# Patient Record
Sex: FEMALE | Race: WHITE | NOT HISPANIC OR LATINO | Employment: OTHER | URBAN - METROPOLITAN AREA
[De-identification: names, ages, dates, MRNs, and addresses within clinical notes are randomized per-mention and may not be internally consistent; named-entity substitution may affect disease eponyms.]

---

## 2017-02-21 ENCOUNTER — PATIENT MESSAGE (OUTPATIENT)
Dept: RESEARCH | Facility: HOSPITAL | Age: 77
End: 2017-02-21

## 2017-03-20 RX ORDER — FENOFIBRATE 134 MG/1
CAPSULE ORAL
Qty: 90 CAPSULE | Refills: 3 | Status: SHIPPED | OUTPATIENT
Start: 2017-03-20 | End: 2018-03-10 | Stop reason: SDUPTHER

## 2017-03-24 ENCOUNTER — TELEPHONE (OUTPATIENT)
Dept: OBSTETRICS AND GYNECOLOGY | Facility: CLINIC | Age: 77
End: 2017-03-24

## 2017-03-24 DIAGNOSIS — Z12.31 VISIT FOR SCREENING MAMMOGRAM: Primary | ICD-10-CM

## 2017-03-24 NOTE — TELEPHONE ENCOUNTER
Spoke with pt. Appt set [place] location as desired 4/6. Pt aware time/location will mail slip.     Pt had an upcoming annual appt with . Pt advised Medicare covers annual exams every 2 years. Asked if she was having any concerns. Pt states she has been having some pressure in the vaginal area and wanted to get that checked especially since she still has all of her female organs. Pt advISED TO KEEP APPT. I WILL UPDATE THE APPT AS A PROBLEM VISIT THAT WAY SHE DOES NOT RECEIVE A BILL. PT VOICED UNDERSTANDING. APPT UPDATED!

## 2017-03-24 NOTE — TELEPHONE ENCOUNTER
----- Message from Olga Parag sent at 3/24/2017 11:13 AM CDT -----  Contact: ROBERTO ASKEW [[id number]]  x_  1st Request  _  2nd Request  _  3rd Request        Who: ROBERTO ASKEW [[id number]]    Why: Pt states she would like to get her [facility] orders in    What Number to Call Back: [phone]    When to Expect a call back: (Before the end of the day)   -- if call after 3:00 call back will be tomorrow.

## 2017-04-06 ENCOUNTER — HOSPITAL ENCOUNTER (OUTPATIENT)
Dept: RADIOLOGY | Facility: HOSPITAL | Age: 77
Discharge: HOME | End: 2017-04-06
Attending: OBSTETRICS & GYNECOLOGY
Payer: MEDICARE

## 2017-04-06 DIAGNOSIS — Z12.31 VISIT FOR SCREENING MAMMOGRAM: ICD-10-CM

## 2017-04-06 PROCEDURE — 77067 SCR MAMMO BI INCL CAD: CPT | Mod: TC

## 2017-04-06 PROCEDURE — 77063 BREAST TOMOSYNTHESIS BI: CPT | Mod: 26,,, | Performed by: RADIOLOGY

## 2017-04-06 PROCEDURE — 77067 SCR MAMMO BI INCL CAD: CPT | Mod: 26,,, | Performed by: RADIOLOGY

## 2017-04-06 RX ORDER — AZITHROMYCIN 500 MG/1
TABLET, FILM COATED ORAL
Qty: 5 TABLET | Refills: 0 | Status: SHIPPED | OUTPATIENT
Start: 2017-04-06 | End: 2017-10-02 | Stop reason: ALTCHOICE

## 2017-04-06 NOTE — TELEPHONE ENCOUNTER
Spoke to pt. Pt stated that she has a dental appointment coming up. She stated that she has a replacement heart valve and was instructed to pre-treat before a dental procedure. She said that she had run out.  Please advise.

## 2017-04-13 ENCOUNTER — OFFICE VISIT (OUTPATIENT)
Dept: OBSTETRICS AND GYNECOLOGY | Facility: CLINIC | Age: 77
End: 2017-04-13
Payer: MEDICARE

## 2017-04-13 VITALS
WEIGHT: 178.81 LBS | DIASTOLIC BLOOD PRESSURE: 60 MMHG | BODY MASS INDEX: 28.74 KG/M2 | HEIGHT: 66 IN | SYSTOLIC BLOOD PRESSURE: 130 MMHG

## 2017-04-13 DIAGNOSIS — R10.2 PELVIC PAIN IN FEMALE: Primary | ICD-10-CM

## 2017-04-13 DIAGNOSIS — N81.10 VAGINAL PROLAPSE: ICD-10-CM

## 2017-04-13 PROCEDURE — 3078F DIAST BP <80 MM HG: CPT | Mod: S$GLB,,, | Performed by: OBSTETRICS & GYNECOLOGY

## 2017-04-13 PROCEDURE — 1160F RVW MEDS BY RX/DR IN RCRD: CPT | Mod: S$GLB,,, | Performed by: OBSTETRICS & GYNECOLOGY

## 2017-04-13 PROCEDURE — 1157F ADVNC CARE PLAN IN RCRD: CPT | Mod: S$GLB,,, | Performed by: OBSTETRICS & GYNECOLOGY

## 2017-04-13 PROCEDURE — 1126F AMNT PAIN NOTED NONE PRSNT: CPT | Mod: S$GLB,,, | Performed by: OBSTETRICS & GYNECOLOGY

## 2017-04-13 PROCEDURE — 99214 OFFICE O/P EST MOD 30 MIN: CPT | Mod: S$GLB,,, | Performed by: OBSTETRICS & GYNECOLOGY

## 2017-04-13 PROCEDURE — 1159F MED LIST DOCD IN RCRD: CPT | Mod: S$GLB,,, | Performed by: OBSTETRICS & GYNECOLOGY

## 2017-04-13 PROCEDURE — 3075F SYST BP GE 130 - 139MM HG: CPT | Mod: S$GLB,,, | Performed by: OBSTETRICS & GYNECOLOGY

## 2017-04-13 PROCEDURE — 99999 PR PBB SHADOW E&M-EST. PATIENT-LVL III: CPT | Mod: PBBFAC,,, | Performed by: OBSTETRICS & GYNECOLOGY

## 2017-04-13 NOTE — MR AVS SNAPSHOT
[place] [facility]   [facility]  [place] [address]  Phone: [phone]                  Zaida Liu   2017 2:00 PM   Office Visit    Description:  Female : 1940   Provider:  Jennifer Means MD   Department:  [place] [facility]           Reason for Visit     Vaginal Pressure                To Do List           Goals (5 Years of Data)     None      [facility]     [facility] Nurse Care Line -  Assistance  Unless otherwise directed by your provider, please contact [facility], our nurse care line that is available for  assistance.     Registered nurses in the [facility] provide: appointment scheduling, clinical advisement, health education, and other advisory services.  Call: [phone] (toll free)               Medications           Message regarding Medications     Verify the changes and/or additions to your medication regime listed below are the same as discussed with your clinician today.  If any of these changes or additions are incorrect, please notify your healthcare provider.             Verify that the below list of medications is an accurate representation of the medications you are currently taking.  If none reported, the list may be blank. If incorrect, please contact your healthcare provider. Carry this list with you in case of emergency.           Current Medications     amlodipine (NORVASC) 10 MG tablet Take 1 tablet (10 mg total) by mouth once daily.    aspirin (ECOTRIN) 81 MG EC tablet Take 81 mg by mouth once daily.    atorvastatin (LIPITOR) 40 MG tablet Take 1 tablet (40 mg total) by mouth nightly.    azithromycin (ZITHROMAX) 500 MG tablet TAKE ONE TABLET BY MOUTH ONCE DAILY    fenofibrate (TRICOR) 145 MG tablet Take 145 mg by mouth once daily.    hydrochlorothiazide (HYDRODIURIL) 25 MG tablet TAKE 1 TABLET EVERY DAY    metformin (GLUCOPHAGE) 500 MG tablet TAKE 1 TABLET ONE TIME DAILY    metoprolol tartrate (LOPRESSOR)  "25 MG tablet Take 1 tablet (25 mg total) by mouth 2 (two) times daily.    omeprazole (PRILOSEC) 20 MG capsule Take 20 mg by mouth every morning.     vitamin D 1000 units Tab Take 1,000 Units by mouth once daily.    fenofibrate micronized (LOFIBRA) 134 MG Cap TAKE 1 CAPSULE EVERY DAY  WITH  BREAKFAST           Clinical Reference Information           Your Vitals Were     BP Height Weight BMI       130/60 5' 6" (1.676 m) 81.1 kg (178 lb 12.7 oz) 28.86 kg/m2       Blood Pressure          Most Recent Value    BP  130/60      Allergies as of 4/13/2017     Codeine      Immunizations Administered on Date of Encounter - 4/13/2017     None      Language Assistance Services     ATTENTION: Language assistance services are available, free of charge. Please call [phone].      ATENCIÓN: Si ravin huang, tiene a gimenez disposición servicios gratuitos de asistencia lingüística. Llame al [phone].     CHÚ Ý: N?u b?n nói Ti?ng Vi?t, có các d?ch v? h? tr? ngôn ng? mi?n phí dành cho b?n. G?i s? [phone].         [facility] complies with applicable Federal civil rights laws and does not discriminate on the basis of race, color, national origin, age, disability, or sex.        "

## 2017-04-13 NOTE — PROGRESS NOTES
CC: vaginal wall bulge  Pelvic pressure    Zaida Liu is a 77 y.o. female  presents for pelvic pressure and a bulge in the vagina.  SHe felt the pain and discomfort two weeks ago but the sx resolved. Has some urinary incontinence with urge sx.         Past Medical History:   Diagnosis Date    Aortic stenosis     moderate to severe    Carotid artery occlusion     Diabetes mellitus     Diabetes mellitus with neuropathy     Diastolic dysfunction     DVT (deep venous thrombosis)     postoperatively    Dyslipidemia     Edema of both legs     Hemorrhoids     History of DVT of lower extremity     s/p surgery    History of hemorrhoids     Hyperlipidemia     Hypertension     OP (osteoporosis)     Positive PPD, treated     PPD positive, treated     hosp x 1 yr, treated x 5 yrs- child    Sciatica     Stenosis     Stenosis of aortic and mitral valves     Thyroid disease     Type 2 diabetes mellitus with circulatory disorder 10/14/2016     Past Surgical History:   Procedure Laterality Date    AORTIC VALVE REPLACEMENT  2015    Aortic valve replacement with a 21-mm [employer] pericardial valve tissue via upper dominguez-sternotomy.    BREAST SURGERY      breast reduction    CHOLECYSTECTOMY      TONSILLECTOMY      TUBAL LIGATION       Social History     Social History    Marital status:      Spouse name: N/A    Number of children: N/A    Years of education: N/A     Social History Main Topics    Smoking status: Former Smoker     Quit date: 10/14/1966    Smokeless tobacco: Never Used    Alcohol use 1.2 oz/week     2 Glasses of wine per week      Comment: 1-2 glasses on wine on weekend    Drug use: No    Sexual activity: No     Other Topics Concern    None     Social History Narrative          Spouse retired, first spouse  when he was 38.          3 children,  2 sons, and daughter,      son with hypertension.  No heart disease as of yet.      Her spouse has hypertension, AFib and  "memory      issues, but apparently since he has retired they are doing better.           FHX:    Bro 82, d. massive MI, + DM,s/p amputation LE limb    Sis , new dx cerebrovascular ds, s/p stent x2 in cerebral carotid                   Family History   Problem Relation Age of Onset    Breast cancer Cousin     Cancer Cousin      breast    Breast cancer Other     Cancer Other      breast    Colon cancer Other     Heart disease Mother      CHF    Heart failure Mother      d. 85    Hypertension Mother     Heart disease Father      d. 61    Alcohol abuse Father     Stroke Father     Hypertension Father     Lymphoma Sister      on immunosuppresant    Rheum arthritis Sister      d.80    Cancer Sister      lymphoma    Heart disease Sister     Stroke Sister      mild    Heart disease Brother      d. 82    Colon cancer Sister      d. 81    Cancer Sister      colon cancer    Heart disease Brother      d. 60    Diabetes Brother     No Known Problems Daughter     No Known Problems Son     Hypertension Son     Heart attack Neg Hx     Hyperlipidemia Neg Hx      OB History      Para Term  AB TAB SAB Ectopic Multiple Living    3 3 3                 /60  Ht 5' 6" (1.676 m)  Wt 81.1 kg (178 lb 12.7 oz)  BMI 28.86 kg/m2      ROS:    ROS:  GENERAL: Denies weight gain or weight loss. Feeling well overall.   SKIN: Denies rash or lesions.   HEAD: Denies head injury or headache.   NODES: Denies enlarged lymph nodes.   CHEST: Denies chest pain or shortness of breath.   CARDIOVASCULAR: Denies palpitations or left sided chest pain.   ABDOMEN: No abdominal pain, constipation, diarrhea, nausea, vomiting or rectal bleeding.   URINARY: No frequency, dysuria, hematuria, or burning on urination.  REPRODUCTIVE: See HPI.   BREASTS: The patient performs breast self-examination and denies pain, lumps, or nipple discharge.   HEMATOLOGIC: No easy bruisability or excessive bleeding.   MUSCULOSKELETAL: Denies " joint pain or swelling.   NEUROLOGIC: Denies syncope or weakness.   PSYCHIATRIC: Denies depression, anxiety or mood swings.    PHYSICAL EXAM:    APPEARANCE: Well nourished, well developed, in no acute distress.  AFFECT: WNL, alert and oriented x 3  SKIN: No acne or hirsutism  NECK: Neck symmetric without masses or thyromegaly  NODES: No inguinal, cervical, axillary, or femoral lymph node enlargement  CHEST: Good respiratory effect  ABDOMEN: Soft.  No tenderness or masses.  No hepatosplenomegaly.  No hernias.  PELVIC: Normal external genitalia without lesions.  Normal hair distribution.  Adequate perineal body, normal urethral meatus.  Vagina moist and well rugated without lesions or discharge.  Cervix pink, prolapses close to introitus with significant pelvic pressure,  without lesions, discharge or tenderness.  Significant cystocele and rectocele.  Bimanual exam shows uterus to be normal size, regular, mobile and nontender.  Adnexa without masses or tenderness.    RECTAL: Rectovaginal exam confirms above with poor sphincter tone, no masses.  EXTREMITIES: No edema.    DIagnosis    ICD-10-CM ICD-9-CM    1. Pelvic pain in female R10.2 625.9 POCT URINE DIPSTICK WITHOUT MICROSCOPE   2. Vaginal prolapse N81.10 618.00          Patient was counseled today on vaginal wall prolapse and with urinary incontinence.  Discussed kegel exercises, vaginal /uterine prolapse  Discussed pessary vs vaginal surgery.  Wants to monitor sx.     F/U PRN or if sx worsen

## 2017-06-14 ENCOUNTER — TELEPHONE (OUTPATIENT)
Dept: INTERNAL MEDICINE | Facility: CLINIC | Age: 77
End: 2017-06-14

## 2017-06-14 DIAGNOSIS — E78.5 HYPERLIPIDEMIA, UNSPECIFIED HYPERLIPIDEMIA TYPE: ICD-10-CM

## 2017-06-14 DIAGNOSIS — E55.9 VITAMIN D DEFICIENCY: Primary | ICD-10-CM

## 2017-06-14 DIAGNOSIS — E11.40 TYPE 2 DIABETES MELLITUS WITH DIABETIC NEUROPATHY, WITHOUT LONG-TERM CURRENT USE OF INSULIN: ICD-10-CM

## 2017-06-14 NOTE — TELEPHONE ENCOUNTER
----- Message from Kirsty Queen sent at 6/14/2017 11:13 AM CDT -----  Doctor appointment and lab have been scheduled.  Please link lab orders to the lab appointment.  Date of doctor appointment:  10-16  Physical or EP:  physical  Date of lab appointment:  10-9  Comments:

## 2017-07-05 RX ORDER — METFORMIN HYDROCHLORIDE 500 MG/1
TABLET ORAL
Qty: 90 TABLET | Refills: 2 | Status: SHIPPED | OUTPATIENT
Start: 2017-07-05 | End: 2018-03-26 | Stop reason: SDUPTHER

## 2017-07-18 ENCOUNTER — LAB VISIT (OUTPATIENT)
Dept: LAB | Facility: HOSPITAL | Age: 77
End: 2017-07-18
Attending: INTERNAL MEDICINE
Payer: MEDICARE

## 2017-07-18 DIAGNOSIS — E11.49 TYPE 2 DIABETES MELLITUS WITH NEUROLOGICAL MANIFESTATIONS, CONTROLLED: ICD-10-CM

## 2017-07-18 DIAGNOSIS — I77.9 BILATERAL CAROTID ARTERY DISEASE: ICD-10-CM

## 2017-07-18 DIAGNOSIS — I35.0 NONRHEUMATIC AORTIC VALVE STENOSIS: ICD-10-CM

## 2017-07-18 DIAGNOSIS — I10 ESSENTIAL HYPERTENSION: ICD-10-CM

## 2017-07-18 DIAGNOSIS — E78.2 MIXED HYPERLIPIDEMIA: Chronic | ICD-10-CM

## 2017-07-18 LAB
ALBUMIN SERPL BCP-MCNC: 4.1 G/DL
ALP SERPL-CCNC: 48 U/L
ALT SERPL W/O P-5'-P-CCNC: 23 U/L
ANION GAP SERPL CALC-SCNC: 13 MMOL/L
AST SERPL-CCNC: 22 U/L
BILIRUB SERPL-MCNC: 0.6 MG/DL
BUN SERPL-MCNC: 30 MG/DL
CALCIUM SERPL-MCNC: 9.8 MG/DL
CHLORIDE SERPL-SCNC: 102 MMOL/L
CHOLEST/HDLC SERPL: 4.7 {RATIO}
CO2 SERPL-SCNC: 25 MMOL/L
CREAT SERPL-MCNC: 1.1 MG/DL
EST. GFR  (AFRICAN AMERICAN): 56.4 ML/MIN/1.73 M^2
EST. GFR  (NON AFRICAN AMERICAN): 48.9 ML/MIN/1.73 M^2
GLUCOSE SERPL-MCNC: 140 MG/DL
HDL/CHOLESTEROL RATIO: 21.3 %
HDLC SERPL-MCNC: 240 MG/DL
HDLC SERPL-MCNC: 51 MG/DL
LDLC SERPL CALC-MCNC: 149.4 MG/DL
NONHDLC SERPL-MCNC: 189 MG/DL
POTASSIUM SERPL-SCNC: 4.2 MMOL/L
PROT SERPL-MCNC: 7.5 G/DL
SODIUM SERPL-SCNC: 140 MMOL/L
TRIGL SERPL-MCNC: 198 MG/DL
TSH SERPL DL<=0.005 MIU/L-ACNC: 3.14 UIU/ML

## 2017-07-18 PROCEDURE — 84443 ASSAY THYROID STIM HORMONE: CPT

## 2017-07-18 PROCEDURE — 80061 LIPID PANEL: CPT

## 2017-07-18 PROCEDURE — 83036 HEMOGLOBIN GLYCOSYLATED A1C: CPT

## 2017-07-18 PROCEDURE — 80053 COMPREHEN METABOLIC PANEL: CPT

## 2017-07-18 PROCEDURE — 36415 COLL VENOUS BLD VENIPUNCTURE: CPT | Mod: PO

## 2017-07-19 ENCOUNTER — TELEPHONE (OUTPATIENT)
Dept: CARDIOLOGY | Facility: CLINIC | Age: 77
End: 2017-07-19

## 2017-07-19 LAB
ESTIMATED AVG GLUCOSE: 146 MG/DL
HBA1C MFR BLD HPLC: 6.7 %

## 2017-07-19 NOTE — TELEPHONE ENCOUNTER
----- Message from Cristela Gray MD sent at 7/19/2017  8:18 AM CDT -----  Please review.  We will discuss the results during your upcoming visit with me. Lipids are not at goal

## 2017-07-25 ENCOUNTER — OFFICE VISIT (OUTPATIENT)
Dept: CARDIOLOGY | Facility: CLINIC | Age: 77
End: 2017-07-25
Payer: MEDICARE

## 2017-07-25 VITALS
HEART RATE: 68 BPM | HEIGHT: 66 IN | WEIGHT: 176.38 LBS | DIASTOLIC BLOOD PRESSURE: 70 MMHG | SYSTOLIC BLOOD PRESSURE: 168 MMHG | BODY MASS INDEX: 28.34 KG/M2

## 2017-07-25 DIAGNOSIS — Z79.4 CONTROLLED TYPE 2 DIABETES MELLITUS WITH OTHER CIRCULATORY COMPLICATION, WITH LONG-TERM CURRENT USE OF INSULIN: ICD-10-CM

## 2017-07-25 DIAGNOSIS — I70.0 AORTIC ATHEROSCLEROSIS: ICD-10-CM

## 2017-07-25 DIAGNOSIS — I35.0 NONRHEUMATIC AORTIC VALVE STENOSIS: ICD-10-CM

## 2017-07-25 DIAGNOSIS — E11.59 CONTROLLED TYPE 2 DIABETES MELLITUS WITH OTHER CIRCULATORY COMPLICATION, WITH LONG-TERM CURRENT USE OF INSULIN: ICD-10-CM

## 2017-07-25 DIAGNOSIS — E78.2 MIXED HYPERLIPIDEMIA: Chronic | ICD-10-CM

## 2017-07-25 DIAGNOSIS — I51.89 LEFT VENTRICULAR DIASTOLIC DYSFUNCTION WITH PRESERVED SYSTOLIC FUNCTION: ICD-10-CM

## 2017-07-25 DIAGNOSIS — I77.9 BILATERAL CAROTID ARTERY DISEASE: ICD-10-CM

## 2017-07-25 DIAGNOSIS — E04.2 NONTOXIC MULTINODULAR GOITER: ICD-10-CM

## 2017-07-25 DIAGNOSIS — I10 ESSENTIAL HYPERTENSION: Primary | ICD-10-CM

## 2017-07-25 DIAGNOSIS — N18.30 CHRONIC KIDNEY DISEASE, STAGE III (MODERATE): ICD-10-CM

## 2017-07-25 PROCEDURE — 99999 PR PBB SHADOW E&M-EST. PATIENT-LVL III: CPT | Mod: PBBFAC,,, | Performed by: INTERNAL MEDICINE

## 2017-07-25 PROCEDURE — 99214 OFFICE O/P EST MOD 30 MIN: CPT | Mod: S$GLB,,, | Performed by: INTERNAL MEDICINE

## 2017-07-25 PROCEDURE — 99499 UNLISTED E&M SERVICE: CPT | Mod: S$GLB,,, | Performed by: INTERNAL MEDICINE

## 2017-07-25 PROCEDURE — 1125F AMNT PAIN NOTED PAIN PRSNT: CPT | Mod: S$GLB,,, | Performed by: INTERNAL MEDICINE

## 2017-07-25 PROCEDURE — 1159F MED LIST DOCD IN RCRD: CPT | Mod: S$GLB,,, | Performed by: INTERNAL MEDICINE

## 2017-07-25 NOTE — PROGRESS NOTES
"Subjective:   Patient ID:  Zaida Liu is a 77 y.o. female who presents for follow-up of Hypertension      HPI: Recent history: taking medications as instructed, no medication side effects noted, no TIA's, no chest pain on exertion, no dyspnea on exertion and no swelling of ankles. Patient's symptoms have been unchanged. No medication side effects.  Symptoms are mostly due to DJD    Lab Results   Component Value Date     07/18/2017    K 4.2 07/18/2017     07/18/2017    CO2 25 07/18/2017    BUN 30 (H) 07/18/2017    CREATININE 1.1 07/18/2017     (H) 07/18/2017    HGBA1C 6.7 (H) 07/18/2017    MG 1.5 (L) 12/15/2015    AST 22 07/18/2017    ALT 23 07/18/2017    ALBUMIN 4.1 07/18/2017    PROT 7.5 07/18/2017    BILITOT 0.6 07/18/2017    WBC 7.12 09/27/2016    HGB 12.6 09/27/2016    HCT 37.3 09/27/2016    HCT 28 (L) 08/25/2015    MCV 86 09/27/2016     09/27/2016    INR 1.0 12/15/2015    TSH 3.136 07/18/2017    CHOL 240 (H) 07/18/2017    HDL 51 07/18/2017    LDLCALC 149.4 07/18/2017    TRIG 198 (H) 07/18/2017       Review of Systems   Constitution: Negative.   HENT: Negative.    Eyes: Negative.    Cardiovascular: Negative.  Negative for chest pain, claudication, dyspnea on exertion, irregular heartbeat, leg swelling, near-syncope, palpitations and syncope.   Respiratory: Negative.  Negative for cough, shortness of breath, snoring and wheezing.    Endocrine: Negative.  Negative for cold intolerance, heat intolerance, polydipsia, polyphagia and polyuria.   Skin: Negative.    Musculoskeletal: Positive for arthritis and back pain.   Gastrointestinal: Negative.    Genitourinary: Positive for bladder incontinence.   Neurological: Negative.    Psychiatric/Behavioral: Negative.        Objective:   Physical Exam   Constitutional: She is oriented to person, place, and time. She appears well-developed and well-nourished.   BP (!) 168/70   Pulse 68   Ht 5' 6" (1.676 m)   Wt 80 kg (176 lb 5.9 oz)   BMI " 28.47 kg/m²      HENT:   Head: Normocephalic.   Eyes: Pupils are equal, round, and reactive to light.   Neck: Normal range of motion. Neck supple. Carotid bruit is not present. No thyromegaly present.   Cardiovascular: Normal rate, regular rhythm and intact distal pulses.  Exam reveals no gallop and no friction rub.    Murmur heard.   Early systolic murmur is present with a grade of 1/6  at the upper right sternal border Sternotomy scar  Pulses:       Carotid pulses are 2+ on the right side, and 2+ on the left side.       Radial pulses are 2+ on the right side, and 2+ on the left side.        Femoral pulses are 2+ on the right side, and 2+ on the left side.       Popliteal pulses are 2+ on the right side, and 2+ on the left side.        Dorsalis pedis pulses are 2+ on the right side, and 2+ on the left side.        Posterior tibial pulses are 2+ on the right side, and 2+ on the left side.   Pulmonary/Chest: Effort normal and breath sounds normal. No respiratory distress. She has no wheezes. She has no rales. She exhibits no tenderness.   Abdominal: Soft. Bowel sounds are normal.   Musculoskeletal: Normal range of motion. She exhibits no edema.   Neurological: She is alert and oriented to person, place, and time.   Skin: Skin is warm and dry.   Psychiatric: She has a normal mood and affect.   Nursing note and vitals reviewed.        Assessment and Plan:     Problem List Items Addressed This Visit        Cardiology Problems    Hyperlipidemia (Chronic)    Relevant Orders    Hemoglobin A1c    TSH    Comprehensive metabolic panel    Lipid panel    Aortic stenosis    Relevant Orders    Hemoglobin A1c    TSH    Comprehensive metabolic panel    Lipid panel    Essential hypertension - Primary    Relevant Orders    Hemoglobin A1c    TSH    Comprehensive metabolic panel    Lipid panel    Left ventricular diastolic dysfunction with preserved systolic function    Relevant Orders    Hemoglobin A1c    TSH    Comprehensive  metabolic panel    Lipid panel    Aortic atherosclerosis    Relevant Orders    Hemoglobin A1c    TSH    Comprehensive metabolic panel    Lipid panel    Bilateral carotid artery disease    Relevant Orders    Hemoglobin A1c    TSH    Comprehensive metabolic panel    Lipid panel    Controlled type 2 diabetes mellitus with circulatory disorder, with long-term current use of insulin    Relevant Orders    Hemoglobin A1c    TSH    Comprehensive metabolic panel    Lipid panel       Other    Nontoxic multinodular goiter    Relevant Orders    Hemoglobin A1c    TSH    Comprehensive metabolic panel    Lipid panel    Chronic kidney disease, stage III (moderate)    Relevant Orders    Hemoglobin A1c    TSH    Comprehensive metabolic panel    Lipid panel      Other Visit Diagnoses    None.         Patient's Medications   New Prescriptions    No medications on file   Previous Medications    AMLODIPINE (NORVASC) 10 MG TABLET    Take 1 tablet (10 mg total) by mouth once daily.    ASPIRIN (ECOTRIN) 81 MG EC TABLET    Take 81 mg by mouth once daily.    ATORVASTATIN (LIPITOR) 40 MG TABLET    Take 1 tablet (40 mg total) by mouth nightly.    AZITHROMYCIN (ZITHROMAX) 500 MG TABLET    TAKE ONE TABLET BY MOUTH ONCE DAILY    FENOFIBRATE MICRONIZED (LOFIBRA) 134 MG CAP    TAKE 1 CAPSULE EVERY DAY  WITH  BREAKFAST    HYDROCHLOROTHIAZIDE (HYDRODIURIL) 25 MG TABLET    TAKE 1 TABLET EVERY DAY    METFORMIN (GLUCOPHAGE) 500 MG TABLET    TAKE 1 TABLET ONE TIME DAILY    METOPROLOL TARTRATE (LOPRESSOR) 25 MG TABLET    Take 1 tablet (25 mg total) by mouth 2 (two) times daily.    OMEPRAZOLE (PRILOSEC) 20 MG CAPSULE    Take 20 mg by mouth every morning.     VITAMIN D 1000 UNITS TAB    Take 1,000 Units by mouth once daily.   Modified Medications    No medications on file   Discontinued Medications    FENOFIBRATE (TRICOR) 145 MG TABLET    Take 145 mg by mouth once daily.   Lipids are not at goal.Marco admits to dietary indiscretions.  Life style  modifications. Low cholesterol diet, exercise and weight loss discussed.    Return in about 6 months (around 1/25/2018).

## 2017-08-08 RX ORDER — AMLODIPINE BESYLATE 10 MG/1
TABLET ORAL
Qty: 90 TABLET | Refills: 3 | Status: SHIPPED | OUTPATIENT
Start: 2017-08-08 | End: 2018-09-11 | Stop reason: SDUPTHER

## 2017-08-22 ENCOUNTER — NURSE TRIAGE (OUTPATIENT)
Dept: ADMINISTRATIVE | Facility: CLINIC | Age: 77
End: 2017-08-22

## 2017-08-22 NOTE — TELEPHONE ENCOUNTER
"    Reason for Disposition   Patient wants to be seen     Stated is not experiencing dizziness at present, but experienced yesterday around lunch time "It has never happened like this before."Unable to find available appointment today.  Advised to go to urgent care of emergency room to be evaluated due to same.  Voiced understanding.    Protocols used: ST DIZZINESS-A-OH      "

## 2017-10-02 ENCOUNTER — HOSPITAL ENCOUNTER (OUTPATIENT)
Dept: RADIOLOGY | Facility: HOSPITAL | Age: 77
Discharge: HOME | End: 2017-10-02
Attending: INTERNAL MEDICINE
Payer: MEDICARE

## 2017-10-02 ENCOUNTER — OFFICE VISIT (OUTPATIENT)
Dept: INTERNAL MEDICINE | Facility: CLINIC | Age: 77
End: 2017-10-02
Payer: MEDICARE

## 2017-10-02 VITALS
HEIGHT: 66 IN | OXYGEN SATURATION: 100 % | SYSTOLIC BLOOD PRESSURE: 144 MMHG | HEART RATE: 65 BPM | DIASTOLIC BLOOD PRESSURE: 74 MMHG | WEIGHT: 176.56 LBS | BODY MASS INDEX: 28.38 KG/M2 | TEMPERATURE: 98 F | RESPIRATION RATE: 16 BRPM

## 2017-10-02 DIAGNOSIS — R42 VERTIGO: ICD-10-CM

## 2017-10-02 DIAGNOSIS — N18.30 CHRONIC KIDNEY DISEASE, STAGE III (MODERATE): ICD-10-CM

## 2017-10-02 DIAGNOSIS — I10 ESSENTIAL HYPERTENSION: ICD-10-CM

## 2017-10-02 DIAGNOSIS — S83.209D: Primary | ICD-10-CM

## 2017-10-02 DIAGNOSIS — E11.59 TYPE 2 DIABETES MELLITUS WITH OTHER CIRCULATORY COMPLICATION, WITHOUT LONG-TERM CURRENT USE OF INSULIN: ICD-10-CM

## 2017-10-02 DIAGNOSIS — R26.9 GAIT DISTURBANCE: ICD-10-CM

## 2017-10-02 DIAGNOSIS — E04.2 NONTOXIC MULTINODULAR GOITER: ICD-10-CM

## 2017-10-02 PROCEDURE — 99214 OFFICE O/P EST MOD 30 MIN: CPT | Mod: S$GLB,,, | Performed by: INTERNAL MEDICINE

## 2017-10-02 PROCEDURE — G0008 ADMIN INFLUENZA VIRUS VAC: HCPCS | Mod: S$GLB,,, | Performed by: INTERNAL MEDICINE

## 2017-10-02 PROCEDURE — 99999 PR PBB SHADOW E&M-EST. PATIENT-LVL IV: CPT | Mod: PBBFAC,,, | Performed by: INTERNAL MEDICINE

## 2017-10-02 PROCEDURE — 99499 UNLISTED E&M SERVICE: CPT | Mod: S$GLB,,, | Performed by: INTERNAL MEDICINE

## 2017-10-02 PROCEDURE — 93010 ELECTROCARDIOGRAM REPORT: CPT | Mod: S$GLB,,, | Performed by: INTERNAL MEDICINE

## 2017-10-02 PROCEDURE — 90662 IIV NO PRSV INCREASED AG IM: CPT | Mod: S$GLB,,, | Performed by: INTERNAL MEDICINE

## 2017-10-02 PROCEDURE — 71020 XR CHEST PA AND LATERAL: CPT | Mod: TC,PO

## 2017-10-02 PROCEDURE — 71020 XR CHEST PA AND LATERAL: CPT | Mod: 26,,, | Performed by: RADIOLOGY

## 2017-10-02 PROCEDURE — 93005 ELECTROCARDIOGRAM TRACING: CPT | Mod: S$GLB,,, | Performed by: INTERNAL MEDICINE

## 2017-10-02 RX ORDER — MELOXICAM 15 MG/1
TABLET ORAL
COMMUNITY
Start: 2017-08-31 | End: 2017-12-11

## 2017-10-02 RX ORDER — TRAMADOL HYDROCHLORIDE 50 MG/1
TABLET ORAL
COMMUNITY
Start: 2017-08-31 | End: 2017-12-11

## 2017-10-02 NOTE — PROGRESS NOTES
77 y.o. female w/ HTN, HLD, CKD III, DMII, and LVDD  presents for preop  Surgery: left knee arthroscopy for torn meniscus  Surgeon: Dr. Carrizales  Date:  10/24/2017    HTN,tx amlodpine 10mg, metoprolol 25mg, and HCTZ    Denied HA or stroke like sx  + dizziness/vertigo, had remotely, then recent exacerbation  BP today==> 144/74    LV DD, denied PND or orthopnea  Echo:2016  CONCLUSIONS     1 - Concentric remodeling.     2 - Normal left ventricular systolic function (EF 60-65%).     3 - Moderate left atrial enlargement.     4 - Left ventricular diastolic dysfunction.     5 - Normal right ventricular systolic function .     6 - The estimated PA systolic pressure is greater than 22 mmHg.     7 - Aortic valve prosthesis, effective prosthetic valve area corrected for BSA is 1.24 cm2.     8 - Trivial tricuspid regurgitation.     CKD III  Denied dysuria or hematuria  EGFR==>48.9    HLD, tx fenofibrate and atorvastatin   carotid AA ds,   aortic atherosclerosis,  Denied chest pain or MARIN,     climbs up/down 2 flights of steps @ home w/o difficulty except for the knee    DMw/ circ manifestations, tx metformin  Denied increased thirst or urination  A1C==>6.7 ( 7/2017)    Multinodular thyroid goiter  Denied cold or heat intolerance  TSH==>3.13 ( 7/2017)    ROS:  No fever, chills , or night sweats  No visual disturbance or d/c  No ear or sinus pressure or pain  No sore throat or dysphagia  No cough or wheezing  No chest pain or palpitations  No GERD or abdominal pain  No diarrhea or blood in stool  No dysuria or hematuria  No cold or heat intolerance  No increased thirst or urination  No HA or focal deficits  No skin rashes or lesions  No unusual bruising or bleeding or clotting; one episode of DVT after surgery many yrs ago  + vertigo w/ quick head movements, occ balance off; + tinnitus since age 41  No problem w/ anesthesia in the past  Remainder of review negative except as previously noted    PMHX:  Reviewed  PSHX:Reviewed  SHX:Reviewed  FHX: Reviewed    PE:  VSS:  GEN:WDWN, A&O, NAD, conversant and co-operative  EYES: Conj/lids unremarkable, sclera anicteric, pupils reactive  ENT: Canals free of cerumen, TM's unremarkable; nasal mucosa/turbinates w/o exudate or edema; o/p w/o exudate or erythema; sinus nontender  NECK: Supple w/o lymphadenopathy or thyromegaly  RESPIRATORY: Efforts unlabored; lungs CTA  CARDIOVASCULAR: Heart RRR, no carotid bruits noted, 1+ pedal pulses, no edema  GASTROINTESTINAL: BS+, soft, NT/ND, - HSM noted  MUSCULOSKELETAL: Gait normal, no CCE  NEUROLOGIC:VIEIRA. No tremor noted  SKIN: Warm and dry    IMPRESSION:  Knee meniscal tear-   DM w/ circ, stable  HTN, slight elevation  CKD, III, asx  LV DD, asx  Vertigo/tinnitus/dizziness, exacerbated  Gait disturbance  Hx DVT s/p surgery      PLAN:  EKG  CXR  Lab  Consult Neurology- pt to call Dr. Nixon- her spouse's [facility]    Pt optimization completed ,    No contraindication to anesthesia and surgery noted @ this time    Review of lab, CXR ( need f/up CT), and EKG

## 2017-10-03 ENCOUNTER — TELEPHONE (OUTPATIENT)
Dept: INTERNAL MEDICINE | Facility: CLINIC | Age: 77
End: 2017-10-03

## 2017-10-03 NOTE — TELEPHONE ENCOUNTER
"Lois Mays - AUTHORIZATION << Less Detail',event)" href="javascript:;">More Detail >>      AUTHORIZATION   Lois Mays   Sent: Tue October 03, 2017 12:23 PM   To: NANCY Larios Staff                  Message     Patient's referral to Dr Bill Nixon has been authorized by [facility]. Referral #[id number]; 6 visits; visit date range 10/2/2017 to 3/31/2018        "

## 2017-10-06 ENCOUNTER — TELEPHONE (OUTPATIENT)
Dept: INTERNAL MEDICINE | Facility: CLINIC | Age: 77
End: 2017-10-06

## 2017-10-06 DIAGNOSIS — R91.8 MULTIPLE LUNG NODULES ON CT: Primary | ICD-10-CM

## 2017-10-06 NOTE — TELEPHONE ENCOUNTER
----- Message from Karina Wilson MD sent at 10/6/2017  1:16 PM CDT -----  Please note that your lab was stable  Your chest xray did not reveal any infection , there is an ill-defined opacity at the left apex  Note it is stable when compared to prior studies but recommended to follow with a CT   ( it can be done either before of after your surgery, please advise of your preference)  Your EKG did not reveal any sign of ischemia.  Karina Cabrales

## 2017-10-06 NOTE — TELEPHONE ENCOUNTER
Do you have Dr. Carrizales's contact information to fax reports? Should have been with pt's red folder.

## 2017-10-06 NOTE — TELEPHONE ENCOUNTER
[facility]    Please note that your lab was stable  Your chest xray did not reveal any infection , there is an ill-defined opacity at the left apex  Note it is stable when compared to prior studies but recommended to follow with a CT   ( it can be done either before of after your surgery, please advise of your preference)  Your EKG did not reveal any sign of ischemia.      Will place an order for chest CT  And await pt decision as to when she wants to set up  SHe was advised by Dr. Quiñones 1/2016 to have f/up Chest CT 6 mos    Note will reflect pt optimization completed   Please send copies of EKG, CXR and lab and note to her surgeon

## 2017-10-11 RX ORDER — ATORVASTATIN CALCIUM 40 MG/1
TABLET, FILM COATED ORAL
Qty: 90 TABLET | Refills: 3 | Status: SHIPPED | OUTPATIENT
Start: 2017-10-11 | End: 2018-10-16 | Stop reason: SDUPTHER

## 2017-10-12 DIAGNOSIS — R42 DIZZINESS AND GIDDINESS: Primary | ICD-10-CM

## 2017-10-16 ENCOUNTER — HOSPITAL ENCOUNTER (OUTPATIENT)
Dept: RADIOLOGY | Facility: HOSPITAL | Age: 77
Discharge: HOME | End: 2017-10-16
Attending: PSYCHIATRY & NEUROLOGY
Payer: MEDICARE

## 2017-10-16 DIAGNOSIS — R42 DIZZINESS AND GIDDINESS: ICD-10-CM

## 2017-10-16 PROCEDURE — 70551 MRI BRAIN STEM W/O DYE: CPT | Mod: 26,,, | Performed by: RADIOLOGY

## 2017-10-16 PROCEDURE — 70551 MRI BRAIN STEM W/O DYE: CPT | Mod: TC

## 2017-11-01 ENCOUNTER — OFFICE VISIT (OUTPATIENT)
Dept: ENDOCRINOLOGY | Facility: CLINIC | Age: 77
End: 2017-11-01
Payer: MEDICARE

## 2017-11-01 VITALS
HEART RATE: 70 BPM | SYSTOLIC BLOOD PRESSURE: 142 MMHG | BODY MASS INDEX: 29.09 KG/M2 | HEIGHT: 66 IN | DIASTOLIC BLOOD PRESSURE: 72 MMHG | WEIGHT: 181 LBS

## 2017-11-01 DIAGNOSIS — E04.2 MULTIPLE THYROID NODULES: Primary | ICD-10-CM

## 2017-11-01 PROCEDURE — 99499 UNLISTED E&M SERVICE: CPT | Mod: S$GLB,,, | Performed by: INTERNAL MEDICINE

## 2017-11-01 PROCEDURE — 99999 PR PBB SHADOW E&M-EST. PATIENT-LVL III: CPT | Mod: PBBFAC,,, | Performed by: INTERNAL MEDICINE

## 2017-11-01 PROCEDURE — 99213 OFFICE O/P EST LOW 20 MIN: CPT | Mod: S$GLB,,, | Performed by: INTERNAL MEDICINE

## 2017-11-01 NOTE — PROGRESS NOTES
"Ms. Liu is a 77 y.o. F with history of thyroid nodules, Dm2, DVT, Hl, AS s/p surgery,  here for followup.    Pt is new to me.  Last visit w Dr. Padgett in late 2016.    MNG found on palpation in 2011. At that time US showed an isthmic nodule 3.5cm which was biopsied in 2012 and showed "atypical cellular lesion."  This isthmic nodule has been subsequently biopsied as normal twice, last in Oct 2016. She also had a R nodule that was biopsied as benign in June 2015.     Her maternal grandmother had a goiter and the thyroid was surgically removed.  No hx of radiation to neck or family hx of thyroid cancer.     Pt feels dominant thyroid nodule not causing swallowing, breathing, hoarseness issues. Does not feel nodule changed in size. TFTs wnl in Oct 2017.    A1c 6.7, on metformin 500mg daily    Component      Latest Ref Rng & Units 10/16/2017   T4 Total      4.5 - 11.5 ug/dL 7.5   TSH      0.400 - 4.000 uIU/mL 1.479     Component      Latest Ref Rng & Units 10/2/2017 7/18/2017   eGFR if African American      >60 mL/min/1.73 m:2  56.4 (A)   eGFR if non African American      >60 mL/min/1.73 m:2  48.9 (A)   Cholesterol      120 - 199 mg/dL  240 (H)   Triglycerides      30 - 150 mg/dL  198 (H)   HDL      40 - 75 mg/dL  51   LDL Cholesterol      63.0 - 159.0 mg/dL  149.4   HDL/Chol Ratio      20.0 - 50.0 %  21.3   Total Cholesterol/HDL Ratio      2.0 - 5.0  4.7   Non-HDL Cholesterol      mg/dL  189   Microalbum.,U,Random      ug/mL 38.0    Creatinine, Random Ur      15.0 - 325.0 mg/dL 152.0    Microalb Creat Ratio      0.0 - 30.0 ug/mg 25.0    Hemoglobin A1C      4.0 - 5.6 %  6.7 (H)   Estimated Avg Glucose      68 - 131 mg/dL  146 (H)     Past Medical History:   Diagnosis Date    Aortic stenosis     moderate to severe    Carotid artery occlusion     Diabetes mellitus     Diabetes mellitus with neuropathy     Diastolic dysfunction     DVT (deep venous thrombosis)     postoperatively    Dyslipidemia     Edema of both " legs     Hemorrhoids     History of DVT of lower extremity     s/p surgery    History of hemorrhoids     Hyperlipidemia     Hypertension     OP (osteoporosis)     Positive PPD, treated     PPD positive, treated     hosp x 1 yr, treated x 5 yrs- child    Sciatica     Stenosis     Stenosis of aortic and mitral valves     Thyroid disease     Type 2 diabetes mellitus with circulatory disorder 10/14/2016        reports that she quit smoking about 51 years ago. She has never used smokeless tobacco. She reports that she drinks about 1.2 oz of alcohol per week . She reports that she does not use drugs.    Family History   Problem Relation Age of Onset    Breast cancer Cousin     Cancer Cousin      breast    Breast cancer Other     Cancer Other      breast    Colon cancer Other     Heart disease Mother      CHF    Heart failure Mother      d. 85    Hypertension Mother     Heart disease Father      d. 61    Alcohol abuse Father     Stroke Father     Hypertension Father     Lymphoma Sister      on immunosuppresant    Rheum arthritis Sister      d.80    Cancer Sister      lymphoma    Heart disease Sister     Stroke Sister      mild    Heart disease Brother      d. 82    Colon cancer Sister      d. 81    Cancer Sister      colon cancer    Heart disease Brother      d. 60    Diabetes Brother     No Known Problems Daughter     No Known Problems Son     Hypertension Son     Heart attack Neg Hx     Hyperlipidemia Neg Hx        Past Surgical History:   Procedure Laterality Date    AORTIC VALVE REPLACEMENT  8/25/2015    Aortic valve replacement with a 21-mm [employer] pericardial valve tissue via upper dominguez-sternotomy.    BREAST SURGERY      breast reduction    CHOLECYSTECTOMY      TONSILLECTOMY      TUBAL LIGATION         Patient's Medications   New Prescriptions    No medications on file   Previous Medications    AMLODIPINE (NORVASC) 10 MG TABLET    TAKE 1 TABLET ONE TIME DAILY    ASPIRIN  "(ECOTRIN) 81 MG EC TABLET    Take 81 mg by mouth once daily.    ATORVASTATIN (LIPITOR) 40 MG TABLET    TAKE 1 TABLET  NIGHTLY    FENOFIBRATE MICRONIZED (LOFIBRA) 134 MG CAP    TAKE 1 CAPSULE EVERY DAY  WITH  BREAKFAST    HYDROCHLOROTHIAZIDE (HYDRODIURIL) 25 MG TABLET    TAKE 1 TABLET EVERY DAY    MELOXICAM (MOBIC) 15 MG TABLET        METFORMIN (GLUCOPHAGE) 500 MG TABLET    TAKE 1 TABLET ONE TIME DAILY    METOPROLOL TARTRATE (LOPRESSOR) 25 MG TABLET    Take 1 tablet (25 mg total) by mouth 2 (two) times daily.    OMEPRAZOLE (PRILOSEC) 20 MG CAPSULE    Take 20 mg by mouth every morning.     TRAMADOL (ULTRAM) 50 MG TABLET        VITAMIN D 1000 UNITS TAB    Take 1,000 Units by mouth once daily.   Modified Medications    No medications on file   Discontinued Medications    No medications on file         Review of Systems:  General: no fevers  Eyes: no vision changes  ENT: no sore throat  Lung: no sob  CV: no palpitations  GI: no nausea   : no dysuria   Endo: no heat interolance  Heme: no easy bruising   MSK: no joint swelling        BP (!) 142/72   Pulse 70   Ht 5' 6" (1.676 m)   Wt 82.1 kg (181 lb)   BMI 29.21 kg/m²   Physical Exam:  General: obese body habitus, not in acute distress   Eyes: anicteric, no proptosis   ENT: no facial lesions, no oral lesions   Neck: thyroid isthmus 4cm nodule palpated, no cervical LAD  Lung; ctabl, no wheezing or stridor   GI: normal bowel sounds, nondistended   CV: RRR, no rubs or murmurs   Skin: exposed skin without bruising or bleeding   Ext: no peripheral edema or erythema  Neuro: AOx3, moving all extremities, normal gait     Labs:    Chemistry        Component Value Date/Time     10/02/2017 1240    K 3.8 10/02/2017 1240     10/02/2017 1240    CO2 28 10/02/2017 1240    BUN 20 10/02/2017 1240    CREATININE 1.1 10/02/2017 1240     (H) 10/02/2017 1240        Component Value Date/Time    CALCIUM 9.9 10/02/2017 1240    ALKPHOS 55 10/02/2017 1240    AST 28 10/02/2017 " 1240    ALT 40 10/02/2017 1240    BILITOT 0.8 10/02/2017 1240    ESTGFRAFRICA 56.4 (A) 10/02/2017 1240    EGFRNONAA 48.9 (A) 10/02/2017 1240          Lab Results   Component Value Date    WBC 7.64 10/02/2017    HGB 13.8 10/02/2017    HCT 39.5 10/02/2017    MCV 84 10/02/2017     10/02/2017        Lab Results   Component Value Date    HDL 51 07/18/2017    HDL 43 11/21/2016    HDL 50 09/27/2016     Lab Results   Component Value Date    LDLCALC 149.4 07/18/2017    LDLCALC 139.0 11/21/2016    LDLCALC 138.0 09/27/2016     Lab Results   Component Value Date    TRIG 198 (H) 07/18/2017    TRIG 200 (H) 11/21/2016    TRIG 160 (H) 09/27/2016     Lab Results   Component Value Date    CHOLHDL 21.3 07/18/2017    CHOLHDL 19.4 (L) 11/21/2016    CHOLHDL 22.7 09/27/2016       Hemoglobin A1C   Date Value Ref Range Status   07/18/2017 6.7 (H) 4.0 - 5.6 % Final     Comment:     According to ADA guidelines, hemoglobin A1c <7.0% represents  optimal control in non-pregnant diabetic patients. Different  metrics may apply to specific patient populations.   Standards of Medical Care in Diabetes-2016.  For the purpose of screening for the presence of diabetes:  <5.7%     Consistent with the absence of diabetes  5.7-6.4%  Consistent with increasing risk for diabetes   (prediabetes)  >or=6.5%  Consistent with diabetes  Currently, no consensus exists for use of hemoglobin A1c  for diagnosis of diabetes for children.  This Hemoglobin A1c assay has significant interference with fetal   hemoglobin   (HbF). The results are invalid for patients with abnormal amounts of   HbF,   including those with known Hereditary Persistence   of Fetal Hemoglobin. Heterozygous hemoglobin variants (HbAS, HbAC,   HbAD, HbAE, HbA2) do not significantly interfere with this assay;   however, presence of multiple variants in a sample may impact the %   interference.     11/21/2016 6.8 (H) 4.5 - 6.2 % Final     Comment:     According to ADA guidelines, hemoglobin A1C  <7.0% represents  optimal control in non-pregnant diabetic patients.  Different  metrics may apply to specific populations.   Standards of Medical Care in Diabetes - 2016.  For the purpose of screening for the presence of diabetes:  <5.7%     Consistent with the absence of diabetes  5.7-6.4%  Consistent with increasing risk for diabetes   (prediabetes)  >or=6.5%  Consistent with diabetes  Currently no consensus exists for use of hemoglobin A1C  for diagnosis of diabetes for children.     09/27/2016 6.7 (H) 4.5 - 6.2 % Final     Comment:     According to ADA guidelines, hemoglobin A1C <7.0% represents  optimal control in non-pregnant diabetic patients.  Different  metrics may apply to specific populations.   Standards of Medical Care in Diabetes - 2016.  For the purpose of screening for the presence of diabetes:  <5.7%     Consistent with the absence of diabetes  5.7-6.4%  Consistent with increasing risk for diabetes   (prediabetes)  >or=6.5%  Consistent with diabetes  Currently no consensus exists for use of hemoglobin A1C  for diagnosis of diabetes for children.         Lab Results   Component Value Date    TSH 1.479 10/16/2017    I5PPWTM 7.5 10/16/2017       Assessment and Plan:  Ms. Liu with history of thyroid nodules, Dm2, DVT, Hl, AS s/p surgery, here for followup.    Thryoid US repeat   Discussed with patient the patholophysiology, implications of thryoid nodules  Pt prefers conservative treatment and does not want surgery    RTC 12 months    Doc Cruz M.D.  Endocrinology

## 2017-11-08 DIAGNOSIS — I51.89 LEFT VENTRICULAR DIASTOLIC DYSFUNCTION WITH PRESERVED SYSTOLIC FUNCTION: ICD-10-CM

## 2017-11-08 DIAGNOSIS — I10 ESSENTIAL HYPERTENSION: ICD-10-CM

## 2017-11-08 DIAGNOSIS — Z95.2 S/P AVR (AORTIC VALVE REPLACEMENT): ICD-10-CM

## 2017-11-08 DIAGNOSIS — I35.0 NONRHEUMATIC AORTIC VALVE STENOSIS: ICD-10-CM

## 2017-11-08 RX ORDER — METOPROLOL TARTRATE 25 MG/1
TABLET, FILM COATED ORAL
Qty: 180 TABLET | Refills: 3 | Status: SHIPPED | OUTPATIENT
Start: 2017-11-08 | End: 2018-12-31 | Stop reason: SDUPTHER

## 2017-11-09 RX ORDER — HYDROCHLOROTHIAZIDE 25 MG/1
TABLET ORAL
Qty: 90 TABLET | Refills: 3 | Status: SHIPPED | OUTPATIENT
Start: 2017-11-09 | End: 2018-06-14 | Stop reason: DRUGHIGH

## 2017-11-28 ENCOUNTER — HOSPITAL ENCOUNTER (OUTPATIENT)
Dept: ENDOCRINOLOGY | Facility: CLINIC | Age: 77
Discharge: HOME | End: 2017-11-28
Attending: INTERNAL MEDICINE
Payer: MEDICARE

## 2017-11-28 ENCOUNTER — TELEPHONE (OUTPATIENT)
Dept: ENDOCRINOLOGY | Facility: CLINIC | Age: 77
End: 2017-11-28

## 2017-11-28 DIAGNOSIS — E04.2 MULTIPLE THYROID NODULES: ICD-10-CM

## 2017-11-28 PROCEDURE — 76536 US EXAM OF HEAD AND NECK: CPT | Mod: S$GLB,,, | Performed by: INTERNAL MEDICINE

## 2017-11-28 NOTE — TELEPHONE ENCOUNTER
Reviewed with patient - Nov 2017 US isthmus nodule stable with previous (about 5cm in largest dimension) - given previous 2 normal biopsies can monitor for now - difficult to rule out malignancy entirely but risk is less given stable size and previous normal biopsy.  Pt is agreeable.  Recommend repeat US in 1 year, sooner if pt has symptoms.     Doc Cruz MD

## 2017-12-09 ENCOUNTER — LAB VISIT (OUTPATIENT)
Dept: LAB | Facility: HOSPITAL | Age: 77
End: 2017-12-09
Attending: INTERNAL MEDICINE
Payer: MEDICARE

## 2017-12-09 DIAGNOSIS — E04.2 NONTOXIC MULTINODULAR GOITER: ICD-10-CM

## 2017-12-09 DIAGNOSIS — N18.30 CHRONIC KIDNEY DISEASE, STAGE III (MODERATE): ICD-10-CM

## 2017-12-09 DIAGNOSIS — E78.2 MIXED HYPERLIPIDEMIA: Chronic | ICD-10-CM

## 2017-12-09 DIAGNOSIS — I77.9 BILATERAL CAROTID ARTERY DISEASE: ICD-10-CM

## 2017-12-09 DIAGNOSIS — I10 ESSENTIAL HYPERTENSION: ICD-10-CM

## 2017-12-09 DIAGNOSIS — I35.0 NONRHEUMATIC AORTIC VALVE STENOSIS: ICD-10-CM

## 2017-12-09 DIAGNOSIS — I51.89 LEFT VENTRICULAR DIASTOLIC DYSFUNCTION WITH PRESERVED SYSTOLIC FUNCTION: ICD-10-CM

## 2017-12-09 DIAGNOSIS — Z79.4 CONTROLLED TYPE 2 DIABETES MELLITUS WITH OTHER CIRCULATORY COMPLICATION, WITH LONG-TERM CURRENT USE OF INSULIN: ICD-10-CM

## 2017-12-09 DIAGNOSIS — I70.0 AORTIC ATHEROSCLEROSIS: ICD-10-CM

## 2017-12-09 DIAGNOSIS — E11.59 CONTROLLED TYPE 2 DIABETES MELLITUS WITH OTHER CIRCULATORY COMPLICATION, WITH LONG-TERM CURRENT USE OF INSULIN: ICD-10-CM

## 2017-12-09 LAB
ALBUMIN SERPL BCP-MCNC: 4.2 G/DL
ALP SERPL-CCNC: 71 U/L
ALT SERPL W/O P-5'-P-CCNC: 43 U/L
ANION GAP SERPL CALC-SCNC: 11 MMOL/L
AST SERPL-CCNC: 28 U/L
BILIRUB SERPL-MCNC: 0.8 MG/DL
BUN SERPL-MCNC: 19 MG/DL
CALCIUM SERPL-MCNC: 9.8 MG/DL
CHLORIDE SERPL-SCNC: 100 MMOL/L
CHOLEST SERPL-MCNC: 235 MG/DL
CHOLEST/HDLC SERPL: 4.5 {RATIO}
CO2 SERPL-SCNC: 28 MMOL/L
CREAT SERPL-MCNC: 1.2 MG/DL
EST. GFR  (AFRICAN AMERICAN): 50.7 ML/MIN/1.73 M^2
EST. GFR  (NON AFRICAN AMERICAN): 44 ML/MIN/1.73 M^2
ESTIMATED AVG GLUCOSE: 151 MG/DL
GLUCOSE SERPL-MCNC: 163 MG/DL
HBA1C MFR BLD HPLC: 6.9 %
HDLC SERPL-MCNC: 52 MG/DL
HDLC SERPL: 22.1 %
LDLC SERPL CALC-MCNC: 147.4 MG/DL
NONHDLC SERPL-MCNC: 183 MG/DL
POTASSIUM SERPL-SCNC: 3.9 MMOL/L
PROT SERPL-MCNC: 8 G/DL
SODIUM SERPL-SCNC: 139 MMOL/L
TRIGL SERPL-MCNC: 178 MG/DL
TSH SERPL DL<=0.005 MIU/L-ACNC: 3.79 UIU/ML

## 2017-12-09 PROCEDURE — 83036 HEMOGLOBIN GLYCOSYLATED A1C: CPT

## 2017-12-09 PROCEDURE — 84443 ASSAY THYROID STIM HORMONE: CPT

## 2017-12-09 PROCEDURE — 36415 COLL VENOUS BLD VENIPUNCTURE: CPT | Mod: PO

## 2017-12-09 PROCEDURE — 80053 COMPREHEN METABOLIC PANEL: CPT

## 2017-12-09 PROCEDURE — 80061 LIPID PANEL: CPT

## 2017-12-11 ENCOUNTER — OFFICE VISIT (OUTPATIENT)
Dept: CARDIOLOGY | Facility: CLINIC | Age: 77
End: 2017-12-11
Payer: MEDICARE

## 2017-12-11 ENCOUNTER — TELEPHONE (OUTPATIENT)
Dept: CARDIOLOGY | Facility: CLINIC | Age: 77
End: 2017-12-11

## 2017-12-11 VITALS
BODY MASS INDEX: 29.12 KG/M2 | WEIGHT: 181.19 LBS | HEART RATE: 72 BPM | SYSTOLIC BLOOD PRESSURE: 146 MMHG | DIASTOLIC BLOOD PRESSURE: 68 MMHG | HEIGHT: 66 IN

## 2017-12-11 DIAGNOSIS — I77.9 BILATERAL CAROTID ARTERY DISEASE: ICD-10-CM

## 2017-12-11 DIAGNOSIS — E04.2 NONTOXIC MULTINODULAR GOITER: ICD-10-CM

## 2017-12-11 DIAGNOSIS — E11.59 CONTROLLED TYPE 2 DIABETES MELLITUS WITH OTHER CIRCULATORY COMPLICATION, WITH LONG-TERM CURRENT USE OF INSULIN: ICD-10-CM

## 2017-12-11 DIAGNOSIS — I70.0 AORTIC ATHEROSCLEROSIS: ICD-10-CM

## 2017-12-11 DIAGNOSIS — Z95.2 S/P AVR (AORTIC VALVE REPLACEMENT): ICD-10-CM

## 2017-12-11 DIAGNOSIS — E78.2 MIXED HYPERLIPIDEMIA: Chronic | ICD-10-CM

## 2017-12-11 DIAGNOSIS — Z79.4 CONTROLLED TYPE 2 DIABETES MELLITUS WITH OTHER CIRCULATORY COMPLICATION, WITH LONG-TERM CURRENT USE OF INSULIN: ICD-10-CM

## 2017-12-11 DIAGNOSIS — N18.30 CHRONIC KIDNEY DISEASE, STAGE III (MODERATE): ICD-10-CM

## 2017-12-11 DIAGNOSIS — I35.0 NONRHEUMATIC AORTIC VALVE STENOSIS: ICD-10-CM

## 2017-12-11 DIAGNOSIS — I10 ESSENTIAL HYPERTENSION: Primary | ICD-10-CM

## 2017-12-11 DIAGNOSIS — I51.89 LEFT VENTRICULAR DIASTOLIC DYSFUNCTION WITH PRESERVED SYSTOLIC FUNCTION: ICD-10-CM

## 2017-12-11 PROCEDURE — 99214 OFFICE O/P EST MOD 30 MIN: CPT | Mod: S$GLB,,, | Performed by: INTERNAL MEDICINE

## 2017-12-11 PROCEDURE — 99999 PR PBB SHADOW E&M-EST. PATIENT-LVL III: CPT | Mod: PBBFAC,,, | Performed by: INTERNAL MEDICINE

## 2017-12-11 PROCEDURE — 99499 UNLISTED E&M SERVICE: CPT | Mod: S$GLB,,, | Performed by: INTERNAL MEDICINE

## 2017-12-11 NOTE — PROGRESS NOTES
"Subjective:   Patient ID:  Zaida Liu is a 77 y.o. female who presents for follow-up of Hypertension      HPI: Doing well. No CV symptoms. Lipids slightly abnormal. Patient adits to dietary indiscretions.    Lab Results   Component Value Date     12/09/2017    K 3.9 12/09/2017     12/09/2017    CO2 28 12/09/2017    BUN 19 12/09/2017    CREATININE 1.2 12/09/2017     (H) 12/09/2017    HGBA1C 6.9 (H) 12/09/2017    MG 1.5 (L) 12/15/2015    AST 28 12/09/2017    ALT 43 12/09/2017    ALBUMIN 4.2 12/09/2017    PROT 8.0 12/09/2017    BILITOT 0.8 12/09/2017    WBC 7.64 10/02/2017    HGB 13.8 10/02/2017    HCT 39.5 10/02/2017    HCT 28 (L) 08/25/2015    MCV 84 10/02/2017     10/02/2017    INR 1.0 12/15/2015    TSH 3.794 12/09/2017    CHOL 235 (H) 12/09/2017    HDL 52 12/09/2017    LDLCALC 147.4 12/09/2017    TRIG 178 (H) 12/09/2017       Review of Systems   Constitution: Negative.   HENT: Negative.    Eyes: Negative.    Cardiovascular: Negative.  Negative for chest pain, claudication, dyspnea on exertion, irregular heartbeat, leg swelling, near-syncope, palpitations and syncope.   Respiratory: Negative.  Negative for cough, shortness of breath, snoring and wheezing.    Endocrine: Negative.  Negative for cold intolerance, heat intolerance, polydipsia, polyphagia and polyuria.   Skin: Negative.    Musculoskeletal: Positive for arthritis and muscle cramps.   Gastrointestinal: Negative.    Genitourinary: Positive for bladder incontinence.   Neurological: Negative.    Psychiatric/Behavioral: Negative.        Objective:   Physical Exam   Constitutional: She is oriented to person, place, and time. She appears well-developed and well-nourished.   BP (!) 146/68   Pulse 72   Ht 5' 6" (1.676 m)   Wt 82.2 kg (181 lb 3.5 oz)   BMI 29.25 kg/m²      HENT:   Head: Normocephalic.   Eyes: Pupils are equal, round, and reactive to light.   Neck: Normal range of motion. Neck supple. No thyromegaly present. "   Cardiovascular: Normal rate, regular rhythm and intact distal pulses.  Exam reveals no gallop and no friction rub.    Murmur heard.   Early systolic murmur is present with a grade of 1/6  at the upper right sternal border  Pulses:       Carotid pulses are 2+ on the right side with bruit, and 2+ on the left side with bruit.       Radial pulses are 2+ on the right side, and 2+ on the left side.        Femoral pulses are 2+ on the right side, and 2+ on the left side.       Popliteal pulses are 2+ on the right side, and 2+ on the left side.        Dorsalis pedis pulses are 2+ on the right side, and 2+ on the left side.        Posterior tibial pulses are 2+ on the right side, and 2+ on the left side.   Pulmonary/Chest: Effort normal and breath sounds normal. No respiratory distress. She has no wheezes. She has no rales. She exhibits no tenderness.   Abdominal: Soft. Bowel sounds are normal.   Musculoskeletal: Normal range of motion. She exhibits no edema.   Neurological: She is alert and oriented to person, place, and time.   Skin: Skin is warm and dry.   Psychiatric: She has a normal mood and affect.   Nursing note and vitals reviewed.        Assessment and Plan:     Problem List Items Addressed This Visit        Cardiology Problems    Hyperlipidemia (Chronic)    Relevant Orders    CAR Ultrasound doppler carotid bliateral    Comprehensive metabolic panel    Lipid panel    TSH    Hemoglobin A1c    Aortic stenosis    Relevant Orders    CAR Ultrasound doppler carotid bliateral    Comprehensive metabolic panel    Lipid panel    TSH    Hemoglobin A1c    Essential hypertension - Primary    Relevant Orders    CAR Ultrasound doppler carotid bliateral    Comprehensive metabolic panel    Lipid panel    TSH    Hemoglobin A1c    Left ventricular diastolic dysfunction with preserved systolic function    Relevant Orders    CAR Ultrasound doppler carotid bliateral    Comprehensive metabolic panel    Lipid panel    TSH    Hemoglobin  A1c    Aortic atherosclerosis    Relevant Orders    CAR Ultrasound doppler carotid bliateral    Comprehensive metabolic panel    Lipid panel    TSH    Hemoglobin A1c    Bilateral carotid artery disease    Relevant Orders    CAR Ultrasound doppler carotid bliateral    Comprehensive metabolic panel    Lipid panel    TSH    Hemoglobin A1c    Controlled type 2 diabetes mellitus with circulatory disorder, with long-term current use of insulin    Relevant Orders    CAR Ultrasound doppler carotid bliateral    Comprehensive metabolic panel    Lipid panel    TSH    Hemoglobin A1c       Other    Nontoxic multinodular goiter    Relevant Orders    CAR Ultrasound doppler carotid bliateral    Comprehensive metabolic panel    Lipid panel    TSH    Hemoglobin A1c    S/P AVR (aortic valve replacement)    Relevant Orders    CAR Ultrasound doppler carotid bliateral    Comprehensive metabolic panel    Lipid panel    TSH    Hemoglobin A1c    Chronic kidney disease, stage III (moderate)    Relevant Orders    CAR Ultrasound doppler carotid bliateral    Comprehensive metabolic panel    Lipid panel    TSH    Hemoglobin A1c          Patient's Medications   New Prescriptions    No medications on file   Previous Medications    AMLODIPINE (NORVASC) 10 MG TABLET    TAKE 1 TABLET ONE TIME DAILY    ASPIRIN (ECOTRIN) 81 MG EC TABLET    Take 81 mg by mouth once daily.    ATORVASTATIN (LIPITOR) 40 MG TABLET    TAKE 1 TABLET  NIGHTLY    FENOFIBRATE MICRONIZED (LOFIBRA) 134 MG CAP    TAKE 1 CAPSULE EVERY DAY  WITH  BREAKFAST    HYDROCHLOROTHIAZIDE (HYDRODIURIL) 25 MG TABLET    TAKE 1 TABLET EVERY DAY    METFORMIN (GLUCOPHAGE) 500 MG TABLET    TAKE 1 TABLET ONE TIME DAILY    METOPROLOL TARTRATE (LOPRESSOR) 25 MG TABLET    TAKE 1 TABLET TWICE DAILY    OMEPRAZOLE (PRILOSEC) 20 MG CAPSULE    Take 20 mg by mouth every morning.     VITAMIN D 1000 UNITS TAB    Take 1,000 Units by mouth once daily.   Modified Medications    No medications on file    Discontinued Medications    MELOXICAM (MOBIC) 15 MG TABLET        TRAMADOL (ULTRAM) 50 MG TABLET         Continue on the present regimen.  Compliance reinforced  Return in about 6 months (around 6/11/2018).

## 2017-12-11 NOTE — TELEPHONE ENCOUNTER
----- Message from Cristela Gray MD sent at 12/11/2017  8:22 AM CST -----  Please review.  We will discuss the results during your today visit with me. It looks good.

## 2018-03-13 RX ORDER — FENOFIBRATE 134 MG/1
CAPSULE ORAL
Qty: 90 CAPSULE | Refills: 3 | Status: SHIPPED | OUTPATIENT
Start: 2018-03-13 | End: 2019-03-01 | Stop reason: SDUPTHER

## 2018-03-26 RX ORDER — METFORMIN HYDROCHLORIDE 500 MG/1
TABLET ORAL
Qty: 90 TABLET | Refills: 2 | Status: SHIPPED | OUTPATIENT
Start: 2018-03-26 | End: 2019-01-30 | Stop reason: SDUPTHER

## 2018-03-29 ENCOUNTER — TELEPHONE (OUTPATIENT)
Dept: INTERNAL MEDICINE | Facility: CLINIC | Age: 78
End: 2018-03-29

## 2018-03-29 DIAGNOSIS — Z12.31 VISIT FOR SCREENING MAMMOGRAM: Primary | ICD-10-CM

## 2018-03-29 NOTE — TELEPHONE ENCOUNTER
----- Message from Aura Kolb sent at 3/29/2018 12:04 PM CDT -----  Contact: pt [phone]  Patient would like to schedule their annual mammogram appointment, please enter the order and contact the patient to schedule.

## 2018-04-09 ENCOUNTER — HOSPITAL ENCOUNTER (OUTPATIENT)
Dept: RADIOLOGY | Facility: HOSPITAL | Age: 78
Discharge: HOME | End: 2018-04-09
Attending: INTERNAL MEDICINE
Payer: MEDICARE

## 2018-04-09 DIAGNOSIS — Z12.31 VISIT FOR SCREENING MAMMOGRAM: ICD-10-CM

## 2018-04-09 PROCEDURE — 77067 SCR MAMMO BI INCL CAD: CPT | Mod: 26,,, | Performed by: RADIOLOGY

## 2018-04-09 PROCEDURE — 77063 BREAST TOMOSYNTHESIS BI: CPT | Mod: 26,,, | Performed by: RADIOLOGY

## 2018-04-09 PROCEDURE — 77067 SCR MAMMO BI INCL CAD: CPT | Mod: TC,PO

## 2018-05-04 ENCOUNTER — TELEPHONE (OUTPATIENT)
Dept: INTERNAL MEDICINE | Facility: CLINIC | Age: 78
End: 2018-05-04

## 2018-05-04 NOTE — TELEPHONE ENCOUNTER
----- Message from Bobbi Mejía sent at 5/4/2018 10:40 AM CDT -----  Contact: fyi  Doctor appointment and lab have been scheduled.  Please link lab orders to the lab appointment.  Date of doctor appointment:  05/15/18  Physical or EP:  physical  Date of lab appointment:  05/08/18  Comments:

## 2018-05-08 ENCOUNTER — LAB VISIT (OUTPATIENT)
Dept: LAB | Facility: HOSPITAL | Age: 78
End: 2018-05-08
Attending: INTERNAL MEDICINE
Payer: MEDICARE

## 2018-05-08 DIAGNOSIS — E55.9 VITAMIN D DEFICIENCY: ICD-10-CM

## 2018-05-08 DIAGNOSIS — E11.40 TYPE 2 DIABETES MELLITUS WITH DIABETIC NEUROPATHY, WITHOUT LONG-TERM CURRENT USE OF INSULIN: ICD-10-CM

## 2018-05-08 DIAGNOSIS — E78.5 HYPERLIPIDEMIA, UNSPECIFIED HYPERLIPIDEMIA TYPE: ICD-10-CM

## 2018-05-08 LAB
25(OH)D3+25(OH)D2 SERPL-MCNC: 38 NG/ML
ALBUMIN SERPL BCP-MCNC: 4.1 G/DL
ALP SERPL-CCNC: 73 U/L
ALT SERPL W/O P-5'-P-CCNC: 36 U/L
ANION GAP SERPL CALC-SCNC: 14 MMOL/L
AST SERPL-CCNC: 24 U/L
BASOPHILS # BLD AUTO: 0.06 K/UL
BASOPHILS NFR BLD: 0.7 %
BILIRUB SERPL-MCNC: 0.6 MG/DL
BUN SERPL-MCNC: 24 MG/DL
CALCIUM SERPL-MCNC: 9.7 MG/DL
CHLORIDE SERPL-SCNC: 103 MMOL/L
CHOLEST SERPL-MCNC: 234 MG/DL
CHOLEST/HDLC SERPL: 4.7 {RATIO}
CO2 SERPL-SCNC: 24 MMOL/L
CREAT SERPL-MCNC: 1.1 MG/DL
DIFFERENTIAL METHOD: NORMAL
EOSINOPHIL # BLD AUTO: 0.2 K/UL
EOSINOPHIL NFR BLD: 2.6 %
ERYTHROCYTE [DISTWIDTH] IN BLOOD BY AUTOMATED COUNT: 12.9 %
EST. GFR  (AFRICAN AMERICAN): 56 ML/MIN/1.73 M^2
EST. GFR  (NON AFRICAN AMERICAN): 48.5 ML/MIN/1.73 M^2
ESTIMATED AVG GLUCOSE: 160 MG/DL
GLUCOSE SERPL-MCNC: 153 MG/DL
HBA1C MFR BLD HPLC: 7.2 %
HCT VFR BLD AUTO: 39.7 %
HDLC SERPL-MCNC: 50 MG/DL
HDLC SERPL: 21.4 %
HGB BLD-MCNC: 12.9 G/DL
IMM GRANULOCYTES # BLD AUTO: 0.02 K/UL
IMM GRANULOCYTES NFR BLD AUTO: 0.2 %
LDLC SERPL CALC-MCNC: 146 MG/DL
LYMPHOCYTES # BLD AUTO: 2.7 K/UL
LYMPHOCYTES NFR BLD: 33.1 %
MCH RBC QN AUTO: 28.6 PG
MCHC RBC AUTO-ENTMCNC: 32.5 G/DL
MCV RBC AUTO: 88 FL
MONOCYTES # BLD AUTO: 0.6 K/UL
MONOCYTES NFR BLD: 8 %
NEUTROPHILS # BLD AUTO: 4.4 K/UL
NEUTROPHILS NFR BLD: 55.4 %
NONHDLC SERPL-MCNC: 184 MG/DL
NRBC BLD-RTO: 0 /100 WBC
PLATELET # BLD AUTO: 344 K/UL
PMV BLD AUTO: 10.8 FL
POTASSIUM SERPL-SCNC: 3.9 MMOL/L
PROT SERPL-MCNC: 7.3 G/DL
RBC # BLD AUTO: 4.51 M/UL
SODIUM SERPL-SCNC: 141 MMOL/L
T4 FREE SERPL-MCNC: 1.11 NG/DL
TRIGL SERPL-MCNC: 190 MG/DL
TSH SERPL DL<=0.005 MIU/L-ACNC: 4.62 UIU/ML
WBC # BLD AUTO: 8.03 K/UL

## 2018-05-08 PROCEDURE — 83036 HEMOGLOBIN GLYCOSYLATED A1C: CPT

## 2018-05-08 PROCEDURE — 84443 ASSAY THYROID STIM HORMONE: CPT

## 2018-05-08 PROCEDURE — 84439 ASSAY OF FREE THYROXINE: CPT

## 2018-05-08 PROCEDURE — 82306 VITAMIN D 25 HYDROXY: CPT

## 2018-05-08 PROCEDURE — 36415 COLL VENOUS BLD VENIPUNCTURE: CPT | Mod: PO

## 2018-05-08 PROCEDURE — 80053 COMPREHEN METABOLIC PANEL: CPT

## 2018-05-08 PROCEDURE — 85025 COMPLETE CBC W/AUTO DIFF WBC: CPT

## 2018-05-08 PROCEDURE — 80061 LIPID PANEL: CPT

## 2018-05-09 ENCOUNTER — TELEPHONE (OUTPATIENT)
Dept: INTERNAL MEDICINE | Facility: CLINIC | Age: 78
End: 2018-05-09

## 2018-05-09 NOTE — TELEPHONE ENCOUNTER
----- Message from Karina Wilson MD sent at 5/8/2018  5:46 PM CDT -----  Please review your lab work and we will discuss at your pending office visit.  Karina Cabrales

## 2018-05-15 ENCOUNTER — OFFICE VISIT (OUTPATIENT)
Dept: INTERNAL MEDICINE | Facility: CLINIC | Age: 78
End: 2018-05-15
Payer: MEDICARE

## 2018-05-15 VITALS
SYSTOLIC BLOOD PRESSURE: 142 MMHG | OXYGEN SATURATION: 97 % | BODY MASS INDEX: 29.23 KG/M2 | RESPIRATION RATE: 16 BRPM | HEART RATE: 89 BPM | TEMPERATURE: 99 F | WEIGHT: 181.88 LBS | DIASTOLIC BLOOD PRESSURE: 62 MMHG | HEIGHT: 66 IN

## 2018-05-15 DIAGNOSIS — E04.2 NONTOXIC MULTINODULAR GOITER: ICD-10-CM

## 2018-05-15 DIAGNOSIS — E78.2 MIXED HYPERLIPIDEMIA: Chronic | ICD-10-CM

## 2018-05-15 DIAGNOSIS — E11.59 TYPE 2 DIABETES MELLITUS WITH OTHER CIRCULATORY COMPLICATION, WITHOUT LONG-TERM CURRENT USE OF INSULIN: ICD-10-CM

## 2018-05-15 DIAGNOSIS — R80.9 TYPE 2 DIABETES MELLITUS WITH MICROALBUMINURIA, WITHOUT LONG-TERM CURRENT USE OF INSULIN: ICD-10-CM

## 2018-05-15 DIAGNOSIS — N18.30 CHRONIC KIDNEY DISEASE, STAGE III (MODERATE): ICD-10-CM

## 2018-05-15 DIAGNOSIS — I10 ESSENTIAL HYPERTENSION: ICD-10-CM

## 2018-05-15 DIAGNOSIS — Z78.0 POSTMENOPAUSAL ESTROGEN DEFICIENCY: ICD-10-CM

## 2018-05-15 DIAGNOSIS — E11.29 TYPE 2 DIABETES MELLITUS WITH MICROALBUMINURIA, WITHOUT LONG-TERM CURRENT USE OF INSULIN: ICD-10-CM

## 2018-05-15 DIAGNOSIS — Z00.00 ENCOUNTER FOR ANNUAL PHYSICAL EXAM: Primary | ICD-10-CM

## 2018-05-15 DIAGNOSIS — I70.0 AORTIC ATHEROSCLEROSIS: ICD-10-CM

## 2018-05-15 PROCEDURE — 99397 PER PM REEVAL EST PAT 65+ YR: CPT | Mod: S$GLB,,, | Performed by: INTERNAL MEDICINE

## 2018-05-15 PROCEDURE — 3078F DIAST BP <80 MM HG: CPT | Mod: CPTII,S$GLB,, | Performed by: INTERNAL MEDICINE

## 2018-05-15 PROCEDURE — 3077F SYST BP >= 140 MM HG: CPT | Mod: CPTII,S$GLB,, | Performed by: INTERNAL MEDICINE

## 2018-05-15 PROCEDURE — 99999 PR PBB SHADOW E&M-EST. PATIENT-LVL III: CPT | Mod: PBBFAC,,,

## 2018-05-15 NOTE — PROGRESS NOTES
Subjective:       Patient ID: Zaida Liu is a 78 y.o. female.    Chief Complaint: Annual Exam (physical )    CC: Annual PE  79 yo female presents for PE  Ex-smoker having quit a number of years ago, social alcohol consumer.                                                                                        FAMILY HISTORY:    Mom d. 85, CHF.    Dad d. 60, MI, he was alcoholic.    Older sister d. Colon cancer.  She had a blood clot.    Sister, + RA, in poor health, lung cancer s/p chemo.    Sister, s/p bypass      Brother d. 83 MI, s/p stent and a bypass.  + DM    6th /6 children                         SCREENING TEST:    Cholesterol/lab, reviewed  Colonoscopy 10/2011, Dr. Caldwell.  He recommended 7 years.   Dr. Clary JAMES            Mammogram, gyn  DEXA, 2014  IMPRESSION:  Normal BMD at the total hip and lumbar spine. DJD at the lumbar spine can falsely elevate BMD.   There is a 15% loss of BMD at the total hip when compared to previous study done in 2002.     RECOMMENDATIONS:  1) Adequate calcium and Vitamin D therapy  2) Appropriate exercise  3) Consider repeat BMD in 2- 4 years.     Thyroid US/ FNA, 6/2015 benign. Repeat US 11/2017:Large isthmus thyroid nodule.  RECOMMENDATIONS: Consider repeating isthmus nodule FNA because of size.    Eye exam, UTD  DDS exam, UTD  VACCINATIONS:   Zostavax, 4/2011   Tdap, 2/2014.     Pneumovax, 2006      Flu, yearly                                                                                                  MEDS:  In the MedCard.      Review of Systems   Constitutional: Negative for chills and fever.   HENT: Negative for congestion and sinus pressure.    Eyes: Negative for visual disturbance.   Respiratory: Negative for chest tightness and shortness of breath.    Cardiovascular: Negative for chest pain and palpitations.   Gastrointestinal: Negative for abdominal pain, anal bleeding, constipation and diarrhea.   Genitourinary: Negative for difficulty urinating and  dysuria.   Musculoskeletal: Positive for arthralgias and back pain. Negative for gait problem.   Skin: Negative for rash.   Neurological: Positive for dizziness. Negative for weakness, light-headedness and headaches.       Objective:      Physical Exam   Constitutional: She is oriented to person, place, and time. She appears well-developed and well-nourished. No distress.   HENT:   Head: Normocephalic and atraumatic.   Mouth/Throat: No oropharyngeal exudate.   Eyes: Conjunctivae are normal. No scleral icterus.   Neck: Normal range of motion. Neck supple. Thyromegaly present.   Cardiovascular: Normal rate and regular rhythm.    Pulmonary/Chest: Effort normal and breath sounds normal. No respiratory distress. She has no wheezes. She has no rales.   Abdominal: Soft. Bowel sounds are normal. She exhibits no distension. There is no tenderness.   Musculoskeletal: She exhibits edema. She exhibits no tenderness or deformity.   Lymphadenopathy:     She has no cervical adenopathy.   Neurological: She is alert and oriented to person, place, and time. No cranial nerve deficit.   Skin: Skin is warm and dry. No rash noted. She is not diaphoretic. No erythema.   Vitals reviewed.      Assessment:       1. Encounter for annual physical exam    2. Essential hypertension    3. Type 2 diabetes mellitus with other circulatory complication, without long-term current use of insulin    4. Chronic kidney disease, stage III (moderate)    5. Mixed hyperlipidemia    6. Aortic atherosclerosis    7. Type 2 diabetes mellitus with microalbuminuria, without long-term current use of insulin    8. Nontoxic multinodular goiter    9. Postmenopausal estrogen deficiency        Plan:       Continue present meds  DEXA  Continue f/u w/ cards and endo  Diabetic diet  Exercise  Low salt diet  Call prn  RTC 6 mos      Dr. Gregg, PGY-3    I have interviewed and examined the patient w/ the resident,   I agree w/ the impression and plan as outlined  above.  Karina Cabrales MD - Staff

## 2018-05-21 ENCOUNTER — HOSPITAL ENCOUNTER (OUTPATIENT)
Dept: RADIOLOGY | Facility: CLINIC | Age: 78
Discharge: HOME | End: 2018-05-21
Attending: INTERNAL MEDICINE
Payer: MEDICARE

## 2018-05-21 DIAGNOSIS — Z78.0 POSTMENOPAUSAL ESTROGEN DEFICIENCY: ICD-10-CM

## 2018-05-21 PROCEDURE — 77080 DXA BONE DENSITY AXIAL: CPT | Mod: 26,,, | Performed by: INTERNAL MEDICINE

## 2018-05-21 PROCEDURE — 77080 DXA BONE DENSITY AXIAL: CPT | Mod: TC,PO

## 2018-05-29 ENCOUNTER — PES CALL (OUTPATIENT)
Dept: ADMINISTRATIVE | Facility: CLINIC | Age: 78
End: 2018-05-29

## 2018-05-29 ENCOUNTER — TELEPHONE (OUTPATIENT)
Dept: INTERNAL MEDICINE | Facility: CLINIC | Age: 78
End: 2018-05-29

## 2018-05-29 NOTE — TELEPHONE ENCOUNTER
----- Message from Karina Wilson MD sent at 5/28/2018  8:39 PM CDT -----  Please note that your bone density revealed-------Normal BMD.    RECOMMENDATIONS of [facility]:  1.  Calcium 0498-0153 mg daily and vitamin D 800-1000 units daily, adequate exercise.  2.  Recommended therapy No additional pharmacologic therapy recommended at this time.  3.  No need to repeat BMD in near future unless clinical change    Please do not hesitate to call/email if you have any questions or concerns  Karina Cabrales

## 2018-06-07 ENCOUNTER — CLINICAL SUPPORT (OUTPATIENT)
Dept: CARDIOLOGY | Facility: CLINIC | Age: 78
End: 2018-06-07
Attending: INTERNAL MEDICINE
Payer: MEDICARE

## 2018-06-07 ENCOUNTER — LAB VISIT (OUTPATIENT)
Dept: LAB | Facility: HOSPITAL | Age: 78
End: 2018-06-07
Attending: INTERNAL MEDICINE
Payer: MEDICARE

## 2018-06-07 ENCOUNTER — TELEPHONE (OUTPATIENT)
Dept: CARDIOLOGY | Facility: CLINIC | Age: 78
End: 2018-06-07

## 2018-06-07 DIAGNOSIS — E04.2 NONTOXIC MULTINODULAR GOITER: ICD-10-CM

## 2018-06-07 DIAGNOSIS — I35.0 NONRHEUMATIC AORTIC VALVE STENOSIS: ICD-10-CM

## 2018-06-07 DIAGNOSIS — E78.2 MIXED HYPERLIPIDEMIA: Chronic | ICD-10-CM

## 2018-06-07 DIAGNOSIS — Z79.4 CONTROLLED TYPE 2 DIABETES MELLITUS WITH OTHER CIRCULATORY COMPLICATION, WITH LONG-TERM CURRENT USE OF INSULIN: ICD-10-CM

## 2018-06-07 DIAGNOSIS — I70.0 AORTIC ATHEROSCLEROSIS: ICD-10-CM

## 2018-06-07 DIAGNOSIS — I77.9 BILATERAL CAROTID ARTERY DISEASE: ICD-10-CM

## 2018-06-07 DIAGNOSIS — I10 ESSENTIAL HYPERTENSION: ICD-10-CM

## 2018-06-07 DIAGNOSIS — N18.30 CHRONIC KIDNEY DISEASE, STAGE III (MODERATE): ICD-10-CM

## 2018-06-07 DIAGNOSIS — E11.59 CONTROLLED TYPE 2 DIABETES MELLITUS WITH OTHER CIRCULATORY COMPLICATION, WITH LONG-TERM CURRENT USE OF INSULIN: ICD-10-CM

## 2018-06-07 DIAGNOSIS — Z95.2 S/P AVR (AORTIC VALVE REPLACEMENT): ICD-10-CM

## 2018-06-07 DIAGNOSIS — I51.89 LEFT VENTRICULAR DIASTOLIC DYSFUNCTION WITH PRESERVED SYSTOLIC FUNCTION: ICD-10-CM

## 2018-06-07 LAB
ALBUMIN SERPL BCP-MCNC: 4.3 G/DL
ALP SERPL-CCNC: 58 U/L
ALT SERPL W/O P-5'-P-CCNC: 35 U/L
ANION GAP SERPL CALC-SCNC: 11 MMOL/L
AST SERPL-CCNC: 26 U/L
BILIRUB SERPL-MCNC: 0.7 MG/DL
BUN SERPL-MCNC: 29 MG/DL
CALCIUM SERPL-MCNC: 10 MG/DL
CHLORIDE SERPL-SCNC: 100 MMOL/L
CHOLEST SERPL-MCNC: 226 MG/DL
CHOLEST/HDLC SERPL: 4.8 {RATIO}
CO2 SERPL-SCNC: 28 MMOL/L
CREAT SERPL-MCNC: 1.2 MG/DL
EST. GFR  (AFRICAN AMERICAN): 50.4 ML/MIN/1.73 M^2
EST. GFR  (NON AFRICAN AMERICAN): 43.7 ML/MIN/1.73 M^2
ESTIMATED AVG GLUCOSE: 166 MG/DL
GLUCOSE SERPL-MCNC: 152 MG/DL
HBA1C MFR BLD HPLC: 7.4 %
HDLC SERPL-MCNC: 47 MG/DL
HDLC SERPL: 20.8 %
INTERNAL CAROTID STENOSIS: NORMAL
LDLC SERPL CALC-MCNC: 142.6 MG/DL
NONHDLC SERPL-MCNC: 179 MG/DL
POTASSIUM SERPL-SCNC: 4 MMOL/L
PROT SERPL-MCNC: 7.4 G/DL
SODIUM SERPL-SCNC: 139 MMOL/L
TRIGL SERPL-MCNC: 182 MG/DL
TSH SERPL DL<=0.005 MIU/L-ACNC: 3.09 UIU/ML

## 2018-06-07 PROCEDURE — 84443 ASSAY THYROID STIM HORMONE: CPT

## 2018-06-07 PROCEDURE — 80053 COMPREHEN METABOLIC PANEL: CPT

## 2018-06-07 PROCEDURE — 80061 LIPID PANEL: CPT

## 2018-06-07 PROCEDURE — 83036 HEMOGLOBIN GLYCOSYLATED A1C: CPT

## 2018-06-07 PROCEDURE — 36415 COLL VENOUS BLD VENIPUNCTURE: CPT | Mod: PO

## 2018-06-07 PROCEDURE — 93880 EXTRACRANIAL BILAT STUDY: CPT | Mod: S$GLB,,, | Performed by: INTERNAL MEDICINE

## 2018-06-07 NOTE — TELEPHONE ENCOUNTER
----- Message from Cristela Gray MD sent at 6/7/2018  2:18 PM CDT -----  Please review.  We will discuss the results during your upcoming visit with me. It is essentially unchanged from the last one

## 2018-06-07 NOTE — TELEPHONE ENCOUNTER
----- Message from Cristela Gray MD sent at 6/7/2018 12:59 PM CDT -----  Please review.  We will discuss the results during your upcoming visit with me. It shows mild bilateral blockages in the carotids

## 2018-06-07 NOTE — TELEPHONE ENCOUNTER
----- Message from Cristela Gray MD sent at 6/7/2018  3:36 PM CDT -----  Diabetes is not well controlled

## 2018-06-14 ENCOUNTER — OFFICE VISIT (OUTPATIENT)
Dept: CARDIOLOGY | Facility: CLINIC | Age: 78
End: 2018-06-14
Payer: MEDICARE

## 2018-06-14 VITALS
HEART RATE: 72 BPM | WEIGHT: 180 LBS | BODY MASS INDEX: 28.93 KG/M2 | SYSTOLIC BLOOD PRESSURE: 172 MMHG | DIASTOLIC BLOOD PRESSURE: 72 MMHG | HEIGHT: 66 IN

## 2018-06-14 DIAGNOSIS — I77.9 BILATERAL CAROTID ARTERY DISEASE: ICD-10-CM

## 2018-06-14 DIAGNOSIS — Z79.4 CONTROLLED TYPE 2 DIABETES MELLITUS WITH OTHER CIRCULATORY COMPLICATION, WITH LONG-TERM CURRENT USE OF INSULIN: ICD-10-CM

## 2018-06-14 DIAGNOSIS — I70.0 AORTIC ATHEROSCLEROSIS: ICD-10-CM

## 2018-06-14 DIAGNOSIS — I10 ESSENTIAL HYPERTENSION: Primary | ICD-10-CM

## 2018-06-14 DIAGNOSIS — N18.30 CHRONIC KIDNEY DISEASE, STAGE III (MODERATE): ICD-10-CM

## 2018-06-14 DIAGNOSIS — E11.29 TYPE 2 DIABETES MELLITUS WITH MICROALBUMINURIA, WITHOUT LONG-TERM CURRENT USE OF INSULIN: ICD-10-CM

## 2018-06-14 DIAGNOSIS — E11.59 CONTROLLED TYPE 2 DIABETES MELLITUS WITH OTHER CIRCULATORY COMPLICATION, WITH LONG-TERM CURRENT USE OF INSULIN: ICD-10-CM

## 2018-06-14 DIAGNOSIS — Z95.2 S/P AVR (AORTIC VALVE REPLACEMENT): ICD-10-CM

## 2018-06-14 DIAGNOSIS — I35.0 NONRHEUMATIC AORTIC VALVE STENOSIS: ICD-10-CM

## 2018-06-14 DIAGNOSIS — E04.2 NONTOXIC MULTINODULAR GOITER: ICD-10-CM

## 2018-06-14 DIAGNOSIS — E78.2 MIXED HYPERLIPIDEMIA: Chronic | ICD-10-CM

## 2018-06-14 DIAGNOSIS — R80.9 TYPE 2 DIABETES MELLITUS WITH MICROALBUMINURIA, WITHOUT LONG-TERM CURRENT USE OF INSULIN: ICD-10-CM

## 2018-06-14 DIAGNOSIS — I51.89 LEFT VENTRICULAR DIASTOLIC DYSFUNCTION WITH PRESERVED SYSTOLIC FUNCTION: ICD-10-CM

## 2018-06-14 PROCEDURE — 3078F DIAST BP <80 MM HG: CPT | Mod: CPTII,S$GLB,, | Performed by: INTERNAL MEDICINE

## 2018-06-14 PROCEDURE — 99999 PR PBB SHADOW E&M-EST. PATIENT-LVL III: CPT | Mod: PBBFAC,,, | Performed by: INTERNAL MEDICINE

## 2018-06-14 PROCEDURE — 99499 UNLISTED E&M SERVICE: CPT | Mod: S$GLB,,, | Performed by: INTERNAL MEDICINE

## 2018-06-14 PROCEDURE — 3077F SYST BP >= 140 MM HG: CPT | Mod: CPTII,S$GLB,, | Performed by: INTERNAL MEDICINE

## 2018-06-14 PROCEDURE — 99214 OFFICE O/P EST MOD 30 MIN: CPT | Mod: S$GLB,,, | Performed by: INTERNAL MEDICINE

## 2018-06-14 RX ORDER — LISINOPRIL 10 MG/1
10 TABLET ORAL DAILY
COMMUNITY
End: 2018-06-14 | Stop reason: SDUPTHER

## 2018-06-14 RX ORDER — HYDROCHLOROTHIAZIDE 50 MG/1
50 TABLET ORAL DAILY
COMMUNITY
End: 2018-06-22 | Stop reason: SDUPTHER

## 2018-06-14 RX ORDER — LISINOPRIL 10 MG/1
10 TABLET ORAL DAILY
Qty: 30 TABLET | Refills: 6 | Status: SHIPPED | OUTPATIENT
Start: 2018-06-14 | End: 2018-06-22 | Stop reason: SDUPTHER

## 2018-06-14 NOTE — PROGRESS NOTES
"Subjective:   Patient ID:  Zaida Liu is a 78 y.o. female who presents for follow-up of Hypertension and Dizziness      HPI: Patient is here for follow-up of elevated blood pressure. She is not exercising and is not adherent to a low-salt diet. Blood pressure is not well controlled at home. Patient denies chest pain, dyspnea, palpitations, lower extremity edema.  She has chronic dizziness that improves with Meclizine and chronic tinnitus, that is "always there"    Today BP is elevated.  Patient admits to dietary indiscretions.      Lab Results   Component Value Date     06/07/2018    K 4.0 06/07/2018     06/07/2018    CO2 28 06/07/2018    BUN 29 (H) 06/07/2018    CREATININE 1.2 06/07/2018     (H) 06/07/2018    HGBA1C 7.4 (H) 06/07/2018    MG 1.5 (L) 12/15/2015    AST 26 06/07/2018    ALT 35 06/07/2018    ALBUMIN 4.3 06/07/2018    PROT 7.4 06/07/2018    BILITOT 0.7 06/07/2018    WBC 8.03 05/08/2018    HGB 12.9 05/08/2018    HCT 39.7 05/08/2018    HCT 28 (L) 08/25/2015    MCV 88 05/08/2018     05/08/2018    INR 1.0 12/15/2015    TSH 3.094 06/07/2018    CHOL 226 (H) 06/07/2018    HDL 47 06/07/2018    LDLCALC 142.6 06/07/2018    TRIG 182 (H) 06/07/2018       Review of Systems   Constitution: Positive for weight gain.   HENT: Negative.    Eyes: Negative.    Cardiovascular: Positive for leg swelling and palpitations. Negative for chest pain, claudication, dyspnea on exertion, irregular heartbeat, near-syncope and syncope.   Respiratory: Negative.  Negative for cough, shortness of breath, snoring and wheezing.    Endocrine: Negative.  Negative for cold intolerance, heat intolerance, polydipsia, polyphagia and polyuria.   Skin: Negative.    Musculoskeletal: Positive for arthritis and falls.   Gastrointestinal: Negative.    Genitourinary: Positive for bladder incontinence.   Neurological: Positive for dizziness, light-headedness and loss of balance.   Psychiatric/Behavioral: Negative.  "       Objective:   Physical Exam   Neck: Carotid bruit is present.   Cardiovascular:   Murmur heard.   Harsh midsystolic murmur is present with a grade of 2/6  at the upper right sternal border radiating to the neck  Pulses:       Carotid pulses are on the right side with bruit, and on the left side with bruit.        Assessment and Plan:     Problem List Items Addressed This Visit        Cardiology Problems    Hyperlipidemia (Chronic)    Aortic stenosis    Essential hypertension - Primary    Left ventricular diastolic dysfunction with preserved systolic function    Aortic atherosclerosis    Bilateral carotid artery disease       Other    Nontoxic multinodular goiter    S/P AVR (aortic valve replacement)    Chronic kidney disease, stage III (moderate)    Type 2 diabetes mellitus with microalbuminuria, without long-term current use of insulin      Other Visit Diagnoses     Controlled type 2 diabetes mellitus with other circulatory complication, with long-term current use of insulin              Patient's Medications   New Prescriptions    No medications on file   Previous Medications    AMLODIPINE (NORVASC) 10 MG TABLET    TAKE 1 TABLET ONE TIME DAILY    ASPIRIN (ECOTRIN) 81 MG EC TABLET    Take 81 mg by mouth once daily.    ATORVASTATIN (LIPITOR) 40 MG TABLET    TAKE 1 TABLET  NIGHTLY    FENOFIBRATE MICRONIZED (LOFIBRA) 134 MG CAP    TAKE 1 CAPSULE EVERY DAY  WITH  BREAKFAST    HYDROCHLOROTHIAZIDE (HYDRODIURIL) 50 MG TABLET    Take 50 mg by mouth once daily.    LISINOPRIL 10 MG TABLET    Take 10 mg by mouth once daily.    MECLIZINE HCL (MECLIZINE ORAL)    Take by mouth once daily. Pt unsure of dosage.    METFORMIN (GLUCOPHAGE) 500 MG TABLET    TAKE 1 TABLET EVERY DAY    METOPROLOL TARTRATE (LOPRESSOR) 25 MG TABLET    TAKE 1 TABLET TWICE DAILY    OMEPRAZOLE (PRILOSEC) 20 MG CAPSULE    Take 20 mg by mouth every morning.     VITAMIN D 1000 UNITS TAB    Take 1,000 Units by mouth once daily.   Modified Medications    No  medications on file   Discontinued Medications    HYDROCHLOROTHIAZIDE (HYDRODIURIL) 25 MG TABLET    TAKE 1 TABLET EVERY DAY   Dietary compliance and avoid salt encouraged  Add low dose ACEi and check BMP in 1 week  Patient is to keep BP log and call if out of range  Follow-up in about 1 year (around 6/14/2019).

## 2018-06-20 ENCOUNTER — OFFICE VISIT (OUTPATIENT)
Dept: FAMILY MEDICINE | Facility: CLINIC | Age: 78
End: 2018-06-20
Payer: MEDICARE

## 2018-06-20 VITALS
DIASTOLIC BLOOD PRESSURE: 50 MMHG | TEMPERATURE: 98 F | WEIGHT: 181 LBS | OXYGEN SATURATION: 95 % | SYSTOLIC BLOOD PRESSURE: 140 MMHG | BODY MASS INDEX: 29.09 KG/M2 | HEIGHT: 66 IN | HEART RATE: 69 BPM

## 2018-06-20 DIAGNOSIS — R80.9 TYPE 2 DIABETES MELLITUS WITH MICROALBUMINURIA, WITHOUT LONG-TERM CURRENT USE OF INSULIN: ICD-10-CM

## 2018-06-20 DIAGNOSIS — I70.0 AORTIC ATHEROSCLEROSIS: Primary | ICD-10-CM

## 2018-06-20 DIAGNOSIS — Z00.00 ENCOUNTER FOR PREVENTIVE HEALTH EXAMINATION: ICD-10-CM

## 2018-06-20 DIAGNOSIS — I77.9 BILATERAL CAROTID ARTERY DISEASE: ICD-10-CM

## 2018-06-20 DIAGNOSIS — E11.29 TYPE 2 DIABETES MELLITUS WITH MICROALBUMINURIA, WITHOUT LONG-TERM CURRENT USE OF INSULIN: ICD-10-CM

## 2018-06-20 PROCEDURE — 3078F DIAST BP <80 MM HG: CPT | Mod: CPTII,S$GLB,, | Performed by: NURSE PRACTITIONER

## 2018-06-20 PROCEDURE — 99999 PR PBB SHADOW E&M-EST. PATIENT-LVL IV: CPT | Mod: PBBFAC,,, | Performed by: NURSE PRACTITIONER

## 2018-06-20 PROCEDURE — 3077F SYST BP >= 140 MM HG: CPT | Mod: CPTII,S$GLB,, | Performed by: NURSE PRACTITIONER

## 2018-06-20 PROCEDURE — 99499 UNLISTED E&M SERVICE: CPT | Mod: S$PBB,,, | Performed by: NURSE PRACTITIONER

## 2018-06-20 PROCEDURE — G0439 PPPS, SUBSEQ VISIT: HCPCS | Mod: S$GLB,,, | Performed by: NURSE PRACTITIONER

## 2018-06-20 NOTE — PATIENT INSTRUCTIONS
Counseling and Referral of Other Preventative  (Italic type indicates deductible and co-insurance are waived)    Patient Name: Zaida Liu  Today's Date: 6/20/2018    Health Maintenance       Date Due Completion Date    Influenza Vaccine 08/01/2018 10/2/2017    Override on 10/26/2015: Done    Foot Exam 10/02/2018 10/2/2017 (Done)    Override on 10/2/2017: Done    Override on 10/4/2016: Done    Override on 10/26/2015: Done    Eye Exam 12/01/2018 12/1/2017    Override on 10/26/2015: Done (12/2014 Dr. Lydia Ragland, 12/2015 o/v pending)    Hemoglobin A1c 12/07/2018 6/7/2018    Lipid Panel 06/07/2019 6/7/2018    DEXA SCAN 05/21/2021 5/21/2018    TETANUS VACCINE 02/18/2024 2/18/2014        No orders of the defined types were placed in this encounter.    The following information is provided to all patients.  This information is to help you find resources for any of the problems found today that may be affecting your health:                Living healthy guide: www.[facility].[place].gov      Understanding Diabetes: www.diabetes.org      Eating healthy: www.cdc.gov/healthyweight      CDC home safety checklist: www.cdc.gov/steadi/patient.html      Agency on Aging: www.goea.[place].[facility]      Alcoholics anonymous (AA): www.aa.org      Physical Activity: www.juan.nih.gov/hd8qsxc      Tobacco use: www.quitwithusla.org

## 2018-06-20 NOTE — PROGRESS NOTES
"Zaida Liu presented for a  Medicare AWV and comprehensive Health Risk Assessment today. The following components were reviewed and updated:    · Medical history  · Family History  · Social history  · Allergies and Current Medications  · Health Risk Assessment  · Health Maintenance  · Care Team     ** See Completed Assessments for Annual Wellness Visit within the encounter summary.**       The following assessments were completed:  · Living Situation  · CAGE  · Depression Screening  · Timed Get Up and Go  · Whisper Test  · Cognitive Function Screening  · Nutrition Screening  · ADL Screening  · PAQ Screening    Vitals:    06/20/18 0915   BP: (!) 140/50   Pulse: 69   Temp: 98.4 °F (36.9 °C)   TempSrc: Oral   SpO2: 95%   Weight: 82.1 kg (181 lb)   Height: 5' 6" (1.676 m)     Body mass index is 29.21 kg/m².  Physical Exam   Constitutional: She is oriented to person, place, and time. She appears well-developed.   HENT:   Head: Normocephalic.   Eyes: Pupils are equal, round, and reactive to light.   Neck: Normal range of motion.   Cardiovascular: Normal rate, regular rhythm and normal heart sounds.    Pulmonary/Chest: Effort normal and breath sounds normal.   Abdominal: Soft. Bowel sounds are normal.   Musculoskeletal: Normal range of motion.   Neurological: She is alert and oriented to person, place, and time.   Skin: Skin is warm and dry. Capillary refill takes less than 2 seconds.   Psychiatric: She has a normal mood and affect. Her behavior is normal. Judgment and thought content normal.   Vitals reviewed.            Diagnoses and health risks identified today and associated recommendations/orders:    1. Aortic atherosclerosis  Stable and controlled. Continue current treatment plan as previously prescribed with your PCP.        2. Bilateral carotid artery disease  Stable and controlled. Continue current treatment plan as previously prescribed with your PCP.    3. Type 2 diabetes mellitus with microalbuminuria, " without long-term current use of insulin  Stable and controlled. Continue current treatment plan as previously prescribed with your PCP.    4. Encounter for preventive health examination  Provided Anise with a 5-10 year written screening schedule and personal prevention plan. Recommendations were developed using the USPSTF age appropriate recommendations. Education, counseling, and referrals were provided as needed. After Visit Summary printed and given to patient which includes a list of additional screenings\tests needed.    Follow-up if symptoms worsen or fail to improve.    Kam Malhotra NP

## 2018-06-21 ENCOUNTER — LAB VISIT (OUTPATIENT)
Dept: LAB | Facility: HOSPITAL | Age: 78
End: 2018-06-21
Attending: INTERNAL MEDICINE
Payer: MEDICARE

## 2018-06-21 DIAGNOSIS — I10 ESSENTIAL HYPERTENSION: ICD-10-CM

## 2018-06-21 LAB
ANION GAP SERPL CALC-SCNC: 9 MMOL/L
BUN SERPL-MCNC: 30 MG/DL
CALCIUM SERPL-MCNC: 9.5 MG/DL
CHLORIDE SERPL-SCNC: 103 MMOL/L
CO2 SERPL-SCNC: 28 MMOL/L
CREAT SERPL-MCNC: 1.2 MG/DL
EST. GFR  (AFRICAN AMERICAN): 50.4 ML/MIN/1.73 M^2
EST. GFR  (NON AFRICAN AMERICAN): 43.7 ML/MIN/1.73 M^2
GLUCOSE SERPL-MCNC: 151 MG/DL
POTASSIUM SERPL-SCNC: 4.3 MMOL/L
SODIUM SERPL-SCNC: 140 MMOL/L

## 2018-06-21 PROCEDURE — 80048 BASIC METABOLIC PNL TOTAL CA: CPT

## 2018-06-21 PROCEDURE — 36415 COLL VENOUS BLD VENIPUNCTURE: CPT | Mod: PO

## 2018-06-22 ENCOUNTER — TELEPHONE (OUTPATIENT)
Dept: CARDIOLOGY | Facility: CLINIC | Age: 78
End: 2018-06-22

## 2018-06-22 DIAGNOSIS — I10 ESSENTIAL HYPERTENSION: ICD-10-CM

## 2018-06-22 RX ORDER — HYDROCHLOROTHIAZIDE 50 MG/1
50 TABLET ORAL DAILY
Qty: 90 TABLET | Refills: 3 | Status: SHIPPED | OUTPATIENT
Start: 2018-06-22 | End: 2019-05-30 | Stop reason: SDUPTHER

## 2018-06-22 RX ORDER — LISINOPRIL 10 MG/1
10 TABLET ORAL DAILY
Qty: 90 TABLET | Refills: 3 | Status: SHIPPED | OUTPATIENT
Start: 2018-06-22 | End: 2019-07-23 | Stop reason: SDUPTHER

## 2018-07-10 ENCOUNTER — TELEPHONE (OUTPATIENT)
Dept: CARDIOLOGY | Facility: CLINIC | Age: 78
End: 2018-07-10

## 2018-07-10 NOTE — TELEPHONE ENCOUNTER
I called pt to get home b/p readings. Pt reports all readings are in the morning about 1 -1 1/2 hour after medications. Please advise.   6/22/18 132/66 P66  6/23/18 122/70 P69  6/24/18 134/63 P66  6/25/18 124/64 P66  6/26/18 129/73 P60  6/27/18 123/65 P69  6/28/18 125/69 P61  6/29/18 122/64 P60  6/30/18 130/74 P66  7/1/18 130/71 P64  7/2/18 133/73 P66  7/3/18 130/67 P60

## 2018-07-17 ENCOUNTER — TELEPHONE (OUTPATIENT)
Dept: ENDOCRINOLOGY | Facility: CLINIC | Age: 78
End: 2018-07-17

## 2018-07-17 NOTE — TELEPHONE ENCOUNTER
Left a message to tell pt that dr liriano will not be at [facility] started in October and our November booked are not open yet

## 2018-07-17 NOTE — TELEPHONE ENCOUNTER
----- Message from Carlotta Partida sent at 7/17/2018 12:05 PM CDT -----  Contact: self  Pt is calling in regards to scheduling an appt. Books aren't currently open to schedule this appt. Pt would like a call back to schedule this appt for November.     Pt can be reached at [phone].    Thank you

## 2018-09-06 ENCOUNTER — TELEPHONE (OUTPATIENT)
Dept: ENDOCRINOLOGY | Facility: CLINIC | Age: 78
End: 2018-09-06

## 2018-09-06 NOTE — TELEPHONE ENCOUNTER
----- Message from Machelle An sent at 9/6/2018  2:16 PM CDT -----  Contact: Self  Pt is calling to be scheduled for an appt from her Recall in Epic; however,  was unable to do so due to an exhausted template.    She can be reached at [phone].    Thank you.

## 2018-09-11 RX ORDER — AMLODIPINE BESYLATE 10 MG/1
TABLET ORAL
Qty: 90 TABLET | Refills: 3 | Status: SHIPPED | OUTPATIENT
Start: 2018-09-11 | End: 2019-09-05 | Stop reason: SDUPTHER

## 2018-10-16 RX ORDER — ATORVASTATIN CALCIUM 40 MG/1
TABLET, FILM COATED ORAL
Qty: 90 TABLET | Refills: 3 | Status: SHIPPED | OUTPATIENT
Start: 2018-10-16 | End: 2019-10-23 | Stop reason: SDUPTHER

## 2018-11-13 ENCOUNTER — OFFICE VISIT (OUTPATIENT)
Dept: ENDOCRINOLOGY | Facility: CLINIC | Age: 78
End: 2018-11-13
Payer: MEDICARE

## 2018-11-13 VITALS
DIASTOLIC BLOOD PRESSURE: 62 MMHG | BODY MASS INDEX: 29.15 KG/M2 | SYSTOLIC BLOOD PRESSURE: 132 MMHG | HEART RATE: 72 BPM | HEIGHT: 66 IN | WEIGHT: 181.38 LBS

## 2018-11-13 DIAGNOSIS — E04.2 NONTOXIC MULTINODULAR GOITER: Primary | ICD-10-CM

## 2018-11-13 DIAGNOSIS — R80.9 TYPE 2 DIABETES MELLITUS WITH MICROALBUMINURIA, WITHOUT LONG-TERM CURRENT USE OF INSULIN: ICD-10-CM

## 2018-11-13 DIAGNOSIS — E11.29 TYPE 2 DIABETES MELLITUS WITH MICROALBUMINURIA, WITHOUT LONG-TERM CURRENT USE OF INSULIN: ICD-10-CM

## 2018-11-13 PROCEDURE — 3078F DIAST BP <80 MM HG: CPT | Mod: CPTII,HCNC,S$GLB, | Performed by: INTERNAL MEDICINE

## 2018-11-13 PROCEDURE — 99214 OFFICE O/P EST MOD 30 MIN: CPT | Mod: HCNC,S$GLB,, | Performed by: INTERNAL MEDICINE

## 2018-11-13 PROCEDURE — 99999 PR PBB SHADOW E&M-EST. PATIENT-LVL III: CPT | Mod: PBBFAC,HCNC,, | Performed by: INTERNAL MEDICINE

## 2018-11-13 PROCEDURE — 1101F PT FALLS ASSESS-DOCD LE1/YR: CPT | Mod: CPTII,HCNC,S$GLB, | Performed by: INTERNAL MEDICINE

## 2018-11-13 PROCEDURE — 3075F SYST BP GE 130 - 139MM HG: CPT | Mod: CPTII,HCNC,S$GLB, | Performed by: INTERNAL MEDICINE

## 2018-11-13 NOTE — PROGRESS NOTES
Subjective:     Patient ID: Zaida Liu is a 78 y.o. female.    Chief Complaint: No chief complaint on file.    HPI:   Ms. Liu is a 78 y.o. female who is here for a follow-up visit for evaluation a nontoxic multinodular goiter.   Taking care of her  who has parkinson's disease.     In 2011 during a routine exam PCP noted thyromegaly and confirmed with an Ultrasound. Ultrasound of the thyroid indicated an isthmic nodule, measuring 3.5X 2.7X2CM nodule. The nodule was aspirated showing moderate number of innocuous appearing follicular clusters, some lymphocytes and little colloid. The aspiration was discussed with Dr. Rodas, plan was to repeat FNA and ultrasound.      Today, she denies difficulty swallowing, pain or pressure at the anterior neck. Denies hypo or hyperthyroidism or radiation exposure to the head or neck. She denies family history of thyroid cancer. Her maternal grandmother had a goiter and the thyroid was surgically removed. She denies personal history of breast or colon cancer.       8/2015: aortic valve replacement     Diagnosed with type 2 DM nine years ago, with A1C of 6.5%, had prediabetes for several years prior (6.3%). Current A1C is 7.4% on metformin 500 mg one tablet daily.       Review of Systems     No recent illness.   Denies tremulousness, palpitations or changes to weight.   Normal appetite, normal bowel movements.   Lower extremity swelling, intermittently, usually with prolonged standing.   Denies chest pain, pressure or shortness of breath  Denies tingling, occasional burning of her feet, associated with prolonged standing.     Objective:     Physical Exam   Constitutional: She is oriented to person, place, and time. She appears well-developed and well-nourished. No distress.   Eyes: Conjunctivae and EOM are normal. Pupils are equal, round, and reactive to light. No scleral icterus.   No proptosis.    Neck: Normal range of motion. Neck supple.   Midline nodule 3 cm in size,  "moves with deglutition  Negative marie's sign  Not tender to palpation.    Cardiovascular: Normal rate and intact distal pulses.   Pulmonary/Chest: Effort normal and breath sounds normal.   Neurological: She is alert and oriented to person, place, and time. She has normal reflexes.   No tremor.   Skin: Skin is warm and dry.   Psychiatric: She has a normal mood and affect.       Vitals:    11/13/18 1324   BP: 132/62   Pulse: 72   Weight: 82.3 kg (181 lb 6.4 oz)   Height: 5' 6" (1.676 m)     Results for ROBERTO ASKEW (MRN [id number]) as of 11/13/2018 13:52   Ref. Range 5/8/2018 08:59   Microalbum.,U,Random Latest Units: ug/mL 68.0   Creatinine, Random Ur Latest Ref Range: 15.0 - 325.0 mg/dL 127.0   Microalb Creat Ratio Latest Ref Range: 0.0 - 30.0 ug/mg 53.5 (H)     FNA 2012 SPECIMEN  1) FNA Thyroid (Clinician)  FINAL PATHOLOGIC DIAGNOSIS  [facility] CYTOPATHOLOGY CATEGORY:  ATYPICAL CELLULAR LESION.  COMMENT:  A few follicular epithelial cells show nuclear elongation and grooves. Close follow up is suggested.    FNA 2014 SPECIMEN  1) FNA Thyroid (Clinician)  FINAL PATHOLOGIC DIAGNOSIS  [facility] Thyroid Cytology Category: Benign  Benign follicular epithelial cells.    FNA 2015 SPECIMEN  1) FNA ISTHMUS Thyroid (Clinician)  2) FNA RIGHT Thyroid (Clinician)  FINAL PATHOLOGIC DIAGNOSIS  1. THYROID, ISTHMUS (ASPIRATION):  [facility] Thyroid Cytology Category: Benign  2. THYROID, RIGHT (ASPIRATION):  [facility] Thyroid Cytology Category: Benign    FNA 2016 SPECIMEN  1) FNA ISTHMUS (Clinician)  FINAL PATHOLOGIC DIAGNOSIS  THYROID, ISTHMUS (ASPIRATION):  [facility] Thyroid Cytology Category: Benign  [place] follicular cells and background colloid      Results for ROBERTO SAKEW (MRN [id number]) as of 11/13/2018 13:52   Ref. Range 6/7/2018 08:13   Hemoglobin A1C Latest Ref Range: 4.0 - 5.6 % 7.4 (H)   Estimated Avg Glucose Latest Ref Range: 68 - 131 mg/dL 166 (H)   TSH Latest Ref Range: 0.400 - " 4.000 uIU/mL 3.094       Assessment/Plan:       1. Nontoxic multinodular goiter    - US Soft Tissue Head Neck Thyroid; Future    2. Type 2 diabetes mellitus with microalbuminuria, without long-term current use of insulin    - The patient is asked to make an attempt to improve diet and exercise patterns to aid in medical management of this problem.  - looking for recumbent bike  - currently on metformin, reviewed eGFR could go to twice daily or add tradjenta

## 2018-12-04 ENCOUNTER — HOSPITAL ENCOUNTER (OUTPATIENT)
Dept: RADIOLOGY | Facility: HOSPITAL | Age: 78
Discharge: HOME | End: 2018-12-04
Attending: INTERNAL MEDICINE
Payer: MEDICARE

## 2018-12-04 DIAGNOSIS — E04.2 NONTOXIC MULTINODULAR GOITER: ICD-10-CM

## 2018-12-04 PROCEDURE — 76536 US EXAM OF HEAD AND NECK: CPT | Mod: 26,HCNC,, | Performed by: RADIOLOGY

## 2018-12-04 PROCEDURE — 76536 US EXAM OF HEAD AND NECK: CPT | Mod: TC,HCNC

## 2018-12-16 NOTE — PROGRESS NOTES
CC: 79 yo female presents in f/u to chronic DM w/ neuro/circ/neuro, HTN, CKD 3, HLD, and anemia    DMw/ neuro/renal, tx w/ metformin 500 mg.  A1c ==>pending ( was 7.4 , 6/2018)  Denied increased thirst or urination.  Denied diarrhea with the metformin  CKD 3, denied change fx    HLD, tx atorvastatin 40mg ( taking ???) and fenofibrate  Carotid AA ds, B/L  aortic atherosclerosis  Denied angina or mm pain ( sore from rehab today)  LDL==>142.6 ( 6/2018)      Hypertension, tx w/----- lisinopril 10 and HCTZ 50mg, and metoprolol 25mg  Dr. Gray added amlodipine 10mg  Denied headache or dizziness.  BP==>118/62      MedCard reviewed.  REVIEW OF SYSTEMS:  CONSTITUTIONAL: No fever, chill or night sweats.  EYES: Left blurry vision occ, lasts a few seconds  And right eye watery- to see her Ophth in January  CARDIOVASCULAR: No chest pain or palpitations.  RESPIRATORY: Not coughing or wheezing.  No PND or orthopnea  MUSCULOSKELETAL: Back, knees, intermittent   GASTROINTESTINAL: No change in bowel or bladder function.  Remainder of review negative except as previously noted.    PAST MEDICAL HISTORY:  1. 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  2. Aortic stenosis, severe. S/p AVR  3. Diastolic dysfunction.  4. Mild MR, mild TR.  5. Sciatica with abnormal MRI.  6. Hypertension, on amlodipine 10mg and metoprolol 25mg  And HCTZ 25mg  7. Dyslipidemia on atorvastatin 40mg, omega 3 and fenofibrate.  8. Osteoporosis, last DEXA- Normal.  9. 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 DVT postoperatively.  11. Lower extremity edema.  12. Hemorrhoids.      PHYSICAL EXAMINATION:  VS: stable  GENERAL: Alert and oriented, in no acute distress. Well developed, well nourished, conversant and cooperative.  Pleasant as always  EYES: Conjunctivae and lids unremarkable. Sclerae anicteric. Pupils reactive.  ENT: nasal mucosa /turbinates unremarkable  O/p injected w/o exudate  NECK: Supple. + thyromegaly No  lymphadenopathy noted.  RESPIRATORY: Efforts unlabored. Lungs are clear to auscultation.  HEART: Regular rate and rhythm.   1+ pedal pulse, no edema.  MUSCULOSKELETAL: Gait normal.   No clubbing, cyanosis or edema.  NEURO: VIEIRA. No tremor noted  SKIN: Feet warm, and dry, w/o lesions  Monofilament intact, decreased vibratory  Thickened toenails,left great toenail thickened w/ nail gel polish covering heel callous    IMPRESSION:  DM w/ neuopathy, stable  HLD,not @ goal  Carotid ds  Aortic atherosclerosis  HTN, stable  GERD, asx  Insomnia,     PLAN:  Fasting lab;  Continue present meds  Diabetic diet  Low fat diet  Continue present meds  Rx update  Call prn  RTC 6mos

## 2018-12-18 ENCOUNTER — OFFICE VISIT (OUTPATIENT)
Dept: INTERNAL MEDICINE | Facility: CLINIC | Age: 78
End: 2018-12-18
Payer: MEDICARE

## 2018-12-18 VITALS
RESPIRATION RATE: 18 BRPM | DIASTOLIC BLOOD PRESSURE: 62 MMHG | OXYGEN SATURATION: 98 % | WEIGHT: 179.88 LBS | HEIGHT: 66 IN | BODY MASS INDEX: 28.91 KG/M2 | SYSTOLIC BLOOD PRESSURE: 118 MMHG | TEMPERATURE: 99 F | HEART RATE: 58 BPM

## 2018-12-18 DIAGNOSIS — R80.9 TYPE 2 DIABETES MELLITUS WITH MICROALBUMINURIA, WITHOUT LONG-TERM CURRENT USE OF INSULIN: Primary | ICD-10-CM

## 2018-12-18 DIAGNOSIS — E11.29 TYPE 2 DIABETES MELLITUS WITH MICROALBUMINURIA, WITHOUT LONG-TERM CURRENT USE OF INSULIN: Primary | ICD-10-CM

## 2018-12-18 DIAGNOSIS — I77.9 BILATERAL CAROTID ARTERY DISEASE, UNSPECIFIED TYPE: ICD-10-CM

## 2018-12-18 DIAGNOSIS — N18.30 CHRONIC KIDNEY DISEASE, STAGE III (MODERATE): ICD-10-CM

## 2018-12-18 DIAGNOSIS — I10 ESSENTIAL HYPERTENSION: ICD-10-CM

## 2018-12-18 DIAGNOSIS — E78.2 MIXED HYPERLIPIDEMIA: Chronic | ICD-10-CM

## 2018-12-18 DIAGNOSIS — I70.0 AORTIC ATHEROSCLEROSIS: ICD-10-CM

## 2018-12-18 PROCEDURE — 99214 OFFICE O/P EST MOD 30 MIN: CPT | Mod: HCNC,S$GLB,, | Performed by: INTERNAL MEDICINE

## 2018-12-18 PROCEDURE — 99999 PR PBB SHADOW E&M-EST. PATIENT-LVL III: CPT | Mod: PBBFAC,HCNC,, | Performed by: INTERNAL MEDICINE

## 2018-12-18 PROCEDURE — 1101F PT FALLS ASSESS-DOCD LE1/YR: CPT | Mod: CPTII,HCNC,S$GLB, | Performed by: INTERNAL MEDICINE

## 2018-12-18 PROCEDURE — 3074F SYST BP LT 130 MM HG: CPT | Mod: CPTII,HCNC,S$GLB, | Performed by: INTERNAL MEDICINE

## 2018-12-18 PROCEDURE — 3078F DIAST BP <80 MM HG: CPT | Mod: CPTII,HCNC,S$GLB, | Performed by: INTERNAL MEDICINE

## 2018-12-19 ENCOUNTER — LAB VISIT (OUTPATIENT)
Dept: LAB | Facility: HOSPITAL | Age: 78
End: 2018-12-19
Attending: INTERNAL MEDICINE
Payer: MEDICARE

## 2018-12-19 DIAGNOSIS — R80.9 TYPE 2 DIABETES MELLITUS WITH MICROALBUMINURIA, WITHOUT LONG-TERM CURRENT USE OF INSULIN: ICD-10-CM

## 2018-12-19 DIAGNOSIS — E78.2 MIXED HYPERLIPIDEMIA: Chronic | ICD-10-CM

## 2018-12-19 DIAGNOSIS — E11.29 TYPE 2 DIABETES MELLITUS WITH MICROALBUMINURIA, WITHOUT LONG-TERM CURRENT USE OF INSULIN: ICD-10-CM

## 2018-12-19 LAB
ALBUMIN SERPL BCP-MCNC: 4 G/DL
ALP SERPL-CCNC: 43 U/L
ALT SERPL W/O P-5'-P-CCNC: 27 U/L
ANION GAP SERPL CALC-SCNC: 9 MMOL/L
AST SERPL-CCNC: 23 U/L
BILIRUB SERPL-MCNC: 0.4 MG/DL
BUN SERPL-MCNC: 21 MG/DL
CALCIUM SERPL-MCNC: 9.8 MG/DL
CHLORIDE SERPL-SCNC: 106 MMOL/L
CHOLEST SERPL-MCNC: 220 MG/DL
CHOLEST/HDLC SERPL: 4.6 {RATIO}
CO2 SERPL-SCNC: 26 MMOL/L
CREAT SERPL-MCNC: 1.2 MG/DL
EST. GFR  (AFRICAN AMERICAN): 50.4 ML/MIN/1.73 M^2
EST. GFR  (NON AFRICAN AMERICAN): 43.7 ML/MIN/1.73 M^2
ESTIMATED AVG GLUCOSE: 148 MG/DL
GLUCOSE SERPL-MCNC: 147 MG/DL
HBA1C MFR BLD HPLC: 6.8 %
HDLC SERPL-MCNC: 48 MG/DL
HDLC SERPL: 21.8 %
LDLC SERPL CALC-MCNC: 129.6 MG/DL
NONHDLC SERPL-MCNC: 172 MG/DL
POTASSIUM SERPL-SCNC: 4.4 MMOL/L
PROT SERPL-MCNC: 7 G/DL
SODIUM SERPL-SCNC: 141 MMOL/L
TRIGL SERPL-MCNC: 212 MG/DL

## 2018-12-19 PROCEDURE — 80061 LIPID PANEL: CPT | Mod: HCNC

## 2018-12-19 PROCEDURE — 36415 COLL VENOUS BLD VENIPUNCTURE: CPT | Mod: HCNC,PO

## 2018-12-19 PROCEDURE — 80053 COMPREHEN METABOLIC PANEL: CPT | Mod: HCNC

## 2018-12-19 PROCEDURE — 83036 HEMOGLOBIN GLYCOSYLATED A1C: CPT | Mod: HCNC

## 2018-12-21 ENCOUNTER — TELEPHONE (OUTPATIENT)
Dept: INTERNAL MEDICINE | Facility: CLINIC | Age: 78
End: 2018-12-21

## 2018-12-21 NOTE — TELEPHONE ENCOUNTER
----- Message from Karina Wilson MD sent at 12/20/2018  7:29 PM CST -----  Please note that your A1C has improved- with your average blood sugar down from 166 to 148  Your chemistries are stable, your kidney function is decreased but stable,   and avoidance of NSAIDS like Motrin, Aleve, Advil and ibuprofen and vigorous hydration with water is appropriate   it is okay to take Tylenol for pain or fever  Cholesterol is 220, with improvement of your bad ( LDL) level from 142.6 to 129.6  Please do not hesitate to call/email if you have any questions or concerns  Karina Cabrales

## 2018-12-31 RX ORDER — METOPROLOL TARTRATE 25 MG/1
TABLET, FILM COATED ORAL
Qty: 180 TABLET | Refills: 3 | Status: SHIPPED | OUTPATIENT
Start: 2018-12-31 | End: 2019-12-07 | Stop reason: SDUPTHER

## 2019-01-07 ENCOUNTER — TELEPHONE (OUTPATIENT)
Dept: ADMINISTRATIVE | Facility: HOSPITAL | Age: 79
End: 2019-01-07

## 2019-01-30 RX ORDER — METFORMIN HYDROCHLORIDE 500 MG/1
TABLET ORAL
Qty: 90 TABLET | Refills: 2 | Status: SHIPPED | OUTPATIENT
Start: 2019-01-30 | End: 2019-10-23 | Stop reason: SDUPTHER

## 2019-02-01 DIAGNOSIS — N18.9 CHRONIC KIDNEY DISEASE, UNSPECIFIED CKD STAGE: ICD-10-CM

## 2019-02-19 ENCOUNTER — TELEPHONE (OUTPATIENT)
Dept: INTERNAL MEDICINE | Facility: CLINIC | Age: 79
End: 2019-02-19

## 2019-02-19 DIAGNOSIS — Z00.00 ANNUAL PHYSICAL EXAM: Primary | ICD-10-CM

## 2019-02-19 DIAGNOSIS — I10 ESSENTIAL HYPERTENSION: ICD-10-CM

## 2019-02-19 DIAGNOSIS — E11.29 TYPE 2 DIABETES MELLITUS WITH MICROALBUMINURIA, WITHOUT LONG-TERM CURRENT USE OF INSULIN: ICD-10-CM

## 2019-02-19 DIAGNOSIS — E78.2 MIXED HYPERLIPIDEMIA: Chronic | ICD-10-CM

## 2019-02-19 DIAGNOSIS — R80.9 TYPE 2 DIABETES MELLITUS WITH MICROALBUMINURIA, WITHOUT LONG-TERM CURRENT USE OF INSULIN: ICD-10-CM

## 2019-02-19 NOTE — TELEPHONE ENCOUNTER
----- Message from Bobbi Mejía sent at 2/19/2019  3:31 PM CST -----  Contact: fyi  Doctor appointment and lab have been scheduled.  Please link lab orders to the lab appointment.  Date of doctor appointment:  06/25/19  Physical or EP:  epp  Date of lab appointment:  06/18/19  Comments:

## 2019-03-01 RX ORDER — FENOFIBRATE 134 MG/1
CAPSULE ORAL
Qty: 90 CAPSULE | Refills: 3 | Status: SHIPPED | OUTPATIENT
Start: 2019-03-01 | End: 2020-03-03

## 2019-04-16 ENCOUNTER — TELEPHONE (OUTPATIENT)
Dept: INTERNAL MEDICINE | Facility: CLINIC | Age: 79
End: 2019-04-16

## 2019-04-16 DIAGNOSIS — Z12.31 VISIT FOR SCREENING MAMMOGRAM: Primary | ICD-10-CM

## 2019-04-16 NOTE — TELEPHONE ENCOUNTER
----- Message from Monserrat Dye sent at 4/16/2019  8:57 AM CDT -----  Contact: self/[phone]  Type: Orders Request    What orders/ testing are being requested? Mammogram Orders     Is there a future appointment scheduled for the patient with PCP? Yes     When? 6/25/19    Would you prefer a response via REES46? No     Comments: Please call and advise.          Thank You

## 2019-04-17 ENCOUNTER — OFFICE VISIT (OUTPATIENT)
Dept: INTERNAL MEDICINE | Facility: CLINIC | Age: 79
End: 2019-04-17
Payer: MEDICARE

## 2019-04-17 ENCOUNTER — OFFICE VISIT (OUTPATIENT)
Dept: FAMILY MEDICINE | Facility: CLINIC | Age: 79
End: 2019-04-17
Payer: MEDICARE

## 2019-04-17 VITALS
SYSTOLIC BLOOD PRESSURE: 142 MMHG | OXYGEN SATURATION: 98 % | BODY MASS INDEX: 29.09 KG/M2 | WEIGHT: 181 LBS | DIASTOLIC BLOOD PRESSURE: 60 MMHG | TEMPERATURE: 98 F | HEART RATE: 67 BPM | HEIGHT: 66 IN

## 2019-04-17 VITALS
HEART RATE: 67 BPM | DIASTOLIC BLOOD PRESSURE: 62 MMHG | HEIGHT: 66 IN | BODY MASS INDEX: 28.12 KG/M2 | RESPIRATION RATE: 15 BRPM | WEIGHT: 175 LBS | SYSTOLIC BLOOD PRESSURE: 102 MMHG

## 2019-04-17 DIAGNOSIS — E11.59 HYPERTENSION ASSOCIATED WITH DIABETES: ICD-10-CM

## 2019-04-17 DIAGNOSIS — I77.9 BILATERAL CAROTID ARTERY DISEASE, UNSPECIFIED TYPE: ICD-10-CM

## 2019-04-17 DIAGNOSIS — I35.0 AORTIC VALVE STENOSIS, ETIOLOGY OF CARDIAC VALVE DISEASE UNSPECIFIED: ICD-10-CM

## 2019-04-17 DIAGNOSIS — E78.5 HYPERLIPIDEMIA ASSOCIATED WITH TYPE 2 DIABETES MELLITUS: ICD-10-CM

## 2019-04-17 DIAGNOSIS — I70.0 AORTIC ATHEROSCLEROSIS: ICD-10-CM

## 2019-04-17 DIAGNOSIS — M85.80 OSTEOPENIA, UNSPECIFIED LOCATION: ICD-10-CM

## 2019-04-17 DIAGNOSIS — E11.69 HYPERLIPIDEMIA ASSOCIATED WITH TYPE 2 DIABETES MELLITUS: ICD-10-CM

## 2019-04-17 DIAGNOSIS — E11.69 OBESITY, DIABETES, AND HYPERTENSION SYNDROME: ICD-10-CM

## 2019-04-17 DIAGNOSIS — E66.3 OVERWEIGHT (BMI 25.0-29.9): ICD-10-CM

## 2019-04-17 DIAGNOSIS — E11.59 OBESITY, DIABETES, AND HYPERTENSION SYNDROME: ICD-10-CM

## 2019-04-17 DIAGNOSIS — I15.2 HYPERTENSION ASSOCIATED WITH DIABETES: ICD-10-CM

## 2019-04-17 DIAGNOSIS — E04.2 NONTOXIC MULTINODULAR GOITER: ICD-10-CM

## 2019-04-17 DIAGNOSIS — N18.30 CKD STAGE 3 SECONDARY TO DIABETES: ICD-10-CM

## 2019-04-17 DIAGNOSIS — H93.19 TINNITUS, UNSPECIFIED LATERALITY: ICD-10-CM

## 2019-04-17 DIAGNOSIS — I51.89 LEFT VENTRICULAR DIASTOLIC DYSFUNCTION WITH PRESERVED SYSTOLIC FUNCTION: ICD-10-CM

## 2019-04-17 DIAGNOSIS — E66.9 OBESITY, DIABETES, AND HYPERTENSION SYNDROME: ICD-10-CM

## 2019-04-17 DIAGNOSIS — E11.22 CKD STAGE 3 SECONDARY TO DIABETES: ICD-10-CM

## 2019-04-17 DIAGNOSIS — R80.9 TYPE 2 DIABETES MELLITUS WITH MICROALBUMINURIA, WITHOUT LONG-TERM CURRENT USE OF INSULIN: ICD-10-CM

## 2019-04-17 DIAGNOSIS — S46.912A MUSCLE STRAIN OF LEFT SHOULDER, INITIAL ENCOUNTER: Primary | ICD-10-CM

## 2019-04-17 DIAGNOSIS — E11.29 TYPE 2 DIABETES MELLITUS WITH MICROALBUMINURIA, WITHOUT LONG-TERM CURRENT USE OF INSULIN: ICD-10-CM

## 2019-04-17 DIAGNOSIS — I15.2 OBESITY, DIABETES, AND HYPERTENSION SYNDROME: ICD-10-CM

## 2019-04-17 DIAGNOSIS — Z00.00 ENCOUNTER FOR PREVENTIVE HEALTH EXAMINATION: Primary | ICD-10-CM

## 2019-04-17 DIAGNOSIS — H91.93 DECREASED HEARING OF BOTH EARS: ICD-10-CM

## 2019-04-17 DIAGNOSIS — E11.9 DM TYPE 2 WITHOUT RETINOPATHY: ICD-10-CM

## 2019-04-17 DIAGNOSIS — K21.9 GASTROESOPHAGEAL REFLUX DISEASE WITHOUT ESOPHAGITIS: ICD-10-CM

## 2019-04-17 DIAGNOSIS — Z95.2 S/P AVR (AORTIC VALVE REPLACEMENT): ICD-10-CM

## 2019-04-17 PROCEDURE — 99214 PR OFFICE/OUTPT VISIT, EST, LEVL IV, 30-39 MIN: ICD-10-PCS | Mod: HCNC,S$GLB,, | Performed by: NURSE PRACTITIONER

## 2019-04-17 PROCEDURE — 3078F DIAST BP <80 MM HG: CPT | Mod: HCNC,CPTII,S$GLB, | Performed by: NURSE PRACTITIONER

## 2019-04-17 PROCEDURE — 3074F PR MOST RECENT SYSTOLIC BLOOD PRESSURE < 130 MM HG: ICD-10-PCS | Mod: HCNC,CPTII,S$GLB, | Performed by: NURSE PRACTITIONER

## 2019-04-17 PROCEDURE — G0439 PR MEDICARE ANNUAL WELLNESS SUBSEQUENT VISIT: ICD-10-PCS | Mod: HCNC,S$GLB,, | Performed by: NURSE PRACTITIONER

## 2019-04-17 PROCEDURE — 99999 PR PBB SHADOW E&M-EST. PATIENT-LVL V: CPT | Mod: PBBFAC,HCNC,, | Performed by: NURSE PRACTITIONER

## 2019-04-17 PROCEDURE — 3074F SYST BP LT 130 MM HG: CPT | Mod: HCNC,CPTII,S$GLB, | Performed by: NURSE PRACTITIONER

## 2019-04-17 PROCEDURE — 99214 OFFICE O/P EST MOD 30 MIN: CPT | Mod: HCNC,S$GLB,, | Performed by: NURSE PRACTITIONER

## 2019-04-17 PROCEDURE — 99499 UNLISTED E&M SERVICE: CPT | Mod: HCNC,S$GLB,, | Performed by: NURSE PRACTITIONER

## 2019-04-17 PROCEDURE — G0439 PPPS, SUBSEQ VISIT: HCPCS | Mod: HCNC,S$GLB,, | Performed by: NURSE PRACTITIONER

## 2019-04-17 PROCEDURE — 99999 PR PBB SHADOW E&M-EST. PATIENT-LVL IV: ICD-10-PCS | Mod: PBBFAC,HCNC,, | Performed by: NURSE PRACTITIONER

## 2019-04-17 PROCEDURE — 3078F PR MOST RECENT DIASTOLIC BLOOD PRESSURE < 80 MM HG: ICD-10-PCS | Mod: HCNC,CPTII,S$GLB, | Performed by: NURSE PRACTITIONER

## 2019-04-17 PROCEDURE — 99999 PR PBB SHADOW E&M-EST. PATIENT-LVL IV: CPT | Mod: PBBFAC,HCNC,, | Performed by: NURSE PRACTITIONER

## 2019-04-17 PROCEDURE — 99499 RISK ADDL DX/OHS AUDIT: ICD-10-PCS | Mod: HCNC,S$GLB,, | Performed by: NURSE PRACTITIONER

## 2019-04-17 PROCEDURE — 99999 PR PBB SHADOW E&M-EST. PATIENT-LVL V: ICD-10-PCS | Mod: PBBFAC,HCNC,, | Performed by: NURSE PRACTITIONER

## 2019-04-17 PROCEDURE — 1101F PT FALLS ASSESS-DOCD LE1/YR: CPT | Mod: HCNC,CPTII,S$GLB, | Performed by: NURSE PRACTITIONER

## 2019-04-17 PROCEDURE — 1101F PR PT FALLS ASSESS DOC 0-1 FALLS W/OUT INJ PAST YR: ICD-10-PCS | Mod: HCNC,CPTII,S$GLB, | Performed by: NURSE PRACTITIONER

## 2019-04-17 NOTE — PROGRESS NOTES
"Zaida Liu presented for a  Medicare AWV and comprehensive Health Risk Assessment today. The following components were reviewed and updated:    · Medical history  · Family History  · Social history  · Allergies and Current Medications  · Health Risk Assessment  · Health Maintenance  Care Team external [facility] [place]  ** See Completed Assessments for Annual Wellness Visit within the encounter summary.**       The following assessments were completed:  · Living Situation  · CAGE  · Depression Screening  · Timed Get Up and Go  · Whisper Test  · Cognitive Function Screening  · Nutrition Screening  · ADL Screening  · PAQ Screening    Vitals:    04/17/19 1036   BP: 102/62   Pulse: 67   Resp: 15   Weight: 79.4 kg (175 lb)   Height: 5' 6" (1.676 m)     Body mass index is 28.25 kg/m².  Physical Exam   Constitutional: She is oriented to person, place, and time. She appears well-developed and well-nourished.   HENT:   Head: Normocephalic and atraumatic.   Right Ear: External ear normal.   Cardiovascular: Normal rate, regular rhythm and normal heart sounds.   No murmur heard.  Pulmonary/Chest: Effort normal and breath sounds normal. No respiratory distress. She has no decreased breath sounds. She has no wheezes. She has no rhonchi. She has no rales.   Abdominal: Soft. Bowel sounds are normal.   obese   Musculoskeletal: Normal range of motion. She exhibits no edema.   Neurological: She is alert and oriented to person, place, and time. She exhibits normal muscle tone.   Skin: Skin is warm and dry. She is not diaphoretic. No pallor.   Psychiatric: She has a normal mood and affect. Her speech is normal and behavior is normal. Judgment and thought content normal.   Nursing note and vitals reviewed.        Diagnoses and health risks identified today and associated recommendations/orders:    1. Nontoxic multinodular goiter  Stable- followed by endocrinology    2. Aortic valve stenosis, etiology of cardiac valve disease " unspecified  Stable- followed by cardiology    3. S/P AVR (aortic valve replacement)  Stable- followed by cardiology    4. Hypertension associated with diabetes  Stable- followed by cardiology    5. Left ventricular diastolic dysfunction with preserved systolic function  Stable- followed by cardiology    6. Aortic atherosclerosis  Stable- followed by cardiology    7. Osteopenia, unspecified location  Stable- followed by PCP    8. Bilateral carotid artery disease, unspecified type  Stable- followed by cardiology    9. Gastroesophageal reflux disease without esophagitis  Stable- followed by PCP    10. Type 2 diabetes mellitus with microalbuminuria, without long-term current use of insulin  Stable- followed by PCP    11. Hyperlipidemia associated with type 2 diabetes mellitus  Stable- followed by cardiology    12. CKD stage 3 secondary to diabetes  Stable- followed by PCP    13. Obesity, diabetes, and hypertension syndrome  Stable- followed by PCP    14. Overweight (BMI 25.0-29.9)  Chronic  no recent weight loss. Followed by PCP.  Reviewed with patient the Centers for Disease Control and Prevention (CDC)  weight recommendations for current BMI & ideal BMI range.   Diabetic, Low fat diet and regular exercise regimen encouraged.  CDC handout with recommended goal weight range given to patient.     15. DM type 2 without retinopathy  Stable- followed by external opth clinic    16. Decreased hearing of both ears  Stable- followed by PCP  - Ambulatory Referral to Audiology    17. Tinnitus, unspecified laterality  Stable- followed by PCP  - Ambulatory Referral to Audiology    18. Encounter for preventive health examination  Assessments completed. Preventative health recommendations reviewed.         Provided Zaida with a 5-10 year written screening schedule and personal prevention plan. Recommendations were developed using the USPSTF age appropriate recommendations. Education, counseling, and referrals were provided as  needed. After Visit Summary printed and given to patient which includes a list of additional screenings\tests needed. Colonoscopy- plans to speak w PCP    Follow up in about 1 year (around 4/17/2020) for HRA.    Vane Rogers NP I offered to discuss end of life issues, including information on how to make advance directives that the patient could use to name someone who would make medical decisions on their behalf if they became too ill to make themselves.    ___Patient declined  _X_Patient is interested, I provided paper work and offered to discuss.

## 2019-04-17 NOTE — Clinical Note
Primary Care Providers:Karina Wilson MD, MD (General)Your patient was seen today for a HRA visit. Gap(s) in care (HEDIS gaps) have been identified during this visit that require additional testing and possible follow up.Orders Placed This Encounter    Ambulatory Referral to Audiology        Referral Priority:Routine        Referral Type:Audiology Exam        Referral Reason:Specialty Services Required        Requested Specialty:Audiology        Number of Visits Requested:1Colonoscopy- she wants you to decide if she needs- can u order?thanksThese orders were placed using [facility] approved protocol and any results will be forwarded to your office for appropriate follow up. I have included a copy of my visit note; please review the note and feel free to contact me with any questions. Thank you for allowing me to participate in the care of your patients.Vane Rogers NP

## 2019-04-17 NOTE — PROGRESS NOTES
History of Present Illness   Zaida Liu is a 79 y.o. woman with medical history as listed below who presents today for evaluation of left shoulder/arm pain. She reports that her symptoms began after doing some heavy lifting working in the backyard. The pain is located along the lateral aspect and radiates down to mid arm. The pain is not present at worse, but the area is tender to touch. Pain is present with elevation of arm >90 degrees. She denies numbness, tingling,or focal weakness of extremity. Pain does not radiates to neck, chest, or back. She was seen today for annual wellness visit and was instructed to follow-up to rule out cardiac etiology. She denies palpitations, cardiac chest pain, shortness of breath, dizziness, or LOC. She has not tried OTC medications. She has no additional complaints and is otherwise healthy on today's visit.    Past Medical History:   Diagnosis Date    Aortic stenosis     moderate to severe    Arthritis     Back pain     Diabetes mellitus with neuropathy     Disorder of kidney and ureter     DVT (deep venous thrombosis)     postoperatively    Edema of both legs     Hemorrhoids     History of DVT of lower extremity     s/p surgery    History of hemorrhoids     Hyperlipidemia     Hypertension     OP (osteoporosis)     Positive PPD, treated     PPD positive, treated     hosp x 1 yr, treated x 5 yrs- child    Sciatica     Stenosis     Stenosis of aortic and mitral valves     Thyroid disease     Type 2 diabetes mellitus with circulatory disorder 10/14/2016    Urinary incontinence        Past Surgical History:   Procedure Laterality Date    AORTIC VALVE REPLACEMENT  8/25/2015    Aortic valve replacement with a 21-mm [employer] pericardial valve tissue via upper dominguez-sternotomy.    BREAST SURGERY      breast reduction    CHOLECYSTECTOMY      HEART CATH-LEFT N/A 7/20/2015    Performed by Cortes Simmons MD at [facility] CATH LAB    REPLACEMENT-VALVE-AORTIC/minimally  invasive N/A 2015    Performed by Ben Winn MD at [facility] OR [facility] F[facility]    TONSILLECTOMY      TOTAL REDUCTION MAMMOPLASTY      TUBAL LIGATION         Social History     Socioeconomic History    Marital status:      Spouse name: Not on file    Number of children: Not on file    Years of education: Not on file    Highest education level: Not on file   Occupational History    Not on file   Social Needs    Financial resource strain: Not on file    Food insecurity:     Worry: Not on file     Inability: Not on file    Transportation needs:     Medical: Not on file     Non-medical: Not on file   Tobacco Use    Smoking status: Former Smoker     Last attempt to quit: 10/14/1966     Years since quittin.5    Smokeless tobacco: Never Used   Substance and Sexual Activity    Alcohol use: Yes     Alcohol/week: 1.2 oz     Types: 2 Glasses of wine per week     Comment: 1-2 glasses on wine on weekend    Drug use: No    Sexual activity: Never     Partners: Male   Lifestyle    Physical activity:     Days per week: Not on file     Minutes per session: Not on file    Stress: Not on file   Relationships    Social connections:     Talks on phone: Not on file     Gets together: Not on file     Attends [employer] service: Not on file     Active member of club or organization: Not on file     Attends meetings of clubs or organizations: Not on file     Relationship status: Not on file   Other Topics Concern    Not on file   Social History Narrative          Spouse retired, first spouse  when he was 38.          3 children,  2 sons, and daughter,      son with hypertension.  No heart disease as of yet.      Her spouse has hypertension, AFib and memory      issues, but apparently since he has retired they are doing better.           FHX:    Bro 82, d. massive MI, + DM,s/p amputation LE limb    Sis , new dx cerebrovascular ds, s/p stent x2 in cerebral carotid                 Family History   Problem  "Relation Age of Onset    Breast cancer Cousin     Cancer Cousin         breast    Breast cancer Other     Cancer Other         breast    Colon cancer Other     Heart disease Mother         CHF    Heart failure Mother         d. 85    Hypertension Mother     Heart disease Father         d. 61    Alcohol abuse Father     Stroke Father     Hypertension Father     Lymphoma Sister         on immunosuppresant    Rheum arthritis Sister         d.80    Cancer Sister         lymphoma    Heart disease Sister     Stroke Sister         mild    Heart disease Brother         d. 82    Colon cancer Sister         d. 81    Cancer Sister         colon cancer    Heart disease Brother         d. 60    Diabetes Brother     No Known Problems Daughter     No Known Problems Son     Hypertension Son     Heart attack Neg Hx     Hyperlipidemia Neg Hx        Review of Systems  Review of Systems   Respiratory: Negative for shortness of breath.    Cardiovascular: Negative for chest pain and palpitations.   Musculoskeletal: Positive for joint pain and myalgias. Negative for back pain, falls and neck pain.   Skin: Negative for rash.   Neurological: Negative for tingling, sensory change and focal weakness.     A complete review of systems was otherwise negative.    Physical Exam  BP (!) 142/60   Pulse 67   Temp 98.2 °F (36.8 °C) (Oral)   Ht 5' 6" (1.676 m)   Wt 82.1 kg (181 lb)   SpO2 98%   BMI 29.21 kg/m²   General appearance: alert, appears stated age, cooperative and no distress  Neck: no carotid bruit, no JVD, supple, symmetrical, trachea midline and FROM with no bony TTP  Back: symmetric, no curvature. ROM normal. No CVA tenderness.  Lungs: clear to auscultation bilaterally  Heart: regular rate and rhythm, S1, S2 normal, no murmur, click, rub or gallop  Extremities: extremities normal, atraumatic, no cyanosis or edema  Pulses: 2+ and symmetric  Skin: Skin color, texture, turgor normal. No rashes or " lesions  Neurologic: Grossly normal, no focal neurological deficits  Musculoskeletal: left shoulder exhibits TTP over bicep tendon with pain with elevation >90 degrees, distal sensation/ROM/pulses intact    Assessment/Plan  Muscle strain of left shoulder, initial encounter  Likely muscle strain of left shoulder that occurred with strenuous activity and heavy lifting. Recommend ice and heat application. Handout provided on stretches. She can take Tylenol as needed. RTC PRN.    CKD stage 3 secondary to diabetes  The current medical regimen is effective;  continue present plan and medications.  Avoid NSAID for pain.    Type 2 diabetes mellitus with microalbuminuria, without long-term current use of insulin  The current medical regimen is effective;  continue present plan and medications.    Hyperlipidemia associated with type 2 diabetes mellitus  The current medical regimen is effective;  continue present plan and medications.    Aortic valve stenosis, etiology of cardiac valve disease unspecified  The current medical regimen is effective;  continue present plan and medications.    Hypertension associated with diabetes  The current medical regimen is effective;  continue present plan and medications.    Left ventricular diastolic dysfunction with preserved systolic function  The current medical regimen is effective;  continue present plan and medications.    Aortic atherosclerosis  The current medical regimen is effective;  continue present plan and medications.    Bilateral carotid artery disease, unspecified type  The current medical regimen is effective;  continue present plan and medications.    Patient has verbalized understanding and is in agreement with plan of care.    Follow up if symptoms worsen or fail to improve.

## 2019-04-17 NOTE — PATIENT INSTRUCTIONS
Counseling and Referral of Other Preventative  (Italic type indicates deductible and co-insurance are waived)    Patient Name: Zaida Liu  Today's Date: 4/17/2019    Health Maintenance       Date Due Completion Date    Hemoglobin A1c 06/19/2019 12/19/2018    Override on 12/18/2018: Done    Foot Exam 12/18/2019 12/18/2018 (Done)    Override on 12/18/2018: Done    Lipid Panel 12/19/2019 12/19/2018    Eye Exam 01/04/2020 1/4/2019  External eye MD        DEXA SCAN 05/21/2021 5/21/2018    TETANUS VACCINE    Mammogram    Colonoscopy    Consider monitoring blood sugar at home  And maintaining diabetic diet   02/18/2024    Scheduled    Due  2/18/2014\        Last done 10/13/2011        Orders Placed This Encounter   Procedures    Ambulatory Referral to Audiology     The following information is provided to all patients.  This information is to help you find resources for any of the problems found today that may be affecting your health:                Living healthy guide: www.[facility].[place].[facility]      Understanding Diabetes: www.diabetes.org      Eating healthy: www.cdc.gov/healthyweight      [facility] home safety checklist: www.cdc.gov/steadi/patient.html      Agency on Aging: www.goea.[place].[facility]      Alcoholics anonymous (AA): www.aa.org      Physical Activity: www.juan.nih.gov/ek3vmtz      Tobacco use: www.quitwithusla.org     Exercises to Prevent Falls  Certain types of exercises may help make you less likely to fall. Try the ones below. Or do other exercises that your health care provider suggests. Depending on your health, you may need to start slowly. Don't let that stop you. Even small amounts of exercise can help you. Be sure to talk to your health care provider before starting any exercise program.       Improve balance  Many types of exercise can help improve balance. Nagi chi and yoga are good examples. Here's another one to try. You can do it anytime and almost anywhere.  · Stand next to a counter or  "solid support.  · Push yourself up onto your tiptoes.  · Hold for 5 seconds. If you start to lose your balance, hold on to the counter.  · Rest and repeat 5 times. Work up to holding for 20 to 30 seconds, if you can. Increase flexibility  Being more flexible makes it easier for you to move around safely. Try exercises like the seated hamstring stretch.  · Sit in a chair and put one foot on a stool.  · Straighten your leg and reach with both hands down either side of your leg. Reach as far down your leg as you can.  · Hold for about 20 seconds.  · Go back to the starting position. Then repeat 5 times. Switch legs. Build strength  "Resistance" exercises help build strength. You can do them without equipment. Or you can use weights, elastic bands, or special machines. One such exercise is called the biceps curl. You can hold a 1-pound weight or even a can of soup. Do this exercise at least 3 times a week. Strive for every day.  · Sit up straight in a chair.  · Keep your elbow close to your body and your wrist straight.  · Bend your arm, moving your hand up to your shoulder. Then slowly lower your arm.  · Repeat 5 times. Switch to the other arm.   Build your staying power  Aerobic exercises make your heart and lungs stronger so you can keep moving longer. Walking and swimming are two of the best types of exercises you can do. Using a stationary bike is great, too. Find an aerobic exercise that you enjoy. Start slowly and build up. Even 5 minutes is helpful. Aim for a goal of 30 minutes, at least 3 times a week. You don't have to do 30 minutes in 1 session. Break it up and walk a little throughout the day.  More helpful tips  · Start easy. Slowly work up to doing more.  · Talk with your health care provider about the best exercises for you.  · Call senior centers or health clubs about exercise programs.  · If needed, have a family member watch you walk every so often to check your stability.  · Exercise with a friend. " Choose an activity you both enjoy.  · Consider carlos chi or yoga to strengthen your balance.  · Try exercises that you can do anytime, anywhere. Here are 2 examples. Have someone with you when you first try these:  ¨ Practice walking by placing 1 foot right in front of the other.  ¨ Stand up and sit down 10 times. Repeat this throughout the day.   Date Last Reviewed: 6/13/2015  © 3535-7628 [employer]. [address] [place] [address], [place] [address]. All rights reserved. This information is not intended as a substitute for professional medical care. Always follow your healthcare professional's instructions.        Understanding Carbohydrates, Fats, and Protein  Food is a source of fuel and nourishment for your body. Its also a source of pleasure. Having diabetes doesnt mean you have to eat special foods or give up desserts. Instead, your dietitian can show you how to plan meals to suit your body. To start, learn how different foods affect blood sugar.  Carbohydrates  Carbohydrates are the main source of fuel for the body. Carbohydrates raise blood sugar. Many people think carbohydrates are only found in pasta or bread. But carbohydrates are actually in many kinds of foods:  · Sugars occur naturally in foods such as fruit, milk, honey, and molasses. Sugars can also be added to many foods, from cereals and yogurt to candy and desserts. Sugars raise blood sugar.  · Starches are found in bread, cereals, pasta, and dried beans. Theyre also found in corn, peas, potatoes, yam, acorn squash, and butternut squash. Starches also raise blood sugar.   · Fiber is found in foods such as vegetables, fruits, beans, and whole grains. Unlike other carbs, fiber isnt digested or absorbed. So it doesnt raise blood sugar. In fact, fiber can help keep blood sugar from rising too fast. It also helps keep blood cholesterol at a healthy level.  Did you know?  Even though carbohydrates raise blood sugar, its best to have some in  every meal. They are an important part of a healthy diet.   Fat  Fat is an energy source that can be stored until needed. Fat does not raise blood sugar. However, it can raise blood cholesterol, increasing the risk of heart disease. Fat is also high in calories, which can cause weight gain. Not all types of fat are the same.  More Healthy:  · Monounsaturated fats are mostly found in vegetable oils, such as olive, canola, and peanut oils. They are also found in avocados and some nuts. Monounsaturated fats are healthy for your heart. Thats because they lower LDL (unhealthy) cholesterol.  · Polyunsaturated fats are mostly found in vegetable oils, such as corn, safflower, and soybean oils. They are also found in some seeds, nuts, and fish. Polyunsaturated fats lower LDL (unhealthy) cholesterol. So, choosing them instead of saturated fats is healthy for your heart. Certain unsaturated fats can help lower triglycerides.   Less Healthy:  · Saturated fats are found in animal products, such as meat, poultry, whole milk, lard, and butter. Saturated fats raise LDL cholesterol and are not healthy for your heart.  · Hydrogenated oils and trans fats are formed when vegetable oils are processed into solid fats. They are found in many processed foods. Hydrogenated oils and trans fats raise LDL cholesterol and lower HDL (healthy) cholesterol. They are not healthy for your heart.  Protein  Protein helps the body build and repair muscle and other tissue. Protein has little or no effect on blood sugar. However, many foods that contain protein also contain saturated fat. By choosing low-fat protein sources, you can get the benefits of protein without the extra fat:  · Plant protein is found in dry beans and peas, nuts, and soy products, such as tofu and soymilk. These sources tend to be cholesterol-free and low in saturated fat.  · Animal protein is found in fish, poultry, meat, cheese, milk, and eggs. These contain cholesterol and can  be high in saturated fat. Aim for lean, lower-fat choices.  Date Last Reviewed: 3/1/2016  © 8511-0888 [employer]. [address] [place] [address], [place] [address]. All rights reserved. This information is not intended as a substitute for professional medical care. Always follow your healthcare professional's instructions.        Understanding Carbohydrates, Fats, and Protein  Food is a source of fuel and nourishment for your body. Its also a source of pleasure. Having diabetes doesnt mean you have to eat special foods or give up desserts. Instead, your dietitian can show you how to plan meals to suit your body. To start, learn how different foods affect blood sugar.  Carbohydrates  Carbohydrates are the main source of fuel for the body. Carbohydrates raise blood sugar. Many people think carbohydrates are only found in pasta or bread. But carbohydrates are actually in many kinds of foods:  · Sugars occur naturally in foods such as fruit, milk, honey, and molasses. Sugars can also be added to many foods, from cereals and yogurt to candy and desserts. Sugars raise blood sugar.  · Starches are found in bread, cereals, pasta, and dried beans. Theyre also found in corn, peas, potatoes, yam, acorn squash, and butternut squash. Starches also raise blood sugar.   · Fiber is found in foods such as vegetables, fruits, beans, and whole grains. Unlike other carbs, fiber isnt digested or absorbed. So it doesnt raise blood sugar. In fact, fiber can help keep blood sugar from rising too fast. It also helps keep blood cholesterol at a healthy level.  Did you know?  Even though carbohydrates raise blood sugar, its best to have some in every meal. They are an important part of a healthy diet.   Fat  Fat is an energy source that can be stored until needed. Fat does not raise blood sugar. However, it can raise blood cholesterol, increasing the risk of heart disease. Fat is also high in calories, which can cause weight  gain. Not all types of fat are the same.  More Healthy:  · Monounsaturated fats are mostly found in vegetable oils, such as olive, canola, and peanut oils. They are also found in avocados and some nuts. Monounsaturated fats are healthy for your heart. Thats because they lower LDL (unhealthy) cholesterol.  · Polyunsaturated fats are mostly found in vegetable oils, such as corn, safflower, and soybean oils. They are also found in some seeds, nuts, and fish. Polyunsaturated fats lower LDL (unhealthy) cholesterol. So, choosing them instead of saturated fats is healthy for your heart. Certain unsaturated fats can help lower triglycerides.   Less Healthy:  · Saturated fats are found in animal products, such as meat, poultry, whole milk, lard, and butter. Saturated fats raise LDL cholesterol and are not healthy for your heart.  · Hydrogenated oils and trans fats are formed when vegetable oils are processed into solid fats. They are found in many processed foods. Hydrogenated oils and trans fats raise LDL cholesterol and lower HDL (healthy) cholesterol. They are not healthy for your heart.  Protein  Protein helps the body build and repair muscle and other tissue. Protein has little or no effect on blood sugar. However, many foods that contain protein also contain saturated fat. By choosing low-fat protein sources, you can get the benefits of protein without the extra fat:  · Plant protein is found in dry beans and peas, nuts, and soy products, such as tofu and soymilk. These sources tend to be cholesterol-free and low in saturated fat.  · Animal protein is found in fish, poultry, meat, cheese, milk, and eggs. These contain cholesterol and can be high in saturated fat. Aim for lean, lower-fat choices.  Date Last Reviewed: 3/1/2016  © 8573-2369 [employer]. [address] [place] [address], [place] [address]. All rights reserved. This information is not intended as a substitute for professional medical care. Always  follow your healthcare professional's instructions.        Diabetes: Learning About Serving and Portion Sizes     A good rule of thumb: Devote half your plate to vegetables and green salad. Split the other half between protein and starchy carbohydrates. Fruit makes a good dessert.     Servings and portions. Whats the difference? These terms can be very confusing. But learning to measure serving sizes can help you figure out how many carbohydrates (carbs) and other foods you eat each day. They are also powerful tools for managing your weight.  Servings and portions  Many different words are used to describe amounts of food. If your health care provider uses a term youre not sure of, dont be afraid to ask. It helps to know the difference between servings and portions:  · A serving size is a fixed size. Food producers use this term to describe their products. For example, the label on a cereal box could say that 1 cup of dry cereal = 1 serving.  · A portion (also called a helping) is how much you eat or how much you put on your plate at a meal. For example, you might eat 2 cups of cereal at breakfast.  Using serving information  The portion you choose to eat (such as 2 cups of cereal) may be more than one serving as listed on the food label (such as 1 cup of cereal). Thats why it helps to measure or weigh the food you eat. Because the food label values are based on servings, youll need to know how many servings you eat at one sitting.     Ounces: 2 to 3 ounces is about the size of your palm.       1 Cup: 1 cup (or a medium-sized piece) is about the size of your fist.       1/2 Cup: 1/2 cup is about the size of your cupped hand.      One tablespoon is about the size of your thumb.  One teaspoon is about the size of the tip of your thumb.  Keeping track of serving sizes  When youre planning for a snack or a meal, keep servings in mind. If you dont have measuring cups or a scale handy, there are ways to  eyeball serving sizes, such as comparing your food to the size of your hand (see pictures above).  Managing portion sizes  If your weight is a concern, reducing your portions can help. You can eat more than one serving of a food at once. But to keep from eating too much at one meal, learn how to manage your portions. A portion is the amount of each type of food on your plate. See the plate diagram for an example of balanced portions.  Date Last Reviewed: 3/1/2016  © 6528-7719 [employer]. [address] [place] [address], [place] [address]. All rights reserved. This information is not intended as a substitute for professional medical care. Always follow your healthcare professional's instructions.        Understanding Carbohydrates    A car needs the right type of fuel to run. And you need the right kind of food to function. To keep your energy level up, your body needs food that has carbohydrates. But carbohydrates raise blood sugar levels higher and faster than other kinds of food. Your dietitian will work with you to figure out the amount of carbohydrates you need.  Starches  Starches are found in grains, some vegetables, and beans. Grain products include bread, pasta, cereal, and tortillas. Starchy vegetables include potatoes, peas, corn, lima beans, yams, and squash. Kidney beans, waldrop beans, black beans, garbanzo beans, and lentils also contain starches.  Sugars  Sugars are found naturally in many foods. Or sugar can be added. Foods that contain natural sugar include fruits and fruit juices, dairy products, honey, and molasses. Added sugars are found in most desserts, processed foods, candy, regular soda, and fruit drinks. These are very helpful for treating low blood sugar, or hypoglycemia. They provide sugar quickly. Try to keep at least 15 to 20 grams of these simple sugars with you at all times. Eat this if you begin to have low blood sugar symptoms.  Fiber  Fiber comes from plant foods. Most fiber  isnt digested by the body. Instead of raising blood sugar levels like other carbohydrates, it actually stops blood sugar from rising too fast. Fiber is found in fruits, vegetables, whole grains, beans, peas, and many nuts.  Carb counting  Keep track of the amount of carbohydrates you eat. This can help you keep the right balance of physical activity and medicine. The amount of carbohydrates needed will vary for each person. It depends on many things such as your health, the medicines you take, and how active you are. Your healthcare team will help you figure out the right amount of carbohydrates for you. You may start with around 45 to 60 grams of carbohydrate per meal, depending on your situation. Carb counting is a system that helps you keep track of the carbohydrates you eat at each meal.  Carbohydrates come from a variety of foods. These include grains, starchy vegetables, fruit, milk, beans, and snack foods. You can either count carbohydrate grams or carbohydrate servings. When you count carbohydrate servings, 1 carbohydrate serving = 15 grams of carbohydrates.  Here are some examples of foods containing about 15 grams of carbohydrates (1 serving of carbohydrates):  · 1/2 cup of canned or frozen fruit  · A small piece of fresh fruit (4 ounces)  · 1 slice of bread  · 1/2 cup of oatmeal  · 1/3 cup of rice  · 4 to 6 crackers  · 1/2 English muffin  · 1/2 cup of black beans  · 1/4 of a large baked potato (3 ounces)  · 2/3 cup of plain fat-free yogurt  · 1 cup of soup  · 1/2 cup of casserole  · 6 chicken nuggets  · 2-inch-square brownie or cake without frosting  · 2 small cookies  · 1/2 cup of ice cream or sherbet  Carb counting is easier when food labels are available. Look at the label to see how many grams of total carbohydrates the food contains. Then you can figure out how much you should eat.  Two very important lines to look at on the label are the serving size and the total carbohydrate amount. Here are some  tips for using food labels to count your carbohydrate intake:  · Check the serving size. The information on the label is based on that serving size. If you eat more than the listed serving size, you may have to double or triple the other information on the label.   · Check the total grams of carbohydrates. Total carbohydrate from the label includes sugar, starch, and fiber. Be sure to use the total carbohydrate number and not sugar alone.  · Know how many grams of carbohydrates you can have.  Be familiar with the matching portion sizes.  · Compare labels. Compare the labels of different products, looking at serving sizes and total carbohydrates to find the products that work best for you.   · Don't forget protein and fat. With all the focus on carb counting, it might be easy to forget protein and fat in your meals. Don't forget to include sources of protein and healthy fat to balance your meals.  Its also important to be consistent with the amount and time you eat when taking a fixed dose of diabetes medicine. Work with your healthcare provider or dietitian if you need additional help. He or she can help you keep track of your carbohydrate intake. He or she can also help you figure out how many grams of carbohydrates you should have.  Date Last Reviewed: 7/1/2016  © 4056-9186 [employer]. [address] [place] [address], [place] [address]. All rights reserved. This information is not intended as a substitute for professional medical care. Always follow your healthcare professional's instructions.

## 2019-04-17 NOTE — PATIENT INSTRUCTIONS
Muscle Strain in the Extremities  A muscle strain is a stretching and tearing of muscle fibers. This causes pain, especially when you move that muscle. There may also be some swelling and bruising.  Home care  · Keep the hurt area raised to reduce pain and swelling. This is especially important during the first 48 hours.  · Apply an ice pack over the injured area for 15 to 20 minutes every 3 to 6 hours. You should do this for the first 24 to 48 hours. You can make an ice pack by filling a plastic bag that seals at the top with ice cubes and then wrapping it with a thin towel. Be careful not to injure your skin with the ice treatments. Ice should never be applied directly to skin. Continue the use of ice packs for relief of pain and swelling as needed. After 48 hours, apply heat (warm shower or warm bath) for 15 to 20 minutes several times a day, or alternate ice and heat.  · You may use over-the-counter pain medicine to control pain, unless another medicine was prescribed. If you have chronic liver or kidney disease or ever had a stomach ulcer or GI bleeding, talk with your healthcare provider before using these medicines.  · For leg strains: If crutches have been recommended, dont put full weight on the hurt leg until you can do so without pain. You can return to sports when you are able to hop and run on the injured leg without pain.  Follow-up care  Follow up with your healthcare provider, or as advised.  When to seek medical advice  Call your healthcare provider right away if any of these occur:  · The toes of the injured leg become swollen, cold, blue, numb, or tingly  · Pain or swelling increases  Date Last Reviewed: 11/19/2015  © 0042-2880 [employer]. [address] [place] [address], [place] [address]. All rights reserved. This information is not intended as a substitute for professional medical care. Always follow your healthcare professional's instructions.

## 2019-04-23 ENCOUNTER — TELEPHONE (OUTPATIENT)
Dept: CARDIOLOGY | Facility: CLINIC | Age: 79
End: 2019-04-23

## 2019-04-23 DIAGNOSIS — I35.0 NONRHEUMATIC AORTIC VALVE STENOSIS: ICD-10-CM

## 2019-04-23 DIAGNOSIS — E78.2 MIXED HYPERLIPIDEMIA: ICD-10-CM

## 2019-04-23 DIAGNOSIS — N18.30 CHRONIC KIDNEY DISEASE, STAGE III (MODERATE): ICD-10-CM

## 2019-04-23 DIAGNOSIS — I10 ESSENTIAL HYPERTENSION: Primary | ICD-10-CM

## 2019-04-23 DIAGNOSIS — I77.9 BILATERAL CAROTID ARTERY DISEASE, UNSPECIFIED TYPE: ICD-10-CM

## 2019-04-23 DIAGNOSIS — I51.89 LEFT VENTRICULAR DIASTOLIC DYSFUNCTION WITH PRESERVED SYSTOLIC FUNCTION: ICD-10-CM

## 2019-04-23 DIAGNOSIS — Z79.4 CONTROLLED TYPE 2 DIABETES MELLITUS WITH OTHER CIRCULATORY COMPLICATION, WITH LONG-TERM CURRENT USE OF INSULIN: ICD-10-CM

## 2019-04-23 DIAGNOSIS — E11.59 CONTROLLED TYPE 2 DIABETES MELLITUS WITH OTHER CIRCULATORY COMPLICATION, WITH LONG-TERM CURRENT USE OF INSULIN: ICD-10-CM

## 2019-04-24 ENCOUNTER — HOSPITAL ENCOUNTER (OUTPATIENT)
Dept: RADIOLOGY | Facility: HOSPITAL | Age: 79
Discharge: HOME | End: 2019-04-24
Attending: INTERNAL MEDICINE
Payer: MEDICARE

## 2019-04-24 DIAGNOSIS — Z12.31 VISIT FOR SCREENING MAMMOGRAM: ICD-10-CM

## 2019-04-24 PROCEDURE — 77067 SCR MAMMO BI INCL CAD: CPT | Mod: TC,HCNC,PO

## 2019-04-24 PROCEDURE — 77063 MAMMO DIGITAL SCREENING BILAT WITH TOMOSYNTHESIS_CAD: ICD-10-PCS | Mod: 26,HCNC,, | Performed by: RADIOLOGY

## 2019-04-24 PROCEDURE — 77067 SCR MAMMO BI INCL CAD: CPT | Mod: 26,HCNC,, | Performed by: RADIOLOGY

## 2019-04-24 PROCEDURE — 77063 BREAST TOMOSYNTHESIS BI: CPT | Mod: 26,HCNC,, | Performed by: RADIOLOGY

## 2019-04-24 PROCEDURE — 77067 MAMMO DIGITAL SCREENING BILAT WITH TOMOSYNTHESIS_CAD: ICD-10-PCS | Mod: 26,HCNC,, | Performed by: RADIOLOGY

## 2019-04-26 ENCOUNTER — TELEPHONE (OUTPATIENT)
Dept: INTERNAL MEDICINE | Facility: CLINIC | Age: 79
End: 2019-04-26

## 2019-04-26 NOTE — TELEPHONE ENCOUNTER
----- Message from Karina Wilson MD sent at 4/25/2019  2:18 PM CDT -----  Please note that your mammogram was read as follows  Impression:  There is no mammographic evidence of malignancy.  Karina Cabrales

## 2019-05-06 ENCOUNTER — CLINICAL SUPPORT (OUTPATIENT)
Dept: AUDIOLOGY | Facility: CLINIC | Age: 79
End: 2019-05-06
Payer: MEDICARE

## 2019-05-06 DIAGNOSIS — H90.3 SENSORINEURAL HEARING LOSS, BILATERAL: Primary | ICD-10-CM

## 2019-05-06 PROCEDURE — 92550 TYMPANOMETRY & REFLEX THRESH: CPT | Mod: S$GLB,,, | Performed by: AUDIOLOGIST

## 2019-05-06 PROCEDURE — 92550 PR TYMPANOMETRY AND REFLEX THRESHOLD MEASUREMENTS: ICD-10-PCS | Mod: S$GLB,,, | Performed by: AUDIOLOGIST

## 2019-05-06 PROCEDURE — 92557 COMPREHENSIVE HEARING TEST: CPT | Mod: S$GLB,,, | Performed by: AUDIOLOGIST

## 2019-05-06 PROCEDURE — 92557 PR COMPREHENSIVE HEARING TEST: ICD-10-PCS | Mod: S$GLB,,, | Performed by: AUDIOLOGIST

## 2019-05-06 NOTE — PROGRESS NOTES
Click the link below to view the audiogram in full:  Document on 5/6/2019 10:22 AM by BEBO GarberD: Audiogram    Findings:     Pt was seen today for hearing testing.  She reported that she has noticed a decrease in her hearing overall.  Pt has a history of constant bilateral tinnitus that has been present for 40 years.  She also reported a history of dizziness that began 15 years ago and is managed by her PCP.  Dizziness flares up intermittently and she can go years between episodes.  Pt takes meclizine to manage dizziness.  Pt denied any pain or pressure in her ears.  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  Results were discussed with patient and hearing aids were recommended.  It was also recommended that the patient follow up with ENT to manage dizziness and monitor slight difference in hearing.  Pt will consider hearing aids and follow up when ready.

## 2019-05-07 LAB
LEFT EYE DM RETINOPATHY: NEGATIVE
RIGHT EYE DM RETINOPATHY: NEGATIVE

## 2019-05-15 ENCOUNTER — TELEPHONE (OUTPATIENT)
Dept: ADMINISTRATIVE | Facility: HOSPITAL | Age: 79
End: 2019-05-15

## 2019-05-30 DIAGNOSIS — I10 ESSENTIAL HYPERTENSION: ICD-10-CM

## 2019-05-30 RX ORDER — HYDROCHLOROTHIAZIDE 50 MG/1
50 TABLET ORAL DAILY
Qty: 90 TABLET | Refills: 3 | Status: SHIPPED | OUTPATIENT
Start: 2019-05-30 | End: 2020-02-24 | Stop reason: SDUPTHER

## 2019-06-18 ENCOUNTER — LAB VISIT (OUTPATIENT)
Dept: LAB | Facility: HOSPITAL | Age: 79
End: 2019-06-18
Attending: INTERNAL MEDICINE
Payer: MEDICARE

## 2019-06-18 DIAGNOSIS — R80.9 TYPE 2 DIABETES MELLITUS WITH MICROALBUMINURIA, WITHOUT LONG-TERM CURRENT USE OF INSULIN: ICD-10-CM

## 2019-06-18 DIAGNOSIS — E11.29 TYPE 2 DIABETES MELLITUS WITH MICROALBUMINURIA, WITHOUT LONG-TERM CURRENT USE OF INSULIN: ICD-10-CM

## 2019-06-18 DIAGNOSIS — I10 ESSENTIAL HYPERTENSION: ICD-10-CM

## 2019-06-18 DIAGNOSIS — Z00.00 ANNUAL PHYSICAL EXAM: ICD-10-CM

## 2019-06-18 LAB
ALBUMIN SERPL BCP-MCNC: 3.8 G/DL (ref 3.5–5.2)
ALP SERPL-CCNC: 43 U/L (ref 55–135)
ALT SERPL W/O P-5'-P-CCNC: 29 U/L (ref 10–44)
ANION GAP SERPL CALC-SCNC: 11 MMOL/L (ref 8–16)
AST SERPL-CCNC: 24 U/L (ref 10–40)
BASOPHILS # BLD AUTO: 0.04 K/UL (ref 0–0.2)
BASOPHILS NFR BLD: 0.6 % (ref 0–1.9)
BILIRUB SERPL-MCNC: 0.4 MG/DL (ref 0.1–1)
BUN SERPL-MCNC: 21 MG/DL (ref 8–23)
CALCIUM SERPL-MCNC: 9.7 MG/DL (ref 8.7–10.5)
CHLORIDE SERPL-SCNC: 105 MMOL/L (ref 95–110)
CHOLEST SERPL-MCNC: 193 MG/DL (ref 120–199)
CHOLEST/HDLC SERPL: 3.8 {RATIO} (ref 2–5)
CO2 SERPL-SCNC: 26 MMOL/L (ref 23–29)
CREAT SERPL-MCNC: 1 MG/DL (ref 0.5–1.4)
DIFFERENTIAL METHOD: ABNORMAL
EOSINOPHIL # BLD AUTO: 0.2 K/UL (ref 0–0.5)
EOSINOPHIL NFR BLD: 2.8 % (ref 0–8)
ERYTHROCYTE [DISTWIDTH] IN BLOOD BY AUTOMATED COUNT: 13 % (ref 11.5–14.5)
EST. GFR  (AFRICAN AMERICAN): >60 ML/MIN/1.73 M^2
EST. GFR  (NON AFRICAN AMERICAN): 54.1 ML/MIN/1.73 M^2
ESTIMATED AVG GLUCOSE: 148 MG/DL (ref 68–131)
GLUCOSE SERPL-MCNC: 135 MG/DL (ref 70–110)
HBA1C MFR BLD HPLC: 6.8 % (ref 4–5.6)
HCT VFR BLD AUTO: 35.7 % (ref 37–48.5)
HDLC SERPL-MCNC: 51 MG/DL (ref 40–75)
HDLC SERPL: 26.4 % (ref 20–50)
HGB BLD-MCNC: 11.7 G/DL (ref 12–16)
IMM GRANULOCYTES # BLD AUTO: 0.02 K/UL (ref 0–0.04)
IMM GRANULOCYTES NFR BLD AUTO: 0.3 % (ref 0–0.5)
LDLC SERPL CALC-MCNC: 118.2 MG/DL (ref 63–159)
LYMPHOCYTES # BLD AUTO: 1.9 K/UL (ref 1–4.8)
LYMPHOCYTES NFR BLD: 28.3 % (ref 18–48)
MCH RBC QN AUTO: 29.5 PG (ref 27–31)
MCHC RBC AUTO-ENTMCNC: 32.8 G/DL (ref 32–36)
MCV RBC AUTO: 90 FL (ref 82–98)
MONOCYTES # BLD AUTO: 0.6 K/UL (ref 0.3–1)
MONOCYTES NFR BLD: 8.2 % (ref 4–15)
NEUTROPHILS # BLD AUTO: 4.1 K/UL (ref 1.8–7.7)
NEUTROPHILS NFR BLD: 59.8 % (ref 38–73)
NONHDLC SERPL-MCNC: 142 MG/DL
NRBC BLD-RTO: 0 /100 WBC
PLATELET # BLD AUTO: 290 K/UL (ref 150–350)
PMV BLD AUTO: 11.9 FL (ref 9.2–12.9)
POTASSIUM SERPL-SCNC: 4.1 MMOL/L (ref 3.5–5.1)
PROT SERPL-MCNC: 6.7 G/DL (ref 6–8.4)
RBC # BLD AUTO: 3.96 M/UL (ref 4–5.4)
SODIUM SERPL-SCNC: 142 MMOL/L (ref 136–145)
TRIGL SERPL-MCNC: 119 MG/DL (ref 30–150)
TSH SERPL DL<=0.005 MIU/L-ACNC: 2.58 UIU/ML (ref 0.4–4)
WBC # BLD AUTO: 6.82 K/UL (ref 3.9–12.7)

## 2019-06-18 PROCEDURE — 83036 HEMOGLOBIN GLYCOSYLATED A1C: CPT | Mod: HCNC

## 2019-06-18 PROCEDURE — 84443 ASSAY THYROID STIM HORMONE: CPT | Mod: HCNC

## 2019-06-18 PROCEDURE — 36415 COLL VENOUS BLD VENIPUNCTURE: CPT | Mod: HCNC,PO

## 2019-06-18 PROCEDURE — 85025 COMPLETE CBC W/AUTO DIFF WBC: CPT | Mod: HCNC

## 2019-06-18 PROCEDURE — 80061 LIPID PANEL: CPT | Mod: HCNC

## 2019-06-18 PROCEDURE — 80053 COMPREHEN METABOLIC PANEL: CPT | Mod: HCNC

## 2019-06-20 ENCOUNTER — TELEPHONE (OUTPATIENT)
Dept: INTERNAL MEDICINE | Facility: CLINIC | Age: 79
End: 2019-06-20

## 2019-06-20 NOTE — TELEPHONE ENCOUNTER
----- Message from Karina Wilson MD sent at 6/19/2019  5:08 PM CDT -----  Please review your lab work and we will discuss at your pending office visit.  Karina Cabrales

## 2019-06-23 NOTE — PROGRESS NOTES
CC: Annual PE  78 yo female presents for PE  Ex-smoker having quit a number of years ago, social alcohol consumer.                                                                                        FAMILY HISTORY:    Mom d. 85, CHF.    Dad d. 60, MI, he was alcoholic.    Older sister d. Colon cancer.  She had a blood clot.    Sister, + RA, in poor health, lung cancer s/p chemo.    Sister, s/p bypass      Brother d. 83 MI, s/p stent and a bypass.  + DM    6th /6 children                                                                                                    SCREENING TEST:    Cholesterol/lab, reviewed  Colonoscopy 10/2011, Dr. Caldwell.  He recommended 7 years. TOo busy for C-scope - will give FITKIT today  [place], Dr. Means            Mammogram, gyn  DEXA, 2018  IMPRESSION:  Normal    Thyroid US/ FNA, 6/2015   Eye exam, UTD  DDS exam, UTD    VACCINATIONS:   Zostavax, 4/2011   Tdap, 2/2014.     Pneumovax, 2006   Prevnar,  2015    Flu, yearly                                                                                                  MEDS:  In the MedCard.                                                                                                                                    REVIEW OF SYSTEMS:    No fever, chill, or night sweats  No chest pain, shortness of breath, PND,  Or orthopnea.    No cough or wheezing.    No change in bowel or bladder function.   Nocturia without dysuria, hematuria.    No unusual headaches.  No weakness    in arms or legs or slurred speech.    Left arm discomfort, after picking up heavy bucket  ADL's: 100 % independent  Memory: Good  Mental Health: Good- stress spouse fx hip, then rehab then home, then fell again in shower- ++ huge  hematoma  S/p blood transfusion x 2  16yo grandson staying x 2 mos while Dad goes to [facility] for Gov. duty  AD's: + will, living will, and POA  Nutrition: Good  Safety: intact  Gait: no recent falls  Urinary incontinence: n/a, ++  abn urine odor, ? dysuria      Remainder of review negative except as   previously noted.                                                                                                                                        PHYSICAL EXAM:   VSS                       GENERAL:  She is alert and oriented, in no acute distress.  She is well      developed, well nourished, conversant and cooperative. Pleasant as always.    EYES: Conjunctivae and     lids are unremarkable.  Sclerae are anicteric.  Pupils are reactive.         ENT; Canal and TMs are unremarkable.  Nasal mucosa, turbinates, oropharynx      unremarkable.  Sinuses nontender.                                            NECK:  Supple. + thyroid goiter, no bruit noted, no LN noted                                      RESPIRATORY:  Efforts unlabored.                                             LUNGS:  Clear to auscultation.                                               HEART:  Regular rate and rhythm. No bruits over the carotids noted ( known ds)   , 1+ pedal pulse, no edema today.                                 ABDOMEN:  Bowel sounds present, soft, nontender.  No hepatosplenomegaly.     MSK: Gait normal. No CCE  NEURO: VIEIRA. No tremor noted  SKIN: Warm and dry  FEET: Monofilament intact  Vibratory decreased  Callus and dystrophic nails Left>>>right                                                                               IMPRESSIONS:                                                                    Annual PE                                                   FHX: MI.     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                                HTN , stable                              HLD, .improved on atorvastatin and fenofibrate                                                         Aortic atherosclerosis, asx                                                                 Carotid artery disease,  stable by hx      Osteopenia, asx on supplements calcium and VIt D     Thyroid goiter, asx  , s/p bx      GERD, stable on Prilosec          Urinary odor, discomfort                                                                                                                                                                                                                             PLAN:   FitKit  Urine dup  Urine culture  Continue present meds  Continue f/u w/ cards and endo  Diabetic diet  Exercise  Call prn  RTC 6 mos

## 2019-06-25 ENCOUNTER — OFFICE VISIT (OUTPATIENT)
Dept: INTERNAL MEDICINE | Facility: CLINIC | Age: 79
End: 2019-06-25
Payer: MEDICARE

## 2019-06-25 VITALS
DIASTOLIC BLOOD PRESSURE: 56 MMHG | BODY MASS INDEX: 28.16 KG/M2 | WEIGHT: 175.25 LBS | RESPIRATION RATE: 16 BRPM | TEMPERATURE: 98 F | SYSTOLIC BLOOD PRESSURE: 132 MMHG | HEART RATE: 68 BPM | HEIGHT: 66 IN

## 2019-06-25 DIAGNOSIS — E11.69 HYPERLIPIDEMIA ASSOCIATED WITH TYPE 2 DIABETES MELLITUS: ICD-10-CM

## 2019-06-25 DIAGNOSIS — I77.9 BILATERAL CAROTID ARTERY DISEASE, UNSPECIFIED TYPE: ICD-10-CM

## 2019-06-25 DIAGNOSIS — R30.0 DYSURIA: ICD-10-CM

## 2019-06-25 DIAGNOSIS — E11.59 TYPE 2 DIABETES MELLITUS WITH OTHER CIRCULATORY COMPLICATION, WITHOUT LONG-TERM CURRENT USE OF INSULIN: ICD-10-CM

## 2019-06-25 DIAGNOSIS — I15.2 HYPERTENSION ASSOCIATED WITH DIABETES: ICD-10-CM

## 2019-06-25 DIAGNOSIS — Z00.00 ANNUAL PHYSICAL EXAM: Primary | ICD-10-CM

## 2019-06-25 DIAGNOSIS — Z12.11 COLON CANCER SCREENING: ICD-10-CM

## 2019-06-25 DIAGNOSIS — E11.29 TYPE 2 DIABETES MELLITUS WITH MICROALBUMINURIA, WITHOUT LONG-TERM CURRENT USE OF INSULIN: ICD-10-CM

## 2019-06-25 DIAGNOSIS — E78.5 HYPERLIPIDEMIA ASSOCIATED WITH TYPE 2 DIABETES MELLITUS: ICD-10-CM

## 2019-06-25 DIAGNOSIS — R80.9 TYPE 2 DIABETES MELLITUS WITH MICROALBUMINURIA, WITHOUT LONG-TERM CURRENT USE OF INSULIN: ICD-10-CM

## 2019-06-25 DIAGNOSIS — E11.59 HYPERTENSION ASSOCIATED WITH DIABETES: ICD-10-CM

## 2019-06-25 DIAGNOSIS — I70.0 AORTIC ATHEROSCLEROSIS: ICD-10-CM

## 2019-06-25 LAB
BILIRUB SERPL-MCNC: NORMAL MG/DL
BLOOD URINE, POC: NORMAL
COLOR, POC UA: NORMAL
GLUCOSE UR QL STRIP: NORMAL
KETONES UR QL STRIP: NORMAL
LEUKOCYTE ESTERASE URINE, POC: NORMAL
NITRITE, POC UA: NORMAL
PH, POC UA: 5
PROTEIN, POC: NORMAL
SPECIFIC GRAVITY, POC UA: 1.01
UROBILINOGEN, POC UA: NORMAL

## 2019-06-25 PROCEDURE — 3078F DIAST BP <80 MM HG: CPT | Mod: HCNC,CPTII,S$GLB, | Performed by: INTERNAL MEDICINE

## 2019-06-25 PROCEDURE — 87086 URINE CULTURE/COLONY COUNT: CPT | Mod: HCNC

## 2019-06-25 PROCEDURE — 87186 SC STD MICRODIL/AGAR DIL: CPT | Mod: HCNC

## 2019-06-25 PROCEDURE — 3078F PR MOST RECENT DIASTOLIC BLOOD PRESSURE < 80 MM HG: ICD-10-PCS | Mod: HCNC,CPTII,S$GLB, | Performed by: INTERNAL MEDICINE

## 2019-06-25 PROCEDURE — 3075F PR MOST RECENT SYSTOLIC BLOOD PRESS GE 130-139MM HG: ICD-10-PCS | Mod: HCNC,CPTII,S$GLB, | Performed by: INTERNAL MEDICINE

## 2019-06-25 PROCEDURE — 81002 URINALYSIS NONAUTO W/O SCOPE: CPT | Mod: HCNC,S$GLB,, | Performed by: INTERNAL MEDICINE

## 2019-06-25 PROCEDURE — 99397 PR PREVENTIVE VISIT,EST,65 & OVER: ICD-10-PCS | Mod: HCNC,S$GLB,, | Performed by: INTERNAL MEDICINE

## 2019-06-25 PROCEDURE — 99999 PR PBB SHADOW E&M-EST. PATIENT-LVL III: ICD-10-PCS | Mod: PBBFAC,HCNC,, | Performed by: INTERNAL MEDICINE

## 2019-06-25 PROCEDURE — 99397 PER PM REEVAL EST PAT 65+ YR: CPT | Mod: HCNC,S$GLB,, | Performed by: INTERNAL MEDICINE

## 2019-06-25 PROCEDURE — 87077 CULTURE AEROBIC IDENTIFY: CPT | Mod: HCNC

## 2019-06-25 PROCEDURE — 99999 PR PBB SHADOW E&M-EST. PATIENT-LVL III: CPT | Mod: PBBFAC,HCNC,, | Performed by: INTERNAL MEDICINE

## 2019-06-25 PROCEDURE — 81002 POCT URINE DIPSTICK WITHOUT MICROSCOPE: ICD-10-PCS | Mod: HCNC,S$GLB,, | Performed by: INTERNAL MEDICINE

## 2019-06-25 PROCEDURE — 87088 URINE BACTERIA CULTURE: CPT | Mod: HCNC

## 2019-06-25 PROCEDURE — 3075F SYST BP GE 130 - 139MM HG: CPT | Mod: HCNC,CPTII,S$GLB, | Performed by: INTERNAL MEDICINE

## 2019-06-27 ENCOUNTER — TELEPHONE (OUTPATIENT)
Dept: INTERNAL MEDICINE | Facility: CLINIC | Age: 79
End: 2019-06-27

## 2019-06-27 LAB — BACTERIA UR CULT: NORMAL

## 2019-06-27 RX ORDER — AMPICILLIN 500 MG/1
500 CAPSULE ORAL 3 TIMES DAILY
Qty: 15 CAPSULE | Refills: 0 | Status: SHIPPED | OUTPATIENT
Start: 2019-06-27 | End: 2019-07-29 | Stop reason: ALTCHOICE

## 2019-06-27 NOTE — TELEPHONE ENCOUNTER
Please advise pt that she has UTI  escripted ampicillin 500mg tid x 5 days to [facility]  W/ food and 8 oz liquid and probiotics

## 2019-07-23 ENCOUNTER — TELEPHONE (OUTPATIENT)
Dept: CARDIOLOGY | Facility: CLINIC | Age: 79
End: 2019-07-23

## 2019-07-23 DIAGNOSIS — I10 ESSENTIAL HYPERTENSION: Primary | ICD-10-CM

## 2019-07-23 DIAGNOSIS — I10 ESSENTIAL HYPERTENSION: ICD-10-CM

## 2019-07-23 RX ORDER — LOSARTAN POTASSIUM 25 MG/1
25 TABLET ORAL DAILY
COMMUNITY
End: 2019-07-23 | Stop reason: SDUPTHER

## 2019-07-23 RX ORDER — LOSARTAN POTASSIUM 25 MG/1
25 TABLET ORAL DAILY
Qty: 90 TABLET | Refills: 3 | Status: SHIPPED | OUTPATIENT
Start: 2019-07-23 | End: 2019-08-15 | Stop reason: DRUGHIGH

## 2019-07-23 RX ORDER — LISINOPRIL 10 MG/1
10 TABLET ORAL DAILY
Qty: 90 TABLET | Refills: 3 | Status: SHIPPED | OUTPATIENT
Start: 2019-07-23 | End: 2019-07-23 | Stop reason: ALTCHOICE

## 2019-07-29 ENCOUNTER — OFFICE VISIT (OUTPATIENT)
Dept: CARDIOLOGY | Facility: CLINIC | Age: 79
End: 2019-07-29
Payer: MEDICARE

## 2019-07-29 VITALS
WEIGHT: 175.81 LBS | SYSTOLIC BLOOD PRESSURE: 160 MMHG | HEIGHT: 66 IN | BODY MASS INDEX: 28.26 KG/M2 | HEART RATE: 72 BPM | DIASTOLIC BLOOD PRESSURE: 70 MMHG

## 2019-07-29 DIAGNOSIS — R80.9 TYPE 2 DIABETES MELLITUS WITH MICROALBUMINURIA, WITHOUT LONG-TERM CURRENT USE OF INSULIN: ICD-10-CM

## 2019-07-29 DIAGNOSIS — I15.2 OBESITY, DIABETES, AND HYPERTENSION SYNDROME: ICD-10-CM

## 2019-07-29 DIAGNOSIS — I15.2 HYPERTENSION ASSOCIATED WITH DIABETES: Primary | ICD-10-CM

## 2019-07-29 DIAGNOSIS — I51.89 LEFT VENTRICULAR DIASTOLIC DYSFUNCTION WITH PRESERVED SYSTOLIC FUNCTION: ICD-10-CM

## 2019-07-29 DIAGNOSIS — E11.69 HYPERLIPIDEMIA ASSOCIATED WITH TYPE 2 DIABETES MELLITUS: ICD-10-CM

## 2019-07-29 DIAGNOSIS — I65.23 BILATERAL CAROTID ARTERY STENOSIS: ICD-10-CM

## 2019-07-29 DIAGNOSIS — E11.59 HYPERTENSION ASSOCIATED WITH DIABETES: Primary | ICD-10-CM

## 2019-07-29 DIAGNOSIS — E11.59 OBESITY, DIABETES, AND HYPERTENSION SYNDROME: ICD-10-CM

## 2019-07-29 DIAGNOSIS — E11.9 DM TYPE 2 WITHOUT RETINOPATHY: ICD-10-CM

## 2019-07-29 DIAGNOSIS — E04.2 NONTOXIC MULTINODULAR GOITER: ICD-10-CM

## 2019-07-29 DIAGNOSIS — E11.69 OBESITY, DIABETES, AND HYPERTENSION SYNDROME: ICD-10-CM

## 2019-07-29 DIAGNOSIS — N18.30 CKD STAGE 3 SECONDARY TO DIABETES: ICD-10-CM

## 2019-07-29 DIAGNOSIS — Z95.2 S/P AVR (AORTIC VALVE REPLACEMENT): ICD-10-CM

## 2019-07-29 DIAGNOSIS — E66.3 OVERWEIGHT (BMI 25.0-29.9): ICD-10-CM

## 2019-07-29 DIAGNOSIS — E66.9 OBESITY, DIABETES, AND HYPERTENSION SYNDROME: ICD-10-CM

## 2019-07-29 DIAGNOSIS — I70.0 AORTIC ATHEROSCLEROSIS: ICD-10-CM

## 2019-07-29 DIAGNOSIS — I35.0 NONRHEUMATIC AORTIC VALVE STENOSIS: ICD-10-CM

## 2019-07-29 DIAGNOSIS — E11.29 TYPE 2 DIABETES MELLITUS WITH MICROALBUMINURIA, WITHOUT LONG-TERM CURRENT USE OF INSULIN: ICD-10-CM

## 2019-07-29 DIAGNOSIS — E78.5 HYPERLIPIDEMIA ASSOCIATED WITH TYPE 2 DIABETES MELLITUS: ICD-10-CM

## 2019-07-29 DIAGNOSIS — E11.22 CKD STAGE 3 SECONDARY TO DIABETES: ICD-10-CM

## 2019-07-29 PROCEDURE — 99999 PR PBB SHADOW E&M-EST. PATIENT-LVL III: ICD-10-PCS | Mod: PBBFAC,HCNC,, | Performed by: INTERNAL MEDICINE

## 2019-07-29 PROCEDURE — 1101F PR PT FALLS ASSESS DOC 0-1 FALLS W/OUT INJ PAST YR: ICD-10-PCS | Mod: HCNC,CPTII,S$GLB, | Performed by: INTERNAL MEDICINE

## 2019-07-29 PROCEDURE — 3077F PR MOST RECENT SYSTOLIC BLOOD PRESSURE >= 140 MM HG: ICD-10-PCS | Mod: HCNC,CPTII,S$GLB, | Performed by: INTERNAL MEDICINE

## 2019-07-29 PROCEDURE — 99999 PR PBB SHADOW E&M-EST. PATIENT-LVL III: CPT | Mod: PBBFAC,HCNC,, | Performed by: INTERNAL MEDICINE

## 2019-07-29 PROCEDURE — 99214 OFFICE O/P EST MOD 30 MIN: CPT | Mod: HCNC,S$GLB,, | Performed by: INTERNAL MEDICINE

## 2019-07-29 PROCEDURE — 3078F DIAST BP <80 MM HG: CPT | Mod: HCNC,CPTII,S$GLB, | Performed by: INTERNAL MEDICINE

## 2019-07-29 PROCEDURE — 3078F PR MOST RECENT DIASTOLIC BLOOD PRESSURE < 80 MM HG: ICD-10-PCS | Mod: HCNC,CPTII,S$GLB, | Performed by: INTERNAL MEDICINE

## 2019-07-29 PROCEDURE — 99214 PR OFFICE/OUTPT VISIT, EST, LEVL IV, 30-39 MIN: ICD-10-PCS | Mod: HCNC,S$GLB,, | Performed by: INTERNAL MEDICINE

## 2019-07-29 PROCEDURE — 1101F PT FALLS ASSESS-DOCD LE1/YR: CPT | Mod: HCNC,CPTII,S$GLB, | Performed by: INTERNAL MEDICINE

## 2019-07-29 PROCEDURE — 3077F SYST BP >= 140 MM HG: CPT | Mod: HCNC,CPTII,S$GLB, | Performed by: INTERNAL MEDICINE

## 2019-07-29 NOTE — PROGRESS NOTES
"Subjective:   Patient ID:  Zaida Liu is a 79 y.o. female who presents for follow-up of Hypertension      HPI: Stopped Lisinopril, but did not start Losartan yet. BP is elevated today. Patient under increased amount of stress due to her  prolonged illness.    Lab Results   Component Value Date     06/18/2019    K 4.1 06/18/2019     06/18/2019    CO2 26 06/18/2019    BUN 21 06/18/2019    CREATININE 1.0 06/18/2019     (H) 06/18/2019    HGBA1C 6.8 (H) 06/18/2019    MG 1.5 (L) 12/15/2015    AST 24 06/18/2019    ALT 29 06/18/2019    ALBUMIN 3.8 06/18/2019    PROT 6.7 06/18/2019    BILITOT 0.4 06/18/2019    WBC 6.82 06/18/2019    HGB 11.7 (L) 06/18/2019    HCT 35.7 (L) 06/18/2019    HCT 28 (L) 08/25/2015    MCV 90 06/18/2019     06/18/2019    INR 1.0 12/15/2015    TSH 2.584 06/18/2019    CHOL 193 06/18/2019    HDL 51 06/18/2019    LDLCALC 118.2 06/18/2019    TRIG 119 06/18/2019       Review of Systems   Constitution: Negative.   HENT: Positive for hearing loss.    Eyes: Negative.    Cardiovascular: Negative.  Negative for chest pain, claudication, dyspnea on exertion, irregular heartbeat, leg swelling, near-syncope, palpitations and syncope.   Respiratory: Negative.  Negative for cough, shortness of breath, snoring and wheezing.    Endocrine: Negative.  Negative for cold intolerance, heat intolerance, polydipsia, polyphagia and polyuria.   Skin: Negative.    Musculoskeletal: Positive for arthritis, back pain and joint pain.   Gastrointestinal: Negative.    Genitourinary: Negative.    Neurological: Positive for dizziness and loss of balance.   Psychiatric/Behavioral: Negative.        Objective:   Physical Exam   Constitutional: She is oriented to person, place, and time. She appears well-developed and well-nourished.   BP (!) 160/70   Pulse 72   Ht 5' 6" (1.676 m)   Wt 79.8 kg (175 lb 13.1 oz)   BMI 28.38 kg/m²      HENT:   Head: Normocephalic.   Eyes: Pupils are equal, round, and " reactive to light.   Neck: Normal range of motion. Neck supple. Carotid bruit is present. Thyromegaly present.   Cardiovascular: Normal rate, regular rhythm and intact distal pulses. Exam reveals no gallop and no friction rub.   Murmur heard.   Early systolic murmur is present with a grade of 1/6 at the upper right sternal border.  Pulses:       Carotid pulses are 2+ on the right side with bruit, and 2+ on the left side with bruit.       Radial pulses are 2+ on the right side, and 2+ on the left side.        Femoral pulses are 2+ on the right side, and 2+ on the left side.       Popliteal pulses are 2+ on the right side, and 2+ on the left side.        Dorsalis pedis pulses are 2+ on the right side, and 2+ on the left side.        Posterior tibial pulses are 2+ on the right side, and 2+ on the left side.   Pulmonary/Chest: Effort normal and breath sounds normal. No respiratory distress. She has no wheezes. She has no rales. She exhibits no tenderness.   Abdominal: Soft. Bowel sounds are normal.   Musculoskeletal: Normal range of motion. She exhibits no edema.   Neurological: She is alert and oriented to person, place, and time.   Skin: Skin is warm and dry.   Psychiatric: She has a normal mood and affect.   Nursing note and vitals reviewed.        Assessment and Plan:     Problem List Items Addressed This Visit        Cardiology Problems    Hyperlipidemia associated with type 2 diabetes mellitus    Aortic stenosis    Hypertension associated with diabetes - Primary    Left ventricular diastolic dysfunction with preserved systolic function    Aortic atherosclerosis    Bilateral carotid artery disease       Other    Nontoxic multinodular goiter    S/P AVR (aortic valve replacement)    CKD stage 3 secondary to diabetes    Type 2 diabetes mellitus with microalbuminuria, without long-term current use of insulin    Obesity, diabetes, and hypertension syndrome    Overweight (BMI 25.0-29.9)    DM type 2 without retinopathy           Patient's Medications   New Prescriptions    No medications on file   Previous Medications    AMLODIPINE (NORVASC) 10 MG TABLET    TAKE 1 TABLET EVERY DAY    ASPIRIN (ECOTRIN) 81 MG EC TABLET    Take 81 mg by mouth once daily.    ATORVASTATIN (LIPITOR) 40 MG TABLET    TAKE 1 TABLET EVERY NIGHT    FENOFIBRATE MICRONIZED (LOFIBRA) 134 MG CAP    TAKE 1 CAPSULE EVERY DAY  WITH  BREAKFAST    HYDROCHLOROTHIAZIDE (HYDRODIURIL) 50 MG TABLET    TAKE 1 TABLET (50 MG TOTAL) BY MOUTH ONCE DAILY.    LOSARTAN (COZAAR) 25 MG TABLET    Take 1 tablet (25 mg total) by mouth once daily.    MECLIZINE HCL (MECLIZINE ORAL)    Take by mouth once daily. Pt unsure of dosage.    METFORMIN (GLUCOPHAGE) 500 MG TABLET    TAKE 1 TABLET EVERY DAY    METOPROLOL TARTRATE (LOPRESSOR) 25 MG TABLET    TAKE 1 TABLET TWICE DAILY    OMEPRAZOLE (PRILOSEC) 20 MG CAPSULE    Take 20 mg by mouth every morning.     VITAMIN D 1000 UNITS TAB    Take 1,000 Units by mouth once daily.   Modified Medications    No medications on file   Discontinued Medications    AMPICILLIN (PRINCIPEN) 500 MG CAPSULE    Take 1 capsule (500 mg total) by mouth 3 (three) times daily.     No change in the present regimen.    Follow up in about 1 year (around 7/29/2020).

## 2019-08-06 ENCOUNTER — TELEPHONE (OUTPATIENT)
Dept: CARDIOLOGY | Facility: CLINIC | Age: 79
End: 2019-08-06

## 2019-08-06 NOTE — TELEPHONE ENCOUNTER
Pt stated that she feels fine since starting Losartan. Pt stated that she has not been checking her bp,. Please advise.

## 2019-08-06 NOTE — TELEPHONE ENCOUNTER
----- Message from Marisol Martinez MA sent at 7/29/2019  3:01 PM CDT -----  Call pt to see how she is feeling on Losartan.

## 2019-08-07 ENCOUNTER — TELEPHONE (OUTPATIENT)
Dept: CARDIOLOGY | Facility: CLINIC | Age: 79
End: 2019-08-07

## 2019-08-07 NOTE — TELEPHONE ENCOUNTER
Pt notified, verbalized understanding. Pt stated that she has 2 bp machines at home and will record her readings and call us in a week.

## 2019-08-07 NOTE — TELEPHONE ENCOUNTER
----- Message from Evangelina Carias LPN sent at 8/6/2019  4:56 PM CDT -----  would be helpful if she could either come to the [facility] to check BP or go to [facility] etc and check it and let us know   Previous Messages            Advice Only     MD Marisol Levine MA 6 minutes ago (4:49 PM)     It would be helpful if she could either come to the [facility] to check BP or go to [facility] etc and check it and let us know   Routing comment     Marisol Martinez MA routed conversation to Cristela Gray MD 59 minutes ago (3:56 PM)    Marisol Martinez MA 1 hour ago (3:55 PM)          Pt stated that she feels fine since starting Losartan. Pt stated that she has not been checking her bp,. Please advise.       Documentation     VANDANA Braswell Anise T 1 hour ago (3:55 PM)    Marisol Martinez MA 1 hour ago (3:55 PM)          ----- Message from Marisol Martinez MA sent at 7/29/2019  3:01 PM CDT -----  Call pt to see how she is feeling on Losartan.          Documentation

## 2019-08-14 ENCOUNTER — TELEPHONE (OUTPATIENT)
Dept: CARDIOLOGY | Facility: CLINIC | Age: 79
End: 2019-08-14

## 2019-08-14 NOTE — TELEPHONE ENCOUNTER
----- Message from Marisol Martinez MA sent at 8/7/2019  8:19 AM CDT -----  Did pt call w/bp readings?

## 2019-08-14 NOTE — TELEPHONE ENCOUNTER
Please review bp readings below and advise. Pt stated that she didn't record her pulse and she checks her bp 2 hours after taking her medicine.  8/7/19 150/67 BEFORE MEDS  8/7/19 134/62  8/8/19 153/78 BEFORE MEDS  8/8/19 131/61  8/9/19 153/65 BEFORE MEDS  8/9/19 130/62  8/10/19 154/60 BEFORE MEDS  8/10/19 143/60   8/11/19 138/64 BEFORE MEDS  8/11/19 142/69  8/12/19 155/63  BEFORE MEDS  8/12/19 138/63

## 2019-08-15 RX ORDER — LOSARTAN POTASSIUM 50 MG/1
50 TABLET ORAL DAILY
COMMUNITY
End: 2020-06-29 | Stop reason: DRUGHIGH

## 2019-08-22 ENCOUNTER — OFFICE VISIT (OUTPATIENT)
Dept: OTOLARYNGOLOGY | Facility: CLINIC | Age: 79
End: 2019-08-22
Payer: MEDICARE

## 2019-08-22 VITALS
DIASTOLIC BLOOD PRESSURE: 48 MMHG | BODY MASS INDEX: 28.63 KG/M2 | SYSTOLIC BLOOD PRESSURE: 160 MMHG | WEIGHT: 178.13 LBS | HEIGHT: 66 IN

## 2019-08-22 DIAGNOSIS — H93.13 TINNITUS AURIUM, BILATERAL: ICD-10-CM

## 2019-08-22 DIAGNOSIS — H90.3 BILATERAL HIGH FREQUENCY SENSORINEURAL HEARING LOSS: ICD-10-CM

## 2019-08-22 DIAGNOSIS — H90.3 SENSORINEURAL HEARING LOSS, BILATERAL: ICD-10-CM

## 2019-08-22 PROCEDURE — 3078F PR MOST RECENT DIASTOLIC BLOOD PRESSURE < 80 MM HG: ICD-10-PCS | Mod: CPTII,S$GLB,, | Performed by: OTOLARYNGOLOGY

## 2019-08-22 PROCEDURE — 3078F DIAST BP <80 MM HG: CPT | Mod: CPTII,S$GLB,, | Performed by: OTOLARYNGOLOGY

## 2019-08-22 PROCEDURE — 99204 OFFICE O/P NEW MOD 45 MIN: CPT | Mod: S$GLB,,, | Performed by: OTOLARYNGOLOGY

## 2019-08-22 PROCEDURE — 3077F PR MOST RECENT SYSTOLIC BLOOD PRESSURE >= 140 MM HG: ICD-10-PCS | Mod: CPTII,S$GLB,, | Performed by: OTOLARYNGOLOGY

## 2019-08-22 PROCEDURE — 3077F SYST BP >= 140 MM HG: CPT | Mod: CPTII,S$GLB,, | Performed by: OTOLARYNGOLOGY

## 2019-08-22 PROCEDURE — 1101F PR PT FALLS ASSESS DOC 0-1 FALLS W/OUT INJ PAST YR: ICD-10-PCS | Mod: CPTII,S$GLB,, | Performed by: OTOLARYNGOLOGY

## 2019-08-22 PROCEDURE — 1101F PT FALLS ASSESS-DOCD LE1/YR: CPT | Mod: CPTII,S$GLB,, | Performed by: OTOLARYNGOLOGY

## 2019-08-22 PROCEDURE — 99204 PR OFFICE/OUTPT VISIT, NEW, LEVL IV, 45-59 MIN: ICD-10-PCS | Mod: S$GLB,,, | Performed by: OTOLARYNGOLOGY

## 2019-08-22 NOTE — PROGRESS NOTES
Subjective:       Patient ID: Zaida Liu is a 79 y.o. female.    Chief Complaint: Hearing Loss and Tinnitus    Hearing Loss:   Chronicity:  Chronic  Onset:  More than 1 year ago  Progression since onset:  Gradually worsening  Frequency:  Constantly  Severity:  Mild   Associated symptoms: tinnitus (bilatearl non-pulsatile).  No dizziness, no vertigo, no fever, no headaches, no aural fullness, no otalgia and no facial weakness.No dizziness and no ear surgery.  Ringing in Ears:    Associated symptoms: tinnitus (bilatearl non-pulsatile).  No dizziness, no vertigo, no fever, no headaches, no aural fullness, no otalgia and no facial weakness.No dizziness and no ear surgery.    Review of Systems   Constitutional: Negative for chills, fever and unexpected weight change.   HENT: Positive for hearing loss and tinnitus (bilatearl non-pulsatile). Negative for sore throat and trouble swallowing.    Eyes: Negative for pain and visual disturbance.   Respiratory: Negative for apnea and shortness of breath.    Cardiovascular: Negative for chest pain and palpitations.   Gastrointestinal: Negative for abdominal pain and nausea.   Endocrine: Negative for cold intolerance and heat intolerance.   Musculoskeletal: Negative for joint swelling and neck stiffness.   Skin: Negative for color change and rash.   Neurological: Negative for dizziness, vertigo, facial asymmetry and headaches.   Hematological: Negative for adenopathy. Does not bruise/bleed easily.   Psychiatric/Behavioral: Negative for agitation. The patient is not nervous/anxious.        Objective:      Physical Exam   Constitutional: She is oriented to person, place, and time. She appears well-developed and well-nourished. No distress.   HENT:   Head: Normocephalic and atraumatic.   Right Ear: Tympanic membrane, external ear and ear canal normal.   Left Ear: Tympanic membrane, external ear and ear canal normal.   Nose: Nose normal.   Mouth/Throat: Uvula is midline, oropharynx  is clear and moist and mucous membranes are normal.   Garcias: Midline  Rinne: AC > BC @ 512Hz   Eyes: Pupils are equal, round, and reactive to light. Conjunctivae and EOM are normal.   Neck: Normal range of motion. No tracheal deviation present. No thyromegaly present.   Cardiovascular: Normal rate and regular rhythm.   Pulmonary/Chest: Effort normal. No respiratory distress.   Musculoskeletal: Normal range of motion.   Lymphadenopathy:        Head (right side): No submental, no submandibular and no tonsillar adenopathy present.        Head (left side): No submental, no submandibular and no tonsillar adenopathy present.     She has no cervical adenopathy.   Neurological: She is alert and oriented to person, place, and time.   Psychiatric: She has a normal mood and affect. Her behavior is normal.   Nursing note and vitals reviewed.      Data reviewed:  Audiogram revealed normal sloping to severe SNHL that was worse in the right ear than the left.    MRI brain images  independently reviewed by me from 1/2017.  No evidence of IAC or inner ear anomaly, no evidence of acoustic neuroma.  This is however a non-contrasted exam.      Assessment:       1. Asymmetrical right sensorineural hearing loss    2. Sensorineural hearing loss, bilateral    3. Bilateral high frequency sensorineural hearing loss    4. Tinnitus aurium, bilateral        Plan:         In summary this is a pleasant 79 y.o. with sloping SNHL and non-localizing tinnitus. She has  A slight asymmetry of the right ear, noted on audiogram however the patient was unaware of the difference.  Her MRI from 2017 does not show any evidence of AN. We will plan to monitor at this time with repeat audiogram in 1 year.

## 2019-09-05 RX ORDER — AMLODIPINE BESYLATE 10 MG/1
TABLET ORAL
Qty: 90 TABLET | Refills: 3 | Status: SHIPPED | OUTPATIENT
Start: 2019-09-05 | End: 2020-07-28

## 2019-10-23 RX ORDER — ATORVASTATIN CALCIUM 40 MG/1
TABLET, FILM COATED ORAL
Qty: 90 TABLET | Refills: 3 | Status: SHIPPED | OUTPATIENT
Start: 2019-10-23 | End: 2020-10-21 | Stop reason: SDUPTHER

## 2019-10-23 RX ORDER — METFORMIN HYDROCHLORIDE 500 MG/1
TABLET ORAL
Qty: 90 TABLET | Refills: 3 | Status: SHIPPED | OUTPATIENT
Start: 2019-10-23 | End: 2020-06-29 | Stop reason: SDUPTHER

## 2019-12-07 RX ORDER — METOPROLOL TARTRATE 25 MG/1
TABLET, FILM COATED ORAL
Qty: 180 TABLET | Refills: 3 | Status: SHIPPED | OUTPATIENT
Start: 2019-12-07 | End: 2020-10-21 | Stop reason: SDUPTHER

## 2020-01-28 ENCOUNTER — TELEPHONE (OUTPATIENT)
Dept: INTERNAL MEDICINE | Facility: CLINIC | Age: 80
End: 2020-01-28

## 2020-01-28 DIAGNOSIS — M85.80 OSTEOPENIA, UNSPECIFIED LOCATION: ICD-10-CM

## 2020-01-28 DIAGNOSIS — E11.29 TYPE 2 DIABETES MELLITUS WITH MICROALBUMINURIA, WITHOUT LONG-TERM CURRENT USE OF INSULIN: Primary | ICD-10-CM

## 2020-01-28 DIAGNOSIS — E04.2 NONTOXIC MULTINODULAR GOITER: ICD-10-CM

## 2020-01-28 DIAGNOSIS — E11.69 HYPERLIPIDEMIA ASSOCIATED WITH TYPE 2 DIABETES MELLITUS: ICD-10-CM

## 2020-01-28 DIAGNOSIS — I15.2 HYPERTENSION ASSOCIATED WITH DIABETES: ICD-10-CM

## 2020-01-28 DIAGNOSIS — R80.9 TYPE 2 DIABETES MELLITUS WITH MICROALBUMINURIA, WITHOUT LONG-TERM CURRENT USE OF INSULIN: Primary | ICD-10-CM

## 2020-01-28 DIAGNOSIS — E11.59 HYPERTENSION ASSOCIATED WITH DIABETES: ICD-10-CM

## 2020-01-28 DIAGNOSIS — E78.5 HYPERLIPIDEMIA ASSOCIATED WITH TYPE 2 DIABETES MELLITUS: ICD-10-CM

## 2020-01-28 NOTE — TELEPHONE ENCOUNTER
----- Message from Fiorella Fuentes sent at 1/28/2020 11:15 AM CST -----  Contact: [phone]  Doctor appointment and lab have been scheduled.  Please link lab orders to the lab appointment.  Date of doctor appointment:  06-  Date of lab appointment:  06-  Physical or EP:   Physical  Comments:   Please advise, thank you.

## 2020-02-12 ENCOUNTER — TELEPHONE (OUTPATIENT)
Dept: INTERNAL MEDICINE | Facility: CLINIC | Age: 80
End: 2020-02-12

## 2020-02-12 NOTE — TELEPHONE ENCOUNTER
Spoke to pt. Pt advised that a PES call went out to her. Our office had not called.  Pt stated that she needed to schedule a preop for her cataract surgery.  Pt scheduled for 2/24/20 at 1:40 PM.

## 2020-02-12 NOTE — TELEPHONE ENCOUNTER
----- Message from Mona Dunbar sent at 2/12/2020  8:43 AM CST -----  Contact: pt  Pt missed a call and would the nurse to return their call.    Pt can be reached at [phone]

## 2020-02-21 NOTE — PROGRESS NOTES
80 y.o. female w/ HTN, HLD, CKD 3,DM, AS, and thyroid ds presents for preop  Surgery: cataract  Surgeon: Dr. Cisneros  Date: 3/2020    DM , tx metformin 500mg qd  Denied increased thirst or urination  A1C==> 6.8    HTN, tx HCTZ 50mg, amlodipine 10mg, metoprolol 25mg   CKD 3 ( eGFR==>54.1)  Denied HA or dizziness   BP==> 136/64    HLD, tx atorvastatin  Carotid ds, asx stroke  Aortic atherosclerosis, no claudication         ROS:  No fever, chills , or night sweats  No visual disturbance or d/c  No ear or sinus pressure or pain  No sore throat or dysphagia  No cough or wheezing  No chest pain or palpitations  No GERD or abdominal pain  No diarrhea or blood in stool  ++ dysuria w/o hematuria  No cold or heat intolerance  No increased thirst or urination  No HA or focal deficits  No skin rashes or lesions  No unusual bruising or bleeding or clotting  No problem w/ anesthesia in the past  Remainder of review negative except as previously noted    PMHX: Reviewed  PSHX:Reviewed  SHX:Reviewed  FHX: Reviewed    PE:  VSS:  GEN:WDWN, A&O, NAD, conversant and co-operative  EYES: Conj/lids unremarkable, sclera anicteric, pupils reactive  ENT: Canals free of cerumen, TM's unremarkable; nasal mucosa/turbinates w/o exudate or edema; o/p w/o exudate or erythema; sinus nontender  NECK: Supple w/o lymphadenopathy or thyromegaly  RESPIRATORY: Efforts unlabored; lungs CTA  CARDIOVASCULAR: Heart RRR, no carotid bruits noted, 1+ pedal pulses, no edema  GASTROINTESTINAL: BS+, soft, NT/ND, - HSM noted  MUSCULOSKELETAL: Gait normal, no CCE  NEUROLOGIC:VIEIRA. No tremor noted  SKIN: WArm and dry    IMPRESSION:  Cataract  Dysuria, started several days ago w/ strong odor  TYpe 2 DM, asx  HTN assoc DM, stable  CKD 3 assoc DM, asx  HLD assoc DM,asx  Carotid AA, asx  Aortic atherosclerosis, asx          PLAN:  Urine culture  Continue present meds  BP meds w/ a sip of water  AM of surgery    Dr. Whitten     Office will be advised that the optimization  has been completed  Pt to take paperwork- no fax # provided  Copy to be placed in chart  No contraindication to anesthesia and surgery noted @ this time  Call prn  RTC 6/2020 as scheduled

## 2020-02-24 ENCOUNTER — OFFICE VISIT (OUTPATIENT)
Dept: INTERNAL MEDICINE | Facility: CLINIC | Age: 80
End: 2020-02-24
Payer: MEDICARE

## 2020-02-24 VITALS
TEMPERATURE: 99 F | SYSTOLIC BLOOD PRESSURE: 136 MMHG | WEIGHT: 183.63 LBS | RESPIRATION RATE: 18 BRPM | HEIGHT: 66 IN | BODY MASS INDEX: 29.51 KG/M2 | DIASTOLIC BLOOD PRESSURE: 64 MMHG | HEART RATE: 65 BPM

## 2020-02-24 DIAGNOSIS — N18.30 CKD STAGE 3 SECONDARY TO DIABETES: ICD-10-CM

## 2020-02-24 DIAGNOSIS — I70.0 AORTIC ATHEROSCLEROSIS: ICD-10-CM

## 2020-02-24 DIAGNOSIS — E11.22 CKD STAGE 3 SECONDARY TO DIABETES: ICD-10-CM

## 2020-02-24 DIAGNOSIS — E11.69 HYPERLIPIDEMIA ASSOCIATED WITH TYPE 2 DIABETES MELLITUS: ICD-10-CM

## 2020-02-24 DIAGNOSIS — E11.59 HYPERTENSION ASSOCIATED WITH DIABETES: ICD-10-CM

## 2020-02-24 DIAGNOSIS — E78.5 HYPERLIPIDEMIA ASSOCIATED WITH TYPE 2 DIABETES MELLITUS: ICD-10-CM

## 2020-02-24 DIAGNOSIS — E11.29 TYPE 2 DIABETES MELLITUS WITH MICROALBUMINURIA, WITHOUT LONG-TERM CURRENT USE OF INSULIN: ICD-10-CM

## 2020-02-24 DIAGNOSIS — R80.9 TYPE 2 DIABETES MELLITUS WITH MICROALBUMINURIA, WITHOUT LONG-TERM CURRENT USE OF INSULIN: ICD-10-CM

## 2020-02-24 DIAGNOSIS — R30.0 DYSURIA: ICD-10-CM

## 2020-02-24 DIAGNOSIS — I15.2 HYPERTENSION ASSOCIATED WITH DIABETES: ICD-10-CM

## 2020-02-24 DIAGNOSIS — H26.9 CATARACT, UNSPECIFIED CATARACT TYPE, UNSPECIFIED LATERALITY: Primary | ICD-10-CM

## 2020-02-24 DIAGNOSIS — I65.23 BILATERAL CAROTID ARTERY STENOSIS: ICD-10-CM

## 2020-02-24 PROCEDURE — 99499 UNLISTED E&M SERVICE: CPT | Mod: HCNC,S$GLB,, | Performed by: INTERNAL MEDICINE

## 2020-02-24 PROCEDURE — 3078F DIAST BP <80 MM HG: CPT | Mod: HCNC,CPTII,S$GLB, | Performed by: INTERNAL MEDICINE

## 2020-02-24 PROCEDURE — 3078F PR MOST RECENT DIASTOLIC BLOOD PRESSURE < 80 MM HG: ICD-10-PCS | Mod: HCNC,CPTII,S$GLB, | Performed by: INTERNAL MEDICINE

## 2020-02-24 PROCEDURE — 1101F PT FALLS ASSESS-DOCD LE1/YR: CPT | Mod: HCNC,CPTII,S$GLB, | Performed by: INTERNAL MEDICINE

## 2020-02-24 PROCEDURE — 99214 OFFICE O/P EST MOD 30 MIN: CPT | Mod: HCNC,S$GLB,, | Performed by: INTERNAL MEDICINE

## 2020-02-24 PROCEDURE — 99999 PR PBB SHADOW E&M-EST. PATIENT-LVL III: CPT | Mod: PBBFAC,HCNC,, | Performed by: INTERNAL MEDICINE

## 2020-02-24 PROCEDURE — 3075F PR MOST RECENT SYSTOLIC BLOOD PRESS GE 130-139MM HG: ICD-10-PCS | Mod: HCNC,CPTII,S$GLB, | Performed by: INTERNAL MEDICINE

## 2020-02-24 PROCEDURE — 99499 RISK ADDL DX/OHS AUDIT: ICD-10-PCS | Mod: HCNC,S$GLB,, | Performed by: INTERNAL MEDICINE

## 2020-02-24 PROCEDURE — 1159F PR MEDICATION LIST DOCUMENTED IN MEDICAL RECORD: ICD-10-PCS | Mod: HCNC,S$GLB,, | Performed by: INTERNAL MEDICINE

## 2020-02-24 PROCEDURE — 99214 PR OFFICE/OUTPT VISIT, EST, LEVL IV, 30-39 MIN: ICD-10-PCS | Mod: HCNC,S$GLB,, | Performed by: INTERNAL MEDICINE

## 2020-02-24 PROCEDURE — 1159F MED LIST DOCD IN RCRD: CPT | Mod: HCNC,S$GLB,, | Performed by: INTERNAL MEDICINE

## 2020-02-24 PROCEDURE — 1101F PR PT FALLS ASSESS DOC 0-1 FALLS W/OUT INJ PAST YR: ICD-10-PCS | Mod: HCNC,CPTII,S$GLB, | Performed by: INTERNAL MEDICINE

## 2020-02-24 PROCEDURE — 3075F SYST BP GE 130 - 139MM HG: CPT | Mod: HCNC,CPTII,S$GLB, | Performed by: INTERNAL MEDICINE

## 2020-02-24 PROCEDURE — 1126F AMNT PAIN NOTED NONE PRSNT: CPT | Mod: HCNC,S$GLB,, | Performed by: INTERNAL MEDICINE

## 2020-02-24 PROCEDURE — 1126F PR PAIN SEVERITY QUANTIFIED, NO PAIN PRESENT: ICD-10-PCS | Mod: HCNC,S$GLB,, | Performed by: INTERNAL MEDICINE

## 2020-02-24 PROCEDURE — 99999 PR PBB SHADOW E&M-EST. PATIENT-LVL III: ICD-10-PCS | Mod: PBBFAC,HCNC,, | Performed by: INTERNAL MEDICINE

## 2020-02-24 RX ORDER — FENOFIBRATE 134 MG/1
CAPSULE ORAL
COMMUNITY
End: 2020-02-24 | Stop reason: SDUPTHER

## 2020-02-24 RX ORDER — ATORVASTATIN CALCIUM 40 MG/1
TABLET, FILM COATED ORAL
COMMUNITY
End: 2020-02-24 | Stop reason: SDUPTHER

## 2020-02-24 RX ORDER — LOSARTAN POTASSIUM 25 MG/1
TABLET ORAL
COMMUNITY
Start: 2020-02-07 | End: 2020-02-24 | Stop reason: DRUGHIGH

## 2020-02-24 RX ORDER — HYDROCHLOROTHIAZIDE 25 MG/1
TABLET ORAL
COMMUNITY
End: 2020-06-29 | Stop reason: DRUGHIGH

## 2020-02-24 RX ORDER — BIOTIN 1 MG
1000 TABLET ORAL 3 TIMES DAILY
COMMUNITY
End: 2021-04-26

## 2020-02-24 RX ORDER — METOPROLOL TARTRATE 25 MG/1
TABLET, FILM COATED ORAL
COMMUNITY
End: 2020-02-24 | Stop reason: SDUPTHER

## 2020-02-24 RX ORDER — TRAMADOL HYDROCHLORIDE 50 MG/1
TABLET ORAL
COMMUNITY
End: 2020-02-24

## 2020-02-24 RX ORDER — METFORMIN HYDROCHLORIDE 500 MG/1
TABLET ORAL
COMMUNITY
End: 2020-02-24 | Stop reason: SDUPTHER

## 2020-02-24 RX ORDER — AMLODIPINE BESYLATE 10 MG/1
TABLET ORAL
COMMUNITY
End: 2020-02-24 | Stop reason: SDUPTHER

## 2020-02-24 RX ORDER — CHOLECALCIFEROL (VITAMIN D3) 25 MCG
TABLET ORAL
COMMUNITY
End: 2020-02-24 | Stop reason: SDUPTHER

## 2020-03-03 ENCOUNTER — PES CALL (OUTPATIENT)
Dept: ADMINISTRATIVE | Facility: CLINIC | Age: 80
End: 2020-03-03

## 2020-03-03 RX ORDER — FENOFIBRATE 134 MG/1
CAPSULE ORAL
Qty: 90 CAPSULE | Refills: 3 | Status: SHIPPED | OUTPATIENT
Start: 2020-03-03 | End: 2021-02-27

## 2020-03-16 ENCOUNTER — PES CALL (OUTPATIENT)
Dept: ADMINISTRATIVE | Facility: CLINIC | Age: 80
End: 2020-03-16

## 2020-04-07 ENCOUNTER — LAB VISIT (OUTPATIENT)
Dept: LAB | Facility: HOSPITAL | Age: 80
End: 2020-04-07
Attending: INTERNAL MEDICINE
Payer: MEDICARE

## 2020-04-07 ENCOUNTER — TELEPHONE (OUTPATIENT)
Dept: INTERNAL MEDICINE | Facility: CLINIC | Age: 80
End: 2020-04-07

## 2020-04-07 DIAGNOSIS — R82.90 MALODOROUS URINE: ICD-10-CM

## 2020-04-07 DIAGNOSIS — R30.0 DYSURIA: Primary | ICD-10-CM

## 2020-04-07 DIAGNOSIS — R30.0 DYSURIA: ICD-10-CM

## 2020-04-07 PROCEDURE — 87186 SC STD MICRODIL/AGAR DIL: CPT | Mod: HCNC

## 2020-04-07 PROCEDURE — 87077 CULTURE AEROBIC IDENTIFY: CPT | Mod: HCNC

## 2020-04-07 PROCEDURE — 87088 URINE BACTERIA CULTURE: CPT | Mod: HCNC

## 2020-04-07 PROCEDURE — 87086 URINE CULTURE/COLONY COUNT: CPT | Mod: HCNC

## 2020-04-07 RX ORDER — AMPICILLIN 500 MG/1
500 CAPSULE ORAL 3 TIMES DAILY
Qty: 21 CAPSULE | Refills: 0 | Status: SHIPPED | OUTPATIENT
Start: 2020-04-07 | End: 2020-07-01 | Stop reason: SDUPTHER

## 2020-04-07 NOTE — TELEPHONE ENCOUNTER
Spoke to pt. Pt stated she has discomfort urinating and that her urine smells bad. Pt stated that she had been given a rx for ampicillin the last time this happened in June. She stated that she would like another rx.  I advised that we will need a rx to collect it.   Pt stated that she can go to the [facility] lab now.  Urine culture ordered and scheduled.    Order ampicillin now? Wait for culture?  Pharmacy queued.

## 2020-04-07 NOTE — TELEPHONE ENCOUNTER
----- Message from Rubia Conley sent at 4/7/2020  1:12 PM CDT -----  Contact: Patient/[phone]  Patient is returning a phone call.  Who left a message for the patient: Promise  Does patient know what this is regarding:    Comments: Patient is back from Lab

## 2020-04-07 NOTE — TELEPHONE ENCOUNTER
Spoke to pt. Pt advised of new rx for ampicillin. I advised that we will need the urine first before she can start it.  Pt stated that she is on her way to the lab.

## 2020-04-07 NOTE — TELEPHONE ENCOUNTER
----- Message from Chris Eugene sent at 4/7/2020 10:25 AM CDT -----  Contact: Pt [phone]  Would like to get medical advice.  Symptoms (please be specific):  Odor to urine, burning while urinating  How long has patient had these symptoms:  2 weeks  Pharmacy name and phone #:  [facility] [id number] - [id number] [facility] [phone] (Phone) [phone] (Fax)

## 2020-04-09 LAB — BACTERIA UR CULT: ABNORMAL

## 2020-04-11 RX ORDER — SULFAMETHOXAZOLE AND TRIMETHOPRIM 800; 160 MG/1; MG/1
1 TABLET ORAL 2 TIMES DAILY
Qty: 10 TABLET | Refills: 0 | Status: SHIPPED | OUTPATIENT
Start: 2020-04-11 | End: 2020-08-12

## 2020-04-11 NOTE — TELEPHONE ENCOUNTER
Please advise pt that her UTI is not sensitive to the  Ampicillin    Will escript Bactrim - a sulfa antibiotic  She will need to take with food and 8oz liquid    She has [facility] listed as her local pharmacy  News said they were closing to do some deep cleaning  Not sure if that was jsut yesterday or if included today  She may want to give a backup pharmacy that she knows is open  Or check to see if the [facility] pharmacy is open today

## 2020-04-27 RX ORDER — HYDROCHLOROTHIAZIDE 50 MG/1
TABLET ORAL
Qty: 90 TABLET | Refills: 3 | Status: SHIPPED | OUTPATIENT
Start: 2020-04-27 | End: 2021-04-16 | Stop reason: SDUPTHER

## 2020-04-27 RX ORDER — LOSARTAN POTASSIUM 25 MG/1
TABLET ORAL
Qty: 90 TABLET | Refills: 6 | Status: SHIPPED | OUTPATIENT
Start: 2020-04-27 | End: 2020-08-12 | Stop reason: DRUGHIGH

## 2020-05-18 ENCOUNTER — TELEPHONE (OUTPATIENT)
Dept: INTERNAL MEDICINE | Facility: CLINIC | Age: 80
End: 2020-05-18

## 2020-05-18 DIAGNOSIS — Z12.31 VISIT FOR SCREENING MAMMOGRAM: Primary | ICD-10-CM

## 2020-05-18 NOTE — TELEPHONE ENCOUNTER
----- Message from Donte North sent at 5/18/2020  2:44 PM CDT -----  Contact: Patient [phone]  Type: Orders Request    What orders/ testing are being requested? Mammo Gram     Is there a future appointment scheduled for the patient with PCP? Yes     When? 6/29/20    Comments: 4/24/19 last testing, call to inform ready to schedule.    Please call an advise  Thank you

## 2020-05-21 ENCOUNTER — HOSPITAL ENCOUNTER (OUTPATIENT)
Dept: RADIOLOGY | Facility: HOSPITAL | Age: 80
Discharge: HOME | End: 2020-05-21
Attending: INTERNAL MEDICINE
Payer: MEDICARE

## 2020-05-21 ENCOUNTER — TELEPHONE (OUTPATIENT)
Dept: INTERNAL MEDICINE | Facility: CLINIC | Age: 80
End: 2020-05-21

## 2020-05-21 DIAGNOSIS — Z12.31 VISIT FOR SCREENING MAMMOGRAM: ICD-10-CM

## 2020-05-21 PROCEDURE — 77063 MAMMO DIGITAL SCREENING BILAT WITH TOMOSYNTHESIS_CAD: ICD-10-PCS | Mod: 26,HCNC,, | Performed by: RADIOLOGY

## 2020-05-21 PROCEDURE — 77067 MAMMO DIGITAL SCREENING BILAT WITH TOMOSYNTHESIS_CAD: ICD-10-PCS | Mod: 26,HCNC,, | Performed by: RADIOLOGY

## 2020-05-21 PROCEDURE — 77067 SCR MAMMO BI INCL CAD: CPT | Mod: 26,HCNC,, | Performed by: RADIOLOGY

## 2020-05-21 PROCEDURE — 77063 BREAST TOMOSYNTHESIS BI: CPT | Mod: 26,HCNC,, | Performed by: RADIOLOGY

## 2020-05-21 PROCEDURE — 77067 SCR MAMMO BI INCL CAD: CPT | Mod: TC,HCNC,PO

## 2020-05-21 NOTE — TELEPHONE ENCOUNTER
----- Message from Karina Wilson MD sent at 5/21/2020  3:15 PM CDT -----  Please note that your mammogram was read as follows  Impression:  There is no mammographic evidence of malignancy.  Karina Cabrales

## 2020-06-19 ENCOUNTER — OFFICE VISIT (OUTPATIENT)
Dept: HOME HEALTH SERVICES | Facility: CLINIC | Age: 80
End: 2020-06-19
Payer: MEDICARE

## 2020-06-19 VITALS
DIASTOLIC BLOOD PRESSURE: 67 MMHG | HEIGHT: 66 IN | HEART RATE: 69 BPM | SYSTOLIC BLOOD PRESSURE: 131 MMHG | WEIGHT: 178 LBS | BODY MASS INDEX: 28.61 KG/M2

## 2020-06-19 DIAGNOSIS — E66.9 OBESITY, DIABETES, AND HYPERTENSION SYNDROME: ICD-10-CM

## 2020-06-19 DIAGNOSIS — E11.59 TYPE 2 DIABETES MELLITUS WITH OTHER CIRCULATORY COMPLICATION, WITHOUT LONG-TERM CURRENT USE OF INSULIN: ICD-10-CM

## 2020-06-19 DIAGNOSIS — E11.69 OBESITY, DIABETES, AND HYPERTENSION SYNDROME: ICD-10-CM

## 2020-06-19 DIAGNOSIS — Z95.2 S/P AVR (AORTIC VALVE REPLACEMENT): ICD-10-CM

## 2020-06-19 DIAGNOSIS — E11.22 CKD STAGE 3 SECONDARY TO DIABETES: ICD-10-CM

## 2020-06-19 DIAGNOSIS — K21.9 GASTROESOPHAGEAL REFLUX DISEASE WITHOUT ESOPHAGITIS: ICD-10-CM

## 2020-06-19 DIAGNOSIS — R80.9 TYPE 2 DIABETES MELLITUS WITH MICROALBUMINURIA, WITHOUT LONG-TERM CURRENT USE OF INSULIN: ICD-10-CM

## 2020-06-19 DIAGNOSIS — E11.69 HYPERLIPIDEMIA ASSOCIATED WITH TYPE 2 DIABETES MELLITUS: ICD-10-CM

## 2020-06-19 DIAGNOSIS — I15.2 HYPERTENSION ASSOCIATED WITH DIABETES: ICD-10-CM

## 2020-06-19 DIAGNOSIS — E11.9 DM TYPE 2 WITHOUT RETINOPATHY: ICD-10-CM

## 2020-06-19 DIAGNOSIS — I15.2 OBESITY, DIABETES, AND HYPERTENSION SYNDROME: ICD-10-CM

## 2020-06-19 DIAGNOSIS — I70.0 AORTIC ATHEROSCLEROSIS: ICD-10-CM

## 2020-06-19 DIAGNOSIS — E11.29 TYPE 2 DIABETES MELLITUS WITH MICROALBUMINURIA, WITHOUT LONG-TERM CURRENT USE OF INSULIN: ICD-10-CM

## 2020-06-19 DIAGNOSIS — E66.3 OVERWEIGHT (BMI 25.0-29.9): ICD-10-CM

## 2020-06-19 DIAGNOSIS — I51.89 LEFT VENTRICULAR DIASTOLIC DYSFUNCTION WITH PRESERVED SYSTOLIC FUNCTION: ICD-10-CM

## 2020-06-19 DIAGNOSIS — E04.2 NONTOXIC MULTINODULAR GOITER: ICD-10-CM

## 2020-06-19 DIAGNOSIS — I35.0 NONRHEUMATIC AORTIC VALVE STENOSIS: ICD-10-CM

## 2020-06-19 DIAGNOSIS — E78.5 HYPERLIPIDEMIA ASSOCIATED WITH TYPE 2 DIABETES MELLITUS: ICD-10-CM

## 2020-06-19 DIAGNOSIS — M85.80 OSTEOPENIA, UNSPECIFIED LOCATION: ICD-10-CM

## 2020-06-19 DIAGNOSIS — H90.3 SENSORINEURAL HEARING LOSS (SNHL), BILATERAL: ICD-10-CM

## 2020-06-19 DIAGNOSIS — Z00.00 ENCOUNTER FOR PREVENTIVE HEALTH EXAMINATION: Primary | ICD-10-CM

## 2020-06-19 DIAGNOSIS — I65.23 BILATERAL CAROTID ARTERY STENOSIS: ICD-10-CM

## 2020-06-19 DIAGNOSIS — N18.30 CKD STAGE 3 SECONDARY TO DIABETES: ICD-10-CM

## 2020-06-19 DIAGNOSIS — E11.59 HYPERTENSION ASSOCIATED WITH DIABETES: ICD-10-CM

## 2020-06-19 DIAGNOSIS — E11.59 OBESITY, DIABETES, AND HYPERTENSION SYNDROME: ICD-10-CM

## 2020-06-19 PROCEDURE — 3078F PR MOST RECENT DIASTOLIC BLOOD PRESSURE < 80 MM HG: ICD-10-PCS | Mod: CPTII,S$GLB,, | Performed by: NURSE PRACTITIONER

## 2020-06-19 PROCEDURE — 3075F PR MOST RECENT SYSTOLIC BLOOD PRESS GE 130-139MM HG: ICD-10-PCS | Mod: CPTII,S$GLB,, | Performed by: NURSE PRACTITIONER

## 2020-06-19 PROCEDURE — 3078F DIAST BP <80 MM HG: CPT | Mod: CPTII,S$GLB,, | Performed by: NURSE PRACTITIONER

## 2020-06-19 PROCEDURE — G0439 PR MEDICARE ANNUAL WELLNESS SUBSEQUENT VISIT: ICD-10-PCS | Mod: S$GLB,,, | Performed by: NURSE PRACTITIONER

## 2020-06-19 PROCEDURE — 3075F SYST BP GE 130 - 139MM HG: CPT | Mod: CPTII,S$GLB,, | Performed by: NURSE PRACTITIONER

## 2020-06-19 PROCEDURE — G0439 PPPS, SUBSEQ VISIT: HCPCS | Mod: S$GLB,,, | Performed by: NURSE PRACTITIONER

## 2020-06-19 NOTE — PROGRESS NOTES
"Zaida Liu presented for a  Medicare AWV and comprehensive Health Risk Assessment today. The following components were reviewed and updated:    · Medical history  · Family History  · Social history  · Allergies and Current Medications  · Health Risk Assessment  · Health Maintenance  · Care Team     ** See Completed Assessments for Annual Wellness Visit within the encounter summary.**       The following assessments were completed:  · Living Situation  · CAGE  · Depression Screening  · Timed Get Up and Go  · Whisper Test  · Cognitive Function Screening  ·   ·   ·   · Nutrition Screening  · ADL Screening  · PAQ Screening    Vitals:    06/19/20 1013   BP: 131/67   Pulse: 69   Weight: 80.7 kg (178 lb)   Height: 5' 6" (1.676 m)     Body mass index is 28.73 kg/m².  Physical Exam  Constitutional:       Appearance: Normal appearance.   HENT:      Head: Normocephalic and atraumatic.   Eyes:      Extraocular Movements: Extraocular movements intact.      Pupils: Pupils are equal, round, and reactive to light.   Cardiovascular:      Rate and Rhythm: Normal rate and regular rhythm.      Heart sounds: Normal heart sounds.   Pulmonary:      Effort: Pulmonary effort is normal. No respiratory distress.      Breath sounds: Normal breath sounds.   Abdominal:      General: Bowel sounds are normal.      Palpations: Abdomen is soft.   Musculoskeletal: Normal range of motion.         General: No swelling.   Skin:     General: Skin is warm and dry.   Neurological:      General: No focal deficit present.      Mental Status: She is alert and oriented to person, place, and time.   Psychiatric:         Mood and Affect: Mood normal.         Behavior: Behavior normal.           Diagnoses and health risks identified today and associated recommendations/orders:    1. Encounter for preventive health examination  Assessment completed. Preventive measures reviewed with patient.    2. Aortic atherosclerosis  Stable, followed by Cardiology.    3. " Nonrheumatic aortic valve stenosis  Stable, followed by Cardiology.    4. Bilateral carotid artery stenosis  Stable, followed by Cardiology.    5. Hyperlipidemia associated with type 2 diabetes mellitus  Stable, followed by PCP.    6. Hypertension associated with diabetes  Stable, followed by PCP.    7. Left ventricular diastolic dysfunction with preserved systolic function  Stable, followed by Cardiology.    8. S/P AVR (aortic valve replacement)  Stable, followed by Cardiology.    9. CKD stage 3 secondary to diabetes  Stable, followed by PCP.    10. DM type 2 without retinopathy  Stable, followed by PCP.    11. Nontoxic multinodular goiter  Stable, followed by PCP and Endocriniology.    12. Obesity, diabetes, and hypertension syndrome  Stable, followed by PCP.    13. Overweight (BMI 25.0-29.9)  Stable, followed by PCP.    14. Type 2 diabetes mellitus with other circulatory complication, without long-term current use of insulin  Stable, followed by PCP.    15. Type 2 diabetes mellitus with microalbuminuria, without long-term current use of insulin  Stable, followed by PCP.    16. Gastroesophageal reflux disease without esophagitis  Stable, followed by PCP.    17. Osteopenia, unspecified location  Stable, followed by PCP.    18. Sensorineural hearing loss (SNHL), bilateral  Stable, followed by Otolaryngology.    Provided Anise with a 5-10 year written screening schedule and personal prevention plan. Recommendations were developed using the USPSTF age appropriate recommendations. Education, counseling, and referrals were provided as needed. After Visit Summary printed and given to patient which includes a list of additional screenings\tests needed.    No follow-ups on file.    Linda Ruiz NP    I offered to discuss end of life issues, including information on how to make advance directives that the patient could use to name someone who would make medical decisions on their behalf if they became too ill to make  themselves.    ___Patient declined  _X_Patient is interested, I provided paper work and offered to discuss.

## 2020-06-19 NOTE — PATIENT INSTRUCTIONS
Counseling and Referral of Other Preventative  (Italic type indicates deductible and co-insurance are waived)    Patient Name: Zaida Liu  Today's Date: 6/19/2020    Health Maintenance       Date Due Completion Date    Shingles Vaccine (2 of 3) 06/09/2011 4/14/2011    Foot Exam 12/18/2019 12/18/2018 (Done)    Override on 12/18/2018: Done    Override on 10/2/2017: Done    Override on 10/4/2016: Done    Override on 10/26/2015: Done    Hemoglobin A1c 12/18/2019 6/18/2019    Override on 12/18/2018: Done    Lipid Panel 06/18/2020 6/18/2019    Eye Exam 03/12/2021 3/12/2020    Override on 10/26/2015: Done (12/2014 Dr. Lydia Ragland, 12/2015 o/v pending)    DEXA SCAN 05/21/2021 5/21/2018    Aspirin/Antiplatelet Therapy 06/19/2021 6/19/2020    TETANUS VACCINE 02/18/2024 2/18/2014        No orders of the defined types were placed in this encounter.    The following information is provided to all patients.  This information is to help you find resources for any of the problems found today that may be affecting your health:                Living healthy guide: www.[facility].[place].gov      Understanding Diabetes: www.diabetes.org      Eating healthy: www.cdc.gov/healthyweight      [facility] home safety checklist: www.cdc.gov/steadi/patient.html      Agency on Aging: www.goea.[place].[facility]      Alcoholics anonymous (AA): www.aa.org      Physical Activity: www.juan.nih.gov/ad5ulxx      Tobacco use: www.quitwithusla.org

## 2020-06-21 PROBLEM — H90.3 SENSORINEURAL HEARING LOSS (SNHL), BILATERAL: Status: ACTIVE | Noted: 2020-06-21

## 2020-06-22 ENCOUNTER — LAB VISIT (OUTPATIENT)
Dept: LAB | Facility: HOSPITAL | Age: 80
End: 2020-06-22
Attending: INTERNAL MEDICINE
Payer: MEDICARE

## 2020-06-22 DIAGNOSIS — E11.29 TYPE 2 DIABETES MELLITUS WITH MICROALBUMINURIA, WITHOUT LONG-TERM CURRENT USE OF INSULIN: ICD-10-CM

## 2020-06-22 DIAGNOSIS — R80.9 TYPE 2 DIABETES MELLITUS WITH MICROALBUMINURIA, WITHOUT LONG-TERM CURRENT USE OF INSULIN: ICD-10-CM

## 2020-06-22 DIAGNOSIS — I15.2 HYPERTENSION ASSOCIATED WITH DIABETES: ICD-10-CM

## 2020-06-22 DIAGNOSIS — M85.80 OSTEOPENIA, UNSPECIFIED LOCATION: ICD-10-CM

## 2020-06-22 DIAGNOSIS — E78.5 HYPERLIPIDEMIA ASSOCIATED WITH TYPE 2 DIABETES MELLITUS: ICD-10-CM

## 2020-06-22 DIAGNOSIS — E11.69 HYPERLIPIDEMIA ASSOCIATED WITH TYPE 2 DIABETES MELLITUS: ICD-10-CM

## 2020-06-22 DIAGNOSIS — E04.2 NONTOXIC MULTINODULAR GOITER: ICD-10-CM

## 2020-06-22 DIAGNOSIS — E11.59 HYPERTENSION ASSOCIATED WITH DIABETES: ICD-10-CM

## 2020-06-22 LAB
25(OH)D3+25(OH)D2 SERPL-MCNC: 40 NG/ML (ref 30–96)
ALBUMIN SERPL BCP-MCNC: 3.9 G/DL (ref 3.5–5.2)
ALP SERPL-CCNC: 51 U/L (ref 55–135)
ALT SERPL W/O P-5'-P-CCNC: 36 U/L (ref 10–44)
ANION GAP SERPL CALC-SCNC: 12 MMOL/L (ref 8–16)
AST SERPL-CCNC: 27 U/L (ref 10–40)
BASOPHILS # BLD AUTO: 0.06 K/UL (ref 0–0.2)
BASOPHILS NFR BLD: 0.8 % (ref 0–1.9)
BILIRUB SERPL-MCNC: 0.5 MG/DL (ref 0.1–1)
BUN SERPL-MCNC: 25 MG/DL (ref 8–23)
CALCIUM SERPL-MCNC: 9.4 MG/DL (ref 8.7–10.5)
CHLORIDE SERPL-SCNC: 105 MMOL/L (ref 95–110)
CHOLEST SERPL-MCNC: 208 MG/DL (ref 120–199)
CHOLEST/HDLC SERPL: 4.6 {RATIO} (ref 2–5)
CO2 SERPL-SCNC: 23 MMOL/L (ref 23–29)
CREAT SERPL-MCNC: 1 MG/DL (ref 0.5–1.4)
DIFFERENTIAL METHOD: ABNORMAL
EOSINOPHIL # BLD AUTO: 0.2 K/UL (ref 0–0.5)
EOSINOPHIL NFR BLD: 2.9 % (ref 0–8)
ERYTHROCYTE [DISTWIDTH] IN BLOOD BY AUTOMATED COUNT: 12.8 % (ref 11.5–14.5)
EST. GFR  (AFRICAN AMERICAN): >60 ML/MIN/1.73 M^2
EST. GFR  (NON AFRICAN AMERICAN): 53.7 ML/MIN/1.73 M^2
ESTIMATED AVG GLUCOSE: 171 MG/DL (ref 68–131)
GLUCOSE SERPL-MCNC: 157 MG/DL (ref 70–110)
HBA1C MFR BLD HPLC: 7.6 % (ref 4–5.6)
HCT VFR BLD AUTO: 36.9 % (ref 37–48.5)
HDLC SERPL-MCNC: 45 MG/DL (ref 40–75)
HDLC SERPL: 21.6 % (ref 20–50)
HGB BLD-MCNC: 12.2 G/DL (ref 12–16)
IMM GRANULOCYTES # BLD AUTO: 0.03 K/UL (ref 0–0.04)
IMM GRANULOCYTES NFR BLD AUTO: 0.4 % (ref 0–0.5)
LDLC SERPL CALC-MCNC: 127.8 MG/DL (ref 63–159)
LYMPHOCYTES # BLD AUTO: 2 K/UL (ref 1–4.8)
LYMPHOCYTES NFR BLD: 27.7 % (ref 18–48)
MCH RBC QN AUTO: 29.4 PG (ref 27–31)
MCHC RBC AUTO-ENTMCNC: 33.1 G/DL (ref 32–36)
MCV RBC AUTO: 89 FL (ref 82–98)
MONOCYTES # BLD AUTO: 0.6 K/UL (ref 0.3–1)
MONOCYTES NFR BLD: 7.7 % (ref 4–15)
NEUTROPHILS # BLD AUTO: 4.4 K/UL (ref 1.8–7.7)
NEUTROPHILS NFR BLD: 60.5 % (ref 38–73)
NONHDLC SERPL-MCNC: 163 MG/DL
NRBC BLD-RTO: 0 /100 WBC
PLATELET # BLD AUTO: 307 K/UL (ref 150–350)
PMV BLD AUTO: 11.5 FL (ref 9.2–12.9)
POTASSIUM SERPL-SCNC: 4.1 MMOL/L (ref 3.5–5.1)
PROT SERPL-MCNC: 7 G/DL (ref 6–8.4)
RBC # BLD AUTO: 4.15 M/UL (ref 4–5.4)
SODIUM SERPL-SCNC: 140 MMOL/L (ref 136–145)
TRIGL SERPL-MCNC: 176 MG/DL (ref 30–150)
TSH SERPL DL<=0.005 MIU/L-ACNC: 2.65 UIU/ML (ref 0.4–4)
WBC # BLD AUTO: 7.32 K/UL (ref 3.9–12.7)

## 2020-06-22 PROCEDURE — 84443 ASSAY THYROID STIM HORMONE: CPT | Mod: HCNC

## 2020-06-22 PROCEDURE — 82306 VITAMIN D 25 HYDROXY: CPT | Mod: HCNC

## 2020-06-22 PROCEDURE — 83036 HEMOGLOBIN GLYCOSYLATED A1C: CPT | Mod: HCNC

## 2020-06-22 PROCEDURE — 36415 COLL VENOUS BLD VENIPUNCTURE: CPT | Mod: HCNC,PO

## 2020-06-22 PROCEDURE — 85025 COMPLETE CBC W/AUTO DIFF WBC: CPT | Mod: HCNC

## 2020-06-22 PROCEDURE — 80053 COMPREHEN METABOLIC PANEL: CPT | Mod: HCNC

## 2020-06-22 PROCEDURE — 80061 LIPID PANEL: CPT | Mod: HCNC

## 2020-06-23 ENCOUNTER — TELEPHONE (OUTPATIENT)
Dept: INTERNAL MEDICINE | Facility: CLINIC | Age: 80
End: 2020-06-23

## 2020-06-23 NOTE — TELEPHONE ENCOUNTER
----- Message from Karina Wilson MD sent at 6/22/2020  9:16 PM CDT -----  Please review your lab work and we will discuss at your pending office visit.  Karina Cabrales

## 2020-06-27 NOTE — PROGRESS NOTES
CC: Annual PE  81 yo female presents for PE  Ex-smoker having quit a number of years ago, social alcohol consumer.                                                                                        FAMILY HISTORY:    Mom d. 85, CHF.    Dad d. 60, MI, he was alcoholic.    Older sister d. Colon cancer.  She had a blood clot.    Sister, + RA, in poor health, lung cancer s/p chemo.    Sister, s/p bypass      Brother d. 83 MI, s/p stent and a bypass.  + DM    6th /6 children                                                                                                    SCREENING TEST:    Cholesterol/lab, reviewed  Colonoscopy 10/2011, Dr. Caldwell.  He recommended 7 years. TOo busy for C-scope - will give FITKIT today  [place], Dr. Means            Mammogram, gyn  DEXA, 2018  IMPRESSION:  Normal    Thyroid US/ FNA, 6/2015   Eye exam, UTD  DDS exam, UTD    VACCINATIONS:   Zostavax, 4/2011   Tdap, 2/2014.     Pneumovax, 2006   Prevnar,  2015    Flu, yearly                                                                                                  MEDS:  In the MedCard.                                                                                                                                    REVIEW OF SYSTEMS:    No fever, chill, or night sweats  No chest pain, shortness of breath, PND,  Or orthopnea.    No cough or wheezing.    No change in bowel or bladder function.   Nocturia without dysuria, hematuria.    No unusual headaches.  No weakness    in arms or legs or slurred speech.    Left arm discomfort, after picking up heavy bucket  Still problematic, hurst when she lifts w/ pain radiating into the upper arm  Sciatic, recurrent, interferes w/ her ADL's  Has given up gardening and many of her household duties   ADL's: 100 % independent  Memory: Good  Mental Health: Good-  Having to drive spouse to all  visits  AD's: + will, living will, and POA  Nutrition: Good, eating healthy  Safety: intact  Gait: no  recent falls, sciatica pain, exacerbated w/ yard work, and housecleaning  Urinary incontinence: n/a, ++ abn urine odor, ? dysuria      Remainder of review negative except as   previously noted.                                                                                                                                        PHYSICAL EXAM:   VSS                       GENERAL:  She is alert and oriented, in no acute distress.  She is well      developed, well nourished, conversant and cooperative. Pleasant as always.    EYES: Conjunctivae and     lids are unremarkable.  Sclerae are anicteric.  Pupils are reactive.                                                     NECK:  Supple. + thyroid goiter, no bruit noted, no LN noted                                      RESPIRATORY:  Efforts unlabored.                                             LUNGS:  Clear to auscultation.                                               HEART:  Regular rate and rhythm. No bruits over the carotids noted ( known ds)   , 1+ pedal pulse, no edema today.                                 ABDOMEN:  Bowel sounds present, soft, nontender.  No hepatosplenomegaly.     MSK: Gait normal. No CCE  Left shoulder, decreased ROM  Spine: NT  Tender over the left upper buttock w/ + SLR   NEURO: VIEIRA. No tremor noted  SKIN: Warm and dry  FEET: Monofilament intact  Vibratory decreased  Callus and dystrophic nails Left>>>right                                                                               IMPRESSIONS:                                                                    Annual PE                                                   FHX: MI.     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                    CKD 3 2/2 DM               HTN assoc DM , stable                              HLD assoc DM, .improved on atorvastatin and fenofibrate                                Carotid ds                               Aortic atherosclerosis, asx                                                               Aortic stenosis        Osteopenia, asx on supplements calcium and VIt D     Thyroid goiter, asx  , s/p bx      GERD, stable on Prilosec       UTI, dysuria       Shoulder pain, left     LBP w/ sciatic sx LLE                                                                                                                                                                                                                                PLAN:   Urine culture  Pneumovax  C-scope- pt interested in [facility] options  Continue present meds  Continue f/u w/ cards and endo  Diabetic diet  Exercise  Call prn  RTC 6 mos

## 2020-06-29 ENCOUNTER — OFFICE VISIT (OUTPATIENT)
Dept: INTERNAL MEDICINE | Facility: CLINIC | Age: 80
End: 2020-06-29
Payer: MEDICARE

## 2020-06-29 VITALS
WEIGHT: 181.69 LBS | DIASTOLIC BLOOD PRESSURE: 75 MMHG | OXYGEN SATURATION: 97 % | HEIGHT: 66 IN | RESPIRATION RATE: 18 BRPM | HEART RATE: 63 BPM | SYSTOLIC BLOOD PRESSURE: 138 MMHG | BODY MASS INDEX: 29.2 KG/M2 | TEMPERATURE: 98 F

## 2020-06-29 DIAGNOSIS — Z23 NEED FOR 23-POLYVALENT PNEUMOCOCCAL POLYSACCHARIDE VACCINE: ICD-10-CM

## 2020-06-29 DIAGNOSIS — R80.9 TYPE 2 DIABETES MELLITUS WITH MICROALBUMINURIA, WITHOUT LONG-TERM CURRENT USE OF INSULIN: ICD-10-CM

## 2020-06-29 DIAGNOSIS — I15.2 HYPERTENSION ASSOCIATED WITH DIABETES: ICD-10-CM

## 2020-06-29 DIAGNOSIS — I70.0 AORTIC ATHEROSCLEROSIS: ICD-10-CM

## 2020-06-29 DIAGNOSIS — M85.80 OSTEOPENIA, UNSPECIFIED LOCATION: ICD-10-CM

## 2020-06-29 DIAGNOSIS — N18.30 CKD STAGE 3 SECONDARY TO DIABETES: ICD-10-CM

## 2020-06-29 DIAGNOSIS — K21.9 GASTROESOPHAGEAL REFLUX DISEASE WITHOUT ESOPHAGITIS: ICD-10-CM

## 2020-06-29 DIAGNOSIS — N39.0 URINARY TRACT INFECTION WITHOUT HEMATURIA, SITE UNSPECIFIED: ICD-10-CM

## 2020-06-29 DIAGNOSIS — M25.512 CHRONIC LEFT SHOULDER PAIN: ICD-10-CM

## 2020-06-29 DIAGNOSIS — E78.5 HYPERLIPIDEMIA ASSOCIATED WITH TYPE 2 DIABETES MELLITUS: ICD-10-CM

## 2020-06-29 DIAGNOSIS — Z00.00 ANNUAL PHYSICAL EXAM: Primary | ICD-10-CM

## 2020-06-29 DIAGNOSIS — E11.69 HYPERLIPIDEMIA ASSOCIATED WITH TYPE 2 DIABETES MELLITUS: ICD-10-CM

## 2020-06-29 DIAGNOSIS — E11.59 HYPERTENSION ASSOCIATED WITH DIABETES: ICD-10-CM

## 2020-06-29 DIAGNOSIS — Z12.11 COLON CANCER SCREENING: ICD-10-CM

## 2020-06-29 DIAGNOSIS — G89.29 CHRONIC LEFT SHOULDER PAIN: ICD-10-CM

## 2020-06-29 DIAGNOSIS — I65.23 BILATERAL CAROTID ARTERY STENOSIS: ICD-10-CM

## 2020-06-29 DIAGNOSIS — I35.0 NONRHEUMATIC AORTIC VALVE STENOSIS: ICD-10-CM

## 2020-06-29 DIAGNOSIS — E11.59 TYPE 2 DIABETES MELLITUS WITH OTHER CIRCULATORY COMPLICATION, WITHOUT LONG-TERM CURRENT USE OF INSULIN: ICD-10-CM

## 2020-06-29 DIAGNOSIS — E04.2 NONTOXIC MULTINODULAR GOITER: ICD-10-CM

## 2020-06-29 DIAGNOSIS — E11.22 CKD STAGE 3 SECONDARY TO DIABETES: ICD-10-CM

## 2020-06-29 DIAGNOSIS — E11.9 DM TYPE 2 WITHOUT RETINOPATHY: ICD-10-CM

## 2020-06-29 DIAGNOSIS — E11.29 TYPE 2 DIABETES MELLITUS WITH MICROALBUMINURIA, WITHOUT LONG-TERM CURRENT USE OF INSULIN: ICD-10-CM

## 2020-06-29 PROCEDURE — 99999 PR PBB SHADOW E&M-EST. PATIENT-LVL V: ICD-10-PCS | Mod: PBBFAC,HCNC,, | Performed by: INTERNAL MEDICINE

## 2020-06-29 PROCEDURE — 99499 UNLISTED E&M SERVICE: CPT | Mod: HCNC,S$GLB,, | Performed by: INTERNAL MEDICINE

## 2020-06-29 PROCEDURE — 3078F DIAST BP <80 MM HG: CPT | Mod: HCNC,CPTII,S$GLB, | Performed by: INTERNAL MEDICINE

## 2020-06-29 PROCEDURE — 90732 PPSV23 VACC 2 YRS+ SUBQ/IM: CPT | Mod: HCNC,S$GLB,, | Performed by: INTERNAL MEDICINE

## 2020-06-29 PROCEDURE — 3078F PR MOST RECENT DIASTOLIC BLOOD PRESSURE < 80 MM HG: ICD-10-PCS | Mod: HCNC,CPTII,S$GLB, | Performed by: INTERNAL MEDICINE

## 2020-06-29 PROCEDURE — 99397 PR PREVENTIVE VISIT,EST,65 & OVER: ICD-10-PCS | Mod: HCNC,25,S$GLB, | Performed by: INTERNAL MEDICINE

## 2020-06-29 PROCEDURE — 3051F PR MOST RECENT HEMOGLOBIN A1C LEVEL 7.0 - < 8.0%: ICD-10-PCS | Mod: HCNC,CPTII,S$GLB, | Performed by: INTERNAL MEDICINE

## 2020-06-29 PROCEDURE — 99397 PER PM REEVAL EST PAT 65+ YR: CPT | Mod: HCNC,25,S$GLB, | Performed by: INTERNAL MEDICINE

## 2020-06-29 PROCEDURE — 99499 RISK ADDL DX/OHS AUDIT: ICD-10-PCS | Mod: HCNC,S$GLB,, | Performed by: INTERNAL MEDICINE

## 2020-06-29 PROCEDURE — 3075F SYST BP GE 130 - 139MM HG: CPT | Mod: HCNC,CPTII,S$GLB, | Performed by: INTERNAL MEDICINE

## 2020-06-29 PROCEDURE — 3051F HG A1C>EQUAL 7.0%<8.0%: CPT | Mod: HCNC,CPTII,S$GLB, | Performed by: INTERNAL MEDICINE

## 2020-06-29 PROCEDURE — 99999 PR PBB SHADOW E&M-EST. PATIENT-LVL V: CPT | Mod: PBBFAC,HCNC,, | Performed by: INTERNAL MEDICINE

## 2020-06-29 PROCEDURE — G0009 PNEUMOCOCCAL POLYSACCHARIDE VACCINE 23-VALENT =>2YO SQ IM: ICD-10-PCS | Mod: HCNC,S$GLB,, | Performed by: INTERNAL MEDICINE

## 2020-06-29 PROCEDURE — G0009 ADMIN PNEUMOCOCCAL VACCINE: HCPCS | Mod: HCNC,S$GLB,, | Performed by: INTERNAL MEDICINE

## 2020-06-29 PROCEDURE — 3075F PR MOST RECENT SYSTOLIC BLOOD PRESS GE 130-139MM HG: ICD-10-PCS | Mod: HCNC,CPTII,S$GLB, | Performed by: INTERNAL MEDICINE

## 2020-06-29 PROCEDURE — 90732 PNEUMOCOCCAL POLYSACCHARIDE VACCINE 23-VALENT =>2YO SQ IM: ICD-10-PCS | Mod: HCNC,S$GLB,, | Performed by: INTERNAL MEDICINE

## 2020-06-29 RX ORDER — METFORMIN HYDROCHLORIDE 500 MG/1
500 TABLET ORAL 2 TIMES DAILY WITH MEALS
Qty: 180 TABLET | Refills: 3 | Status: SHIPPED | OUTPATIENT
Start: 2020-06-29 | End: 2021-03-05 | Stop reason: SDUPTHER

## 2020-07-01 ENCOUNTER — TELEPHONE (OUTPATIENT)
Dept: INTERNAL MEDICINE | Facility: CLINIC | Age: 80
End: 2020-07-01

## 2020-07-01 DIAGNOSIS — M25.512 CHRONIC LEFT SHOULDER PAIN: Primary | ICD-10-CM

## 2020-07-01 DIAGNOSIS — G89.29 CHRONIC LEFT SHOULDER PAIN: Primary | ICD-10-CM

## 2020-07-01 RX ORDER — AMPICILLIN 500 MG/1
500 CAPSULE ORAL 3 TIMES DAILY
Qty: 21 CAPSULE | Refills: 0 | Status: SHIPPED | OUTPATIENT
Start: 2020-07-01 | End: 2020-08-12

## 2020-07-01 NOTE — TELEPHONE ENCOUNTER
Please advise pt that her urine culture is + infection  escripted ampicillin to [facility]  Will need repeat culture in 3-4 weeks

## 2020-07-02 NOTE — TELEPHONE ENCOUNTER
Spoke to pt. Pt informed of results and rx.  Pt stated that she has already picked it up and started.    Pt stated that she did not get her xray done, because the wait was too long.  I tried to reschedule the pt, but the order was cancelled.  Please enter order.    Pt ok with a call back next week for scheduling. Pt requesting to go to [place].

## 2020-07-02 NOTE — TELEPHONE ENCOUNTER
Pt called. No answer. Left message to call back for results.  Pt has not checked her [facility] account.

## 2020-07-06 ENCOUNTER — TELEPHONE (OUTPATIENT)
Dept: INTERNAL MEDICINE | Facility: CLINIC | Age: 80
End: 2020-07-06

## 2020-07-06 ENCOUNTER — HOSPITAL ENCOUNTER (OUTPATIENT)
Dept: RADIOLOGY | Facility: HOSPITAL | Age: 80
Discharge: HOME | End: 2020-07-06
Attending: INTERNAL MEDICINE
Payer: MEDICARE

## 2020-07-06 DIAGNOSIS — G89.29 CHRONIC LEFT SHOULDER PAIN: ICD-10-CM

## 2020-07-06 DIAGNOSIS — M25.512 CHRONIC LEFT SHOULDER PAIN: ICD-10-CM

## 2020-07-06 PROCEDURE — 73030 XR SHOULDER COMPLETE 2 OR MORE VIEWS LEFT: ICD-10-PCS | Mod: 26,HCNC,LT, | Performed by: RADIOLOGY

## 2020-07-06 PROCEDURE — 73030 X-RAY EXAM OF SHOULDER: CPT | Mod: 26,HCNC,LT, | Performed by: RADIOLOGY

## 2020-07-06 PROCEDURE — 73030 X-RAY EXAM OF SHOULDER: CPT | Mod: TC,HCNC,FY,PO,LT

## 2020-07-06 NOTE — TELEPHONE ENCOUNTER
----- Message from Karina Wilson MD sent at 7/6/2020  1:01 PM CDT -----  Please note that your shoulder xray did not reveal the cause of your symptoms.  A Consult with the Orthopedist would be the next step, please advise if you are ready and a referral will be placed and the referral coordinator will call you to set up an appointment.  Karina Cabrales

## 2020-07-28 RX ORDER — AMLODIPINE BESYLATE 10 MG/1
TABLET ORAL
Qty: 90 TABLET | Refills: 3 | Status: SHIPPED | OUTPATIENT
Start: 2020-07-28 | End: 2021-07-21

## 2020-08-12 ENCOUNTER — OFFICE VISIT (OUTPATIENT)
Dept: CARDIOLOGY | Facility: CLINIC | Age: 80
End: 2020-08-12
Payer: MEDICARE

## 2020-08-12 VITALS
HEIGHT: 66 IN | HEART RATE: 69 BPM | SYSTOLIC BLOOD PRESSURE: 190 MMHG | DIASTOLIC BLOOD PRESSURE: 81 MMHG | WEIGHT: 180 LBS | BODY MASS INDEX: 28.93 KG/M2

## 2020-08-12 DIAGNOSIS — R80.9 TYPE 2 DIABETES MELLITUS WITH MICROALBUMINURIA, WITHOUT LONG-TERM CURRENT USE OF INSULIN: ICD-10-CM

## 2020-08-12 DIAGNOSIS — E66.9 OBESITY, DIABETES, AND HYPERTENSION SYNDROME: ICD-10-CM

## 2020-08-12 DIAGNOSIS — I70.0 AORTIC ATHEROSCLEROSIS: Primary | ICD-10-CM

## 2020-08-12 DIAGNOSIS — I35.0 NONRHEUMATIC AORTIC VALVE STENOSIS: ICD-10-CM

## 2020-08-12 DIAGNOSIS — E04.2 NONTOXIC MULTINODULAR GOITER: ICD-10-CM

## 2020-08-12 DIAGNOSIS — I51.89 LEFT VENTRICULAR DIASTOLIC DYSFUNCTION WITH PRESERVED SYSTOLIC FUNCTION: ICD-10-CM

## 2020-08-12 DIAGNOSIS — E11.29 TYPE 2 DIABETES MELLITUS WITH MICROALBUMINURIA, WITHOUT LONG-TERM CURRENT USE OF INSULIN: ICD-10-CM

## 2020-08-12 DIAGNOSIS — N18.30 CKD STAGE 3 SECONDARY TO DIABETES: ICD-10-CM

## 2020-08-12 DIAGNOSIS — E11.69 OBESITY, DIABETES, AND HYPERTENSION SYNDROME: ICD-10-CM

## 2020-08-12 DIAGNOSIS — I15.2 HYPERTENSION ASSOCIATED WITH DIABETES: ICD-10-CM

## 2020-08-12 DIAGNOSIS — E78.5 HYPERLIPIDEMIA ASSOCIATED WITH TYPE 2 DIABETES MELLITUS: ICD-10-CM

## 2020-08-12 DIAGNOSIS — E11.69 HYPERLIPIDEMIA ASSOCIATED WITH TYPE 2 DIABETES MELLITUS: ICD-10-CM

## 2020-08-12 DIAGNOSIS — Z95.2 S/P AVR (AORTIC VALVE REPLACEMENT): ICD-10-CM

## 2020-08-12 DIAGNOSIS — I15.2 OBESITY, DIABETES, AND HYPERTENSION SYNDROME: ICD-10-CM

## 2020-08-12 DIAGNOSIS — E11.59 HYPERTENSION ASSOCIATED WITH DIABETES: ICD-10-CM

## 2020-08-12 DIAGNOSIS — E11.59 OBESITY, DIABETES, AND HYPERTENSION SYNDROME: ICD-10-CM

## 2020-08-12 DIAGNOSIS — E11.22 CKD STAGE 3 SECONDARY TO DIABETES: ICD-10-CM

## 2020-08-12 DIAGNOSIS — I65.23 BILATERAL CAROTID ARTERY STENOSIS: ICD-10-CM

## 2020-08-12 PROCEDURE — 3079F DIAST BP 80-89 MM HG: CPT | Mod: HCNC,CPTII,S$GLB, | Performed by: INTERNAL MEDICINE

## 2020-08-12 PROCEDURE — 3051F PR MOST RECENT HEMOGLOBIN A1C LEVEL 7.0 - < 8.0%: ICD-10-PCS | Mod: HCNC,CPTII,S$GLB, | Performed by: INTERNAL MEDICINE

## 2020-08-12 PROCEDURE — 3079F PR MOST RECENT DIASTOLIC BLOOD PRESSURE 80-89 MM HG: ICD-10-PCS | Mod: HCNC,CPTII,S$GLB, | Performed by: INTERNAL MEDICINE

## 2020-08-12 PROCEDURE — 3077F SYST BP >= 140 MM HG: CPT | Mod: HCNC,CPTII,S$GLB, | Performed by: INTERNAL MEDICINE

## 2020-08-12 PROCEDURE — 3051F HG A1C>EQUAL 7.0%<8.0%: CPT | Mod: HCNC,CPTII,S$GLB, | Performed by: INTERNAL MEDICINE

## 2020-08-12 PROCEDURE — 99999 PR PBB SHADOW E&M-EST. PATIENT-LVL III: ICD-10-PCS | Mod: PBBFAC,HCNC,, | Performed by: INTERNAL MEDICINE

## 2020-08-12 PROCEDURE — 1159F PR MEDICATION LIST DOCUMENTED IN MEDICAL RECORD: ICD-10-PCS | Mod: HCNC,S$GLB,, | Performed by: INTERNAL MEDICINE

## 2020-08-12 PROCEDURE — 1101F PR PT FALLS ASSESS DOC 0-1 FALLS W/OUT INJ PAST YR: ICD-10-PCS | Mod: HCNC,CPTII,S$GLB, | Performed by: INTERNAL MEDICINE

## 2020-08-12 PROCEDURE — 1101F PT FALLS ASSESS-DOCD LE1/YR: CPT | Mod: HCNC,CPTII,S$GLB, | Performed by: INTERNAL MEDICINE

## 2020-08-12 PROCEDURE — 99499 RISK ADDL DX/OHS AUDIT: ICD-10-PCS | Mod: HCNC,S$GLB,, | Performed by: INTERNAL MEDICINE

## 2020-08-12 PROCEDURE — 99499 UNLISTED E&M SERVICE: CPT | Mod: HCNC,S$GLB,, | Performed by: INTERNAL MEDICINE

## 2020-08-12 PROCEDURE — 3077F PR MOST RECENT SYSTOLIC BLOOD PRESSURE >= 140 MM HG: ICD-10-PCS | Mod: HCNC,CPTII,S$GLB, | Performed by: INTERNAL MEDICINE

## 2020-08-12 PROCEDURE — 99214 PR OFFICE/OUTPT VISIT, EST, LEVL IV, 30-39 MIN: ICD-10-PCS | Mod: HCNC,S$GLB,, | Performed by: INTERNAL MEDICINE

## 2020-08-12 PROCEDURE — 1159F MED LIST DOCD IN RCRD: CPT | Mod: HCNC,S$GLB,, | Performed by: INTERNAL MEDICINE

## 2020-08-12 PROCEDURE — 99214 OFFICE O/P EST MOD 30 MIN: CPT | Mod: HCNC,S$GLB,, | Performed by: INTERNAL MEDICINE

## 2020-08-12 PROCEDURE — 99999 PR PBB SHADOW E&M-EST. PATIENT-LVL III: CPT | Mod: PBBFAC,HCNC,, | Performed by: INTERNAL MEDICINE

## 2020-08-12 RX ORDER — LOSARTAN POTASSIUM 50 MG/1
50 TABLET ORAL DAILY
Qty: 90 TABLET | Refills: 3 | Status: SHIPPED | OUTPATIENT
Start: 2020-08-12 | End: 2021-04-26 | Stop reason: DRUGHIGH

## 2020-08-12 RX ORDER — LOSARTAN POTASSIUM 50 MG/1
50 TABLET ORAL DAILY
COMMUNITY
End: 2020-08-12 | Stop reason: SDUPTHER

## 2020-08-12 NOTE — PROGRESS NOTES
Subjective:   Patient ID:  Zaida Liu is a 80 y.o. female who presents for follow-up of Hypertension      HPI: Since COVID isolation patient relaxed her diet and is not exercising. She is asymptomatic, but her blood work is worse. Dr. Cabrales increased Metformin for better Diabetes control but now patient c/o diarrhea.    BP is not at goal either.  Patient states that at home it is controlled.    Lab Results   Component Value Date     06/22/2020    K 4.1 06/22/2020     06/22/2020    CO2 23 06/22/2020    BUN 25 (H) 06/22/2020    CREATININE 1.0 06/22/2020     (H) 06/22/2020    HGBA1C 7.6 (H) 06/22/2020    MG 1.5 (L) 12/15/2015    AST 27 06/22/2020    ALT 36 06/22/2020    ALBUMIN 3.9 06/22/2020    PROT 7.0 06/22/2020    BILITOT 0.5 06/22/2020    WBC 7.32 06/22/2020    HGB 12.2 06/22/2020    HCT 36.9 (L) 06/22/2020    HCT 28 (L) 08/25/2015    MCV 89 06/22/2020     06/22/2020    INR 1.0 12/15/2015    TSH 2.646 06/22/2020    CHOL 208 (H) 06/22/2020    HDL 45 06/22/2020    LDLCALC 127.8 06/22/2020    TRIG 176 (H) 06/22/2020       Review of Systems   Constitution: Negative.   HENT: Positive for hearing loss.    Eyes: Negative.    Cardiovascular: Negative.  Negative for chest pain, claudication, dyspnea on exertion, irregular heartbeat, leg swelling, near-syncope, palpitations and syncope.   Respiratory: Negative.  Negative for cough, shortness of breath, snoring and wheezing.    Endocrine: Negative.  Negative for cold intolerance, heat intolerance, polydipsia, polyphagia and polyuria.   Skin: Negative.    Musculoskeletal: Positive for arthritis, back pain, joint pain and muscle cramps.   Gastrointestinal: Positive for abdominal pain and diarrhea.   Genitourinary: Negative.    Neurological: Positive for loss of balance.   Psychiatric/Behavioral: Negative.        Objective:   Physical Exam   Constitutional: She is oriented to person, place, and time. She appears well-developed and  "well-nourished.   BP (!) 190/81   Pulse 69   Ht 5' 6" (1.676 m)   Wt 81.6 kg (180 lb) Comment: pt reported  BMI 29.05 kg/m²      HENT:   Head: Normocephalic.   Eyes: Pupils are equal, round, and reactive to light.   Neck: Normal range of motion. Neck supple. Carotid bruit is not present. Thyromegaly present.   Goiter is present   Cardiovascular: Normal rate, regular rhythm and intact distal pulses. Exam reveals no gallop and no friction rub.   Murmur heard.   Harsh midsystolic murmur is present at the upper right sternal border.  Pulses:       Carotid pulses are 2+ on the right side and 2+ on the left side.       Radial pulses are 2+ on the right side and 2+ on the left side.        Femoral pulses are 2+ on the right side and 2+ on the left side.       Popliteal pulses are 2+ on the right side and 2+ on the left side.        Dorsalis pedis pulses are 2+ on the right side and 2+ on the left side.        Posterior tibial pulses are 2+ on the right side and 2+ on the left side.   Pulmonary/Chest: Effort normal and breath sounds normal. No respiratory distress. She has no wheezes. She has no rales. She exhibits no tenderness.   Abdominal: Soft. Bowel sounds are normal.   Musculoskeletal: Normal range of motion.         General: No edema.   Neurological: She is alert and oriented to person, place, and time.   Skin: Skin is warm and dry.   Psychiatric: She has a normal mood and affect.   Nursing note and vitals reviewed.        Assessment and Plan:     Problem List Items Addressed This Visit        Cardiology Problems    Aortic stenosis    Hypertension associated with diabetes    Left ventricular diastolic dysfunction with preserved systolic function    Aortic atherosclerosis - Primary    Bilateral carotid artery disease    Obesity, diabetes, and hypertension syndrome       Other    Hyperlipidemia associated with type 2 diabetes mellitus    Nontoxic multinodular goiter    S/P AVR (aortic valve replacement)    CKD " stage 3 secondary to diabetes    Type 2 diabetes mellitus with microalbuminuria, without long-term current use of insulin          Patient's Medications   New Prescriptions    No medications on file   Previous Medications    AMLODIPINE (NORVASC) 10 MG TABLET    TAKE 1 TABLET EVERY DAY    ASPIRIN (ECOTRIN) 81 MG EC TABLET    Take 81 mg by mouth once daily.    ATORVASTATIN (LIPITOR) 40 MG TABLET    TAKE 1 TABLET EVERY NIGHT    BIOTIN 1 MG TABLET    Take 1,000 mcg by mouth 3 (three) times daily.    FENOFIBRATE MICRONIZED (LOFIBRA) 134 MG CAP    TAKE 1 CAPSULE EVERY DAY  WITH  BREAKFAST    HYDROCHLOROTHIAZIDE (HYDRODIURIL) 50 MG TABLET    TAKE 1 TABLET EVERY DAY    LOSARTAN (COZAAR) 25 MG TABLET    TAKE 1 TABLET (25 MG TOTAL) BY MOUTH ONCE DAILY.    METFORMIN (GLUCOPHAGE) 500 MG TABLET    Take 1 tablet (500 mg total) by mouth 2 (two) times daily with meals.    METOPROLOL TARTRATE (LOPRESSOR) 25 MG TABLET    TAKE 1 TABLET TWICE DAILY    OMEPRAZOLE (PRILOSEC) 20 MG CAPSULE    Take 20 mg by mouth every morning.     VITAMIN D 1000 UNITS TAB    Take 1,000 Units by mouth once daily.   Modified Medications    No medications on file   Discontinued Medications    AMPICILLIN (PRINCIPEN) 500 MG CAPSULE    Take 1 capsule (500 mg total) by mouth 3 (three) times daily. With food and 8 oz liquid    SULFAMETHOXAZOLE-TRIMETHOPRIM 800-160MG (BACTRIM DS) 800-160 MG TAB    Take 1 tablet by mouth 2 (two) times daily. Take with food and 8 oz liquid     Increased Losartan to 50 mg daily and continue to monitor BP.  BMP in 1-2 weeks.  Life style modifications, weight loss, exercise and balanced diet discussed at length.    No follow-ups on file.

## 2020-08-13 RX ORDER — GLIPIZIDE 2.5 MG/1
2.5 TABLET, EXTENDED RELEASE ORAL
Qty: 30 TABLET | Refills: 3 | Status: SHIPPED | OUTPATIENT
Start: 2020-08-13 | End: 2020-12-10

## 2020-08-13 NOTE — TELEPHONE ENCOUNTER
----- Message from Duong Vargas sent at 8/13/2020 11:49 AM CDT -----  Regarding: Ms. CerdaHlzux-[phone]  Ms. Cerda is requesting a callback from nurse Virgen.  She states that the doctor changed her diabetes medicine and she has been having very bad diarrhea.  She would like to be advised if the doctor can change her medicine.    Callback number: Ms. CerdaPhcsh-[phone]

## 2020-08-13 NOTE — TELEPHONE ENCOUNTER
Spoke to pt. Pt stated that at her last visit with Dr. Cabrales, her metformin was increased from once daily to BID. She stated that since she increased she has been having diarrhea. She stated that she mentioned this to Dr. Gray yesterday. She was advised to call to have the medicine changed to something else.  Please advise.

## 2020-08-13 NOTE — TELEPHONE ENCOUNTER
rec she return to one daily of the metformin  Add fiber to diet, and keep well hydrated, water is her best option    And will escript a small dose of another diabetic medication    If A1C improves w/ next lab, will d/c the 2nd medication     It's important to eat on time w/ the new medication    Does she want to send to [facility]?

## 2020-08-13 NOTE — TELEPHONE ENCOUNTER
Spoke to pt. Pt stated that she increase fiber and water intake.   She stated that she would like the rx for glipizide sent to her local pharmacy for 30 day rx. Pharmacy queued.  Pt verbalized understanding to eat on time with treatment.

## 2020-08-18 DIAGNOSIS — Z12.11 COLON CANCER SCREENING: ICD-10-CM

## 2020-08-19 DIAGNOSIS — Z12.11 COLON CANCER SCREENING: Primary | ICD-10-CM

## 2020-08-27 ENCOUNTER — LAB VISIT (OUTPATIENT)
Dept: LAB | Facility: HOSPITAL | Age: 80
End: 2020-08-27
Attending: INTERNAL MEDICINE
Payer: MEDICARE

## 2020-08-27 DIAGNOSIS — E11.59 OBESITY, DIABETES, AND HYPERTENSION SYNDROME: ICD-10-CM

## 2020-08-27 DIAGNOSIS — E66.9 OBESITY, DIABETES, AND HYPERTENSION SYNDROME: ICD-10-CM

## 2020-08-27 DIAGNOSIS — Z95.2 S/P AVR (AORTIC VALVE REPLACEMENT): ICD-10-CM

## 2020-08-27 DIAGNOSIS — E11.69 OBESITY, DIABETES, AND HYPERTENSION SYNDROME: ICD-10-CM

## 2020-08-27 DIAGNOSIS — N18.30 CKD STAGE 3 SECONDARY TO DIABETES: ICD-10-CM

## 2020-08-27 DIAGNOSIS — E11.22 CKD STAGE 3 SECONDARY TO DIABETES: ICD-10-CM

## 2020-08-27 DIAGNOSIS — R80.9 TYPE 2 DIABETES MELLITUS WITH MICROALBUMINURIA, WITHOUT LONG-TERM CURRENT USE OF INSULIN: ICD-10-CM

## 2020-08-27 DIAGNOSIS — I15.2 HYPERTENSION ASSOCIATED WITH DIABETES: ICD-10-CM

## 2020-08-27 DIAGNOSIS — I65.23 BILATERAL CAROTID ARTERY STENOSIS: ICD-10-CM

## 2020-08-27 DIAGNOSIS — E78.5 HYPERLIPIDEMIA ASSOCIATED WITH TYPE 2 DIABETES MELLITUS: ICD-10-CM

## 2020-08-27 DIAGNOSIS — I15.2 OBESITY, DIABETES, AND HYPERTENSION SYNDROME: ICD-10-CM

## 2020-08-27 DIAGNOSIS — I70.0 AORTIC ATHEROSCLEROSIS: ICD-10-CM

## 2020-08-27 DIAGNOSIS — E11.29 TYPE 2 DIABETES MELLITUS WITH MICROALBUMINURIA, WITHOUT LONG-TERM CURRENT USE OF INSULIN: ICD-10-CM

## 2020-08-27 DIAGNOSIS — E11.69 HYPERLIPIDEMIA ASSOCIATED WITH TYPE 2 DIABETES MELLITUS: ICD-10-CM

## 2020-08-27 DIAGNOSIS — I35.0 NONRHEUMATIC AORTIC VALVE STENOSIS: ICD-10-CM

## 2020-08-27 DIAGNOSIS — E11.59 HYPERTENSION ASSOCIATED WITH DIABETES: ICD-10-CM

## 2020-08-27 DIAGNOSIS — I51.89 LEFT VENTRICULAR DIASTOLIC DYSFUNCTION WITH PRESERVED SYSTOLIC FUNCTION: ICD-10-CM

## 2020-08-27 DIAGNOSIS — E04.2 NONTOXIC MULTINODULAR GOITER: ICD-10-CM

## 2020-08-27 LAB
ANION GAP SERPL CALC-SCNC: 8 MMOL/L (ref 8–16)
BUN SERPL-MCNC: 25 MG/DL (ref 8–23)
CALCIUM SERPL-MCNC: 9.2 MG/DL (ref 8.7–10.5)
CHLORIDE SERPL-SCNC: 108 MMOL/L (ref 95–110)
CO2 SERPL-SCNC: 24 MMOL/L (ref 23–29)
CREAT SERPL-MCNC: 1.1 MG/DL (ref 0.5–1.4)
EST. GFR  (AFRICAN AMERICAN): 55.2 ML/MIN/1.73 M^2
EST. GFR  (NON AFRICAN AMERICAN): 47.9 ML/MIN/1.73 M^2
GLUCOSE SERPL-MCNC: 129 MG/DL (ref 70–110)
POTASSIUM SERPL-SCNC: 4.7 MMOL/L (ref 3.5–5.1)
SODIUM SERPL-SCNC: 140 MMOL/L (ref 136–145)

## 2020-08-27 PROCEDURE — 36415 COLL VENOUS BLD VENIPUNCTURE: CPT | Mod: HCNC,PO

## 2020-08-27 PROCEDURE — 80048 BASIC METABOLIC PNL TOTAL CA: CPT | Mod: HCNC

## 2020-08-28 ENCOUNTER — TELEPHONE (OUTPATIENT)
Dept: CARDIOLOGY | Facility: CLINIC | Age: 80
End: 2020-08-28

## 2020-08-28 NOTE — TELEPHONE ENCOUNTER
----- Message from Cristela Gray MD sent at 8/27/2020  5:29 PM CDT -----  Please inform the patient that  Her comp. Profile was fine.

## 2020-09-18 ENCOUNTER — LAB VISIT (OUTPATIENT)
Dept: FAMILY MEDICINE | Facility: CLINIC | Age: 80
End: 2020-09-18
Payer: MEDICARE

## 2020-09-18 DIAGNOSIS — Z12.11 COLON CANCER SCREENING: ICD-10-CM

## 2020-09-18 PROCEDURE — U0003 INFECTIOUS AGENT DETECTION BY NUCLEIC ACID (DNA OR RNA); SEVERE ACUTE RESPIRATORY SYNDROME CORONAVIRUS 2 (SARS-COV-2) (CORONAVIRUS DISEASE [COVID-19]), AMPLIFIED PROBE TECHNIQUE, MAKING USE OF HIGH THROUGHPUT TECHNOLOGIES AS DESCRIBED BY CMS-2020-01-R: HCPCS | Mod: HCNC

## 2020-09-20 LAB — SARS-COV-2 RNA RESP QL NAA+PROBE: NOT DETECTED

## 2020-09-21 ENCOUNTER — ANESTHESIA (OUTPATIENT)
Dept: ENDOSCOPY | Facility: HOSPITAL | Age: 80
End: 2020-09-21
Payer: MEDICARE

## 2020-09-21 ENCOUNTER — HOSPITAL ENCOUNTER (OUTPATIENT)
Facility: HOSPITAL | Age: 80
Discharge: HOME | End: 2020-09-21
Attending: INTERNAL MEDICINE | Admitting: INTERNAL MEDICINE
Payer: MEDICARE

## 2020-09-21 ENCOUNTER — ANESTHESIA EVENT (OUTPATIENT)
Dept: ENDOSCOPY | Facility: HOSPITAL | Age: 80
End: 2020-09-21
Payer: MEDICARE

## 2020-09-21 VITALS
TEMPERATURE: 98 F | SYSTOLIC BLOOD PRESSURE: 154 MMHG | DIASTOLIC BLOOD PRESSURE: 94 MMHG | HEART RATE: 91 BPM | OXYGEN SATURATION: 100 % | RESPIRATION RATE: 18 BRPM

## 2020-09-21 DIAGNOSIS — Z12.11 COLON CANCER SCREENING: ICD-10-CM

## 2020-09-21 PROCEDURE — D9220A PRA ANESTHESIA: Mod: PT,HCNC,ANES, | Performed by: ANESTHESIOLOGY

## 2020-09-21 PROCEDURE — 63600175 PHARM REV CODE 636 W HCPCS: Mod: HCNC | Performed by: STUDENT IN AN ORGANIZED HEALTH CARE EDUCATION/TRAINING PROGRAM

## 2020-09-21 PROCEDURE — 27201089 HC SNARE, DISP (ANY): Mod: HCNC | Performed by: INTERNAL MEDICINE

## 2020-09-21 PROCEDURE — 88305 TISSUE EXAM BY PATHOLOGIST: CPT | Mod: 26,HCNC,, | Performed by: PATHOLOGY

## 2020-09-21 PROCEDURE — 37000009 HC ANESTHESIA EA ADD 15 MINS: Mod: HCNC | Performed by: INTERNAL MEDICINE

## 2020-09-21 PROCEDURE — 25000003 PHARM REV CODE 250: Mod: HCNC | Performed by: STUDENT IN AN ORGANIZED HEALTH CARE EDUCATION/TRAINING PROGRAM

## 2020-09-21 PROCEDURE — D9220A PRA ANESTHESIA: ICD-10-PCS | Mod: PT,HCNC,CRNA, | Performed by: STUDENT IN AN ORGANIZED HEALTH CARE EDUCATION/TRAINING PROGRAM

## 2020-09-21 PROCEDURE — 88305 TISSUE EXAM BY PATHOLOGIST: ICD-10-PCS | Mod: 26,HCNC,, | Performed by: PATHOLOGY

## 2020-09-21 PROCEDURE — D9220A PRA ANESTHESIA: Mod: PT,HCNC,CRNA, | Performed by: STUDENT IN AN ORGANIZED HEALTH CARE EDUCATION/TRAINING PROGRAM

## 2020-09-21 PROCEDURE — 37000008 HC ANESTHESIA 1ST 15 MINUTES: Mod: HCNC | Performed by: INTERNAL MEDICINE

## 2020-09-21 PROCEDURE — D9220A PRA ANESTHESIA: ICD-10-PCS | Mod: PT,HCNC,ANES, | Performed by: ANESTHESIOLOGY

## 2020-09-21 PROCEDURE — 88305 TISSUE EXAM BY PATHOLOGIST: CPT | Mod: 59,HCNC | Performed by: PATHOLOGY

## 2020-09-21 PROCEDURE — 45385 COLONOSCOPY W/LESION REMOVAL: CPT | Mod: HCNC | Performed by: INTERNAL MEDICINE

## 2020-09-21 RX ORDER — PROPOFOL 10 MG/ML
INJECTION, EMULSION INTRAVENOUS
Status: DISCONTINUED
Start: 2020-09-21 | End: 2020-09-21 | Stop reason: HOSPADM

## 2020-09-21 RX ORDER — PROPOFOL 10 MG/ML
VIAL (ML) INTRAVENOUS
Status: DISCONTINUED | OUTPATIENT
Start: 2020-09-21 | End: 2020-09-21

## 2020-09-21 RX ORDER — SODIUM CHLORIDE 9 MG/ML
INJECTION, SOLUTION INTRAVENOUS CONTINUOUS PRN
Status: DISCONTINUED | OUTPATIENT
Start: 2020-09-21 | End: 2020-09-21

## 2020-09-21 RX ORDER — LIDOCAINE HYDROCHLORIDE 20 MG/ML
INJECTION, SOLUTION EPIDURAL; INFILTRATION; INTRACAUDAL; PERINEURAL
Status: DISCONTINUED
Start: 2020-09-21 | End: 2020-09-21 | Stop reason: HOSPADM

## 2020-09-21 RX ORDER — LIDOCAINE HYDROCHLORIDE 10 MG/ML
1 INJECTION, SOLUTION EPIDURAL; INFILTRATION; INTRACAUDAL; PERINEURAL ONCE
Status: CANCELLED | OUTPATIENT
Start: 2020-09-21 | End: 2020-09-21

## 2020-09-21 RX ORDER — SODIUM CHLORIDE 9 MG/ML
INJECTION, SOLUTION INTRAVENOUS CONTINUOUS
Status: CANCELLED | OUTPATIENT
Start: 2020-09-21

## 2020-09-21 RX ORDER — LIDOCAINE HYDROCHLORIDE 20 MG/ML
INJECTION INTRAVENOUS
Status: DISCONTINUED | OUTPATIENT
Start: 2020-09-21 | End: 2020-09-21

## 2020-09-21 RX ADMIN — PROPOFOL 30 MG: 10 INJECTION, EMULSION INTRAVENOUS at 02:09

## 2020-09-21 RX ADMIN — SODIUM CHLORIDE: 9 INJECTION, SOLUTION INTRAVENOUS at 02:09

## 2020-09-21 RX ADMIN — PROPOFOL 50 MG: 10 INJECTION, EMULSION INTRAVENOUS at 02:09

## 2020-09-21 RX ADMIN — PROPOFOL 20 MG: 10 INJECTION, EMULSION INTRAVENOUS at 02:09

## 2020-09-21 RX ADMIN — PROPOFOL 40 MG: 10 INJECTION, EMULSION INTRAVENOUS at 02:09

## 2020-09-21 RX ADMIN — Medication 100 MG: at 02:09

## 2020-09-21 NOTE — DISCHARGE INSTRUCTIONS

## 2020-09-21 NOTE — H&P
Pre-Procedure H&P:  Reason for Procedure: Colon Ca screening    HPI:  Pt is a 80 y.o. female here for colonoscopy.  Pt. Last had C-scope in 2011, no polyps.  No known family Hx of Colon Ca.  No current GI complaints.      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 Jameel pericardial valve tissue via upper dominguez-sternotomy.    BREAST SURGERY      breast reduction    CHOLECYSTECTOMY      TONSILLECTOMY      TOTAL REDUCTION MAMMOPLASTY      TUBAL LIGATION         Family History   Problem Relation Age of Onset    Breast cancer Cousin     Cancer Cousin         breast    Breast cancer Other     Cancer Other         breast    Colon cancer Other     Heart disease Mother         CHF    Heart failure Mother         d. 85    Hypertension Mother     Heart disease Father         d. 61    Alcohol abuse Father     Stroke Father     Hypertension Father     Lymphoma Sister         on immunosuppresant    Rheum arthritis Sister         d.80    Cancer Sister         lymphoma    Heart disease Sister     Stroke Sister         mild    Other Sister         frequent urination    Heart disease Brother         d. 82    Colon cancer Sister         d. 81    Heart disease Brother         d. 60    Diabetes Brother     No Known Problems Daughter         lives in [facility]  engaged, 2nd m pending    No Known Problems Son         lives in [place], 15 yo son, 20 yo dtr- 2019    Hypertension Son         lives in [facility], 15 yo old- 2019    Heart attack Neg Hx     Hyperlipidemia Neg Hx        Social History     Socioeconomic History    Marital status:      Spouse name: Not on file    Number of children: Not on file    Years of education: Not on file    Highest education level: Not on file   Occupational History    Not on file   Social Needs    Financial resource strain: Not on file    Food insecurity     Worry: Not on file     Inability: Not on file    Transportation needs     Medical: Not on file     Non-medical: Not on file   Tobacco Use    Smoking status: Former Smoker     Quit date: 10/14/1966     Years since quittin.9    Smokeless tobacco: Never Used   Substance and Sexual Activity    Alcohol use: Yes     Alcohol/week: 2.0 standard drinks     Types: 2 Glasses of wine per week     Comment: 1-2 glasses on wine on weekend    Drug use: No    Sexual activity: Not Currently     Partners: Male   Lifestyle    Physical activity     Days per week: Not on file     Minutes per session: Not on file    Stress: Not on file   Relationships    Social connections     Talks on phone: Not on file     Gets together: Not on file     Attends Hindu service: Not on file     Active member of club or organization: Not on file     Attends meetings of clubs or organizations: Not on file     Relationship status: Not on file   Other Topics Concern    Not on file   Social History Narrative          Spouse retired, first spouse  when he was 38.          3 children,  2 sons, and daughter,      son with hypertension.  No heart disease as of yet.      Her spouse has hypertension, AFib and memory      issues, but apparently since he has retired they are doing better.           FHX:    Bro 82, d. massive MI, + DM,s/p amputation LE limb    Sis , new dx cerebrovascular ds,  s/p stent x2 in cerebral carotid                 Endoscopic History:  C-scope, 2011    Review of patient's allergies indicates:   Allergen Reactions    Codeine Other (See Comments)       No current facility-administered medications on file prior to encounter.      Current Outpatient Medications on File Prior to Encounter   Medication Sig Dispense Refill    aspirin (ECOTRIN) 81 MG EC tablet Take 81 mg by mouth once daily.      atorvastatin (LIPITOR) 40 MG tablet TAKE 1 TABLET EVERY NIGHT 90 tablet 3    biotin 1 mg tablet Take 1,000 mcg by mouth 3 (three) times daily.      fenofibrate micronized (LOFIBRA) 134 MG Cap TAKE 1 CAPSULE EVERY DAY  WITH  BREAKFAST 90 capsule 3    hydroCHLOROthiazide (HYDRODIURIL) 50 MG tablet TAKE 1 TABLET EVERY DAY 90 tablet 3    metFORMIN (GLUCOPHAGE) 500 MG tablet Take 1 tablet (500 mg total) by mouth 2 (two) times daily with meals. 180 tablet 3    metoprolol tartrate (LOPRESSOR) 25 MG tablet TAKE 1 TABLET TWICE DAILY 180 tablet 3    omeprazole (PRILOSEC) 20 MG capsule Take 20 mg by mouth every morning.       vitamin D 1000 units Tab Take 1,000 Units by mouth once daily.       No current facility-administered medications for this encounter.     Facility-Administered Medications Ordered in Other Encounters:     0.9%  NaCl infusion, , Intravenous, Continuous PRN, Diane GANDARA Do, CRNA    ROS: Negative x 10    Patient Vitals for the past 24 hrs:   BP Temp Temp src Pulse Resp SpO2   09/21/20 1321 (!) 177/71 98.4 °F (36.9 °C) Oral (!) 57 20 97 %       Gen: Well developed, well nourished, no apparent distress  HEENT: Anicteric, PERRLA  CV: S1, S2, no murmers/rubs, non-displaced PMI  Lungs: CTA-B, normal excursion  Abd: Soft, NT, ND, normal BS's, no HSM  Ext: No c/c/e, 1+ DP pulses to BLE's  Neuro: CN II-XII grossly intact, no asterixis.  Skin: No rashes/lesions.  Psych: AA&O x 4    Assessment:  Pt. Is a 80 y.o. female with:  1. Colon Ca screening    Recommendations:  1. Colonoscopy  2.  F/U with PCP      I would like to take this opportunity to thank you for this consult.  If you have any questions or concerns, please do not hesitate to contact me.

## 2020-09-21 NOTE — PLAN OF CARE
Pt given discharge instructions. Allowed time for questions and concerns. Pt vocalized understand. AVS given and IV taken out. Pt left unit via WC to ride.

## 2020-09-21 NOTE — ANESTHESIA PREPROCEDURE EVALUATION
2020  Zaida Liu is a 80 y.o., female.  To undergo Procedure(s) (LRB):  COLONOSCOPY (N/A)     Denies CP/SOB/MI/CVA/URI symptoms.  Endorses GERD controlled with medication.  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 Jameel pericardial valve tissue via upper dominguez-sternotomy.    BREAST SURGERY      breast reduction    CHOLECYSTECTOMY      TONSILLECTOMY      TOTAL REDUCTION MAMMOPLASTY      TUBAL LIGATION         Social History:  Social History     Socioeconomic History    Marital status:      Spouse name: Not on file    Number of children: Not on file    Years of education: Not on file    Highest education level: Not on file   Occupational History    Not on file   Social Needs    Financial resource strain: Not on file    Food insecurity     Worry: Not on file     Inability: Not on file    Transportation needs     Medical: Not on file     Non-medical: Not on file   Tobacco Use    Smoking status: Former Smoker     Quit date: 10/14/1966     Years since quittin.9    Smokeless tobacco: Never Used   Substance and Sexual Activity    Alcohol use: Yes     Alcohol/week: 2.0  standard drinks     Types: 2 Glasses of wine per week     Comment: 1-2 glasses on wine on weekend    Drug use: No    Sexual activity: Not Currently     Partners: Male   Lifestyle    Physical activity     Days per week: Not on file     Minutes per session: Not on file    Stress: Not on file   Relationships    Social connections     Talks on phone: Not on file     Gets together: Not on file     Attends Pentecostal service: Not on file     Active member of club or organization: Not on file     Attends meetings of clubs or organizations: Not on file     Relationship status: Not on file   Other Topics Concern    Not on file   Social History Narrative          Spouse retired, first spouse  when he was 38.          3 children,  2 sons, and daughter,      son with hypertension.  No heart disease as of yet.      Her spouse has hypertension, AFib and memory      issues, but apparently since he has retired they are doing better.           FHX:    Bro 82, d. massive MI, + DM,s/p amputation LE limb    Sis , new dx cerebrovascular ds, s/p stent x2 in cerebral carotid                 Medications:  No current facility-administered medications on file prior to encounter.      Current Outpatient Medications on File Prior to Encounter   Medication Sig Dispense Refill    aspirin (ECOTRIN) 81 MG EC tablet Take 81 mg by mouth once daily.      atorvastatin (LIPITOR) 40 MG tablet TAKE 1 TABLET EVERY NIGHT 90 tablet 3    biotin 1 mg tablet Take 1,000 mcg by mouth 3 (three) times daily.      fenofibrate micronized (LOFIBRA) 134 MG Cap TAKE 1 CAPSULE EVERY DAY  WITH  BREAKFAST 90 capsule 3    hydroCHLOROthiazide (HYDRODIURIL) 50 MG tablet TAKE 1 TABLET EVERY DAY 90 tablet 3    metFORMIN (GLUCOPHAGE) 500 MG tablet Take 1 tablet (500 mg total) by mouth 2 (two) times daily with meals. 180 tablet 3    metoprolol tartrate (LOPRESSOR) 25 MG tablet TAKE 1 TABLET TWICE DAILY 180 tablet 3    omeprazole (PRILOSEC) 20 MG capsule Take 20  mg by mouth every morning.       vitamin D 1000 units Tab Take 1,000 Units by mouth once daily.         Allergies:  Review of patient's allergies indicates:   Allergen Reactions    Codeine Other (See Comments)       Active Problems:  Patient Active Problem List   Diagnosis    Hyperlipidemia associated with type 2 diabetes mellitus    Nontoxic multinodular goiter    Aortic stenosis    S/P AVR (aortic valve replacement)    Hypertension associated with diabetes    Left ventricular diastolic dysfunction with preserved systolic function    Aortic atherosclerosis    Osteopenia    Gastroesophageal reflux disease without esophagitis    Bilateral carotid artery disease    CKD stage 3 secondary to diabetes    Type 2 diabetes mellitus with microalbuminuria, without long-term current use of insulin    Obesity, diabetes, and hypertension syndrome    Overweight (BMI 25.0-29.9)    DM type 2 without retinopathy    Type 2 diabetes mellitus with circulatory disorder    Sensorineural hearing loss (SNHL), bilateral       Diagnostic Studies:  Results for ROBERTO ASKEW (MRN [id number]) as of 9/21/2020 08:33   Ref. Range 6/22/2020 09:25   WBC Latest Ref Range: 3.90 - 12.70 K/uL 7.32   RBC Latest Ref Range: 4.00 - 5.40 M/uL 4.15   Hemoglobin Latest Ref Range: 12.0 - 16.0 g/dL 12.2   Hematocrit Latest Ref Range: 37.0 - 48.5 % 36.9 (L)   MCV Latest Ref Range: 82 - 98 fL 89   MCH Latest Ref Range: 27.0 - 31.0 pg 29.4   MCHC Latest Ref Range: 32.0 - 36.0 g/dL 33.1   RDW Latest Ref Range: 11.5 - 14.5 % 12.8   Platelets Latest Ref Range: 150 - 350 K/uL 307   MPV Latest Ref Range: 9.2 - 12.9 fL 11.5   Gran% Latest Ref Range: 38.0 - 73.0 % 60.5   Gran # (ANC) Latest Ref Range: 1.8 - 7.7 K/uL 4.4   Lymph% Latest Ref Range: 18.0 - 48.0 % 27.7   Lymph # Latest Ref Range: 1.0 - 4.8 K/uL 2.0   Mono% Latest Ref Range: 4.0 - 15.0 % 7.7   Mono # Latest Ref Range: 0.3 - 1.0 K/uL 0.6   Eosinophil% Latest Ref Range: 0.0 - 8.0 % 2.9   Eos #  Latest Ref Range: 0.0 - 0.5 K/uL 0.2   Basophil% Latest Ref Range: 0.0 - 1.9 % 0.8   Baso # Latest Ref Range: 0.00 - 0.20 K/uL 0.06   nRBC Latest Ref Range: 0 /100 WBC 0   Differential Method Unknown Automated   Immature Grans (Abs) Latest Ref Range: 0.00 - 0.04 K/uL 0.03   Immature Granulocytes Latest Ref Range: 0.0 - 0.5 % 0.4   Results for ROBERTO ASKEW (MRN [id number]) as of 9/21/2020 08:33   Ref. Range 8/27/2020 09:45   Sodium Latest Ref Range: 136 - 145 mmol/L 140   Potassium Latest Ref Range: 3.5 - 5.1 mmol/L 4.7   Chloride Latest Ref Range: 95 - 110 mmol/L 108   CO2 Latest Ref Range: 23 - 29 mmol/L 24   Anion Gap Latest Ref Range: 8 - 16 mmol/L 8   BUN, Bld Latest Ref Range: 8 - 23 mg/dL 25 (H)   Creatinine Latest Ref Range: 0.5 - 1.4 mg/dL 1.1   eGFR if non African American Latest Ref Range: >60 mL/min/1.73 m^2 47.9 (A)   eGFR if African American Latest Ref Range: >60 mL/min/1.73 m^2 55.2 (A)   Glucose Latest Ref Range: 70 - 110 mg/dL 129 (H)   Calcium Latest Ref Range: 8.7 - 10.5 mg/dL 9.2     EKG (10/2/17):  Normal sinus rhythm   Left ventricular hypertrophy   ST and/or T wave abnormalities secondary to hypertrophy     TTE (4/4/16):    1 - Concentric remodeling.     2 - Normal left ventricular systolic function (EF 60-65%).     3 - Moderate left atrial enlargement.     4 - Left ventricular diastolic dysfunction.     5 - Normal right ventricular systolic function .     6 - The estimated PA systolic pressure is greater than 22 mmHg.     7 - Aortic valve prosthesis, effective prosthetic valve area corrected for BSA is 1.24 cm2.     8 - Trivial tricuspid regurgitation.     24 Hour Vitals:      See Nursing Charting For Additional Vitals    Anesthesia Evaluation    I have reviewed the Patient Summary Reports.    I have reviewed the Nursing Notes.       Review of Systems  Anesthesia Hx:  No problems with previous Anesthesia   Denies Personal Hx of Anesthesia complications.   Social:  Former Smoker, Social Alcohol  Use    Cardiovascular:   Exercise tolerance: good Hypertension, well controlled hyperlipidemia ECG has been reviewed. AS s/p AVR    H/O DVT    B/L carotid stenosis   Pulmonary:  Pulmonary Normal    Renal/:   Chronic Renal Disease, CRI    Hepatic/GI:   GERD, well controlled    Musculoskeletal:  Spine Disorders:    Endocrine:   Diabetes, type 2        Physical Exam  General:  Well nourished    Airway/Jaw/Neck:   MP3, TMD > 3FB, teeth intact     Chest/Lungs:  Chest/Lungs Clear    Heart/Vascular:  Heart Findings: Normal            Anesthesia Plan  Type of Anesthesia, risks & benefits discussed:  Anesthesia Type:  general, MAC  Patient's Preference:   Intra-op Monitoring Plan: standard ASA monitors  Intra-op Monitoring Plan Comments:   Post Op Pain Control Plan: multimodal analgesia  Post Op Pain Control Plan Comments:   Induction:   IV  Beta Blocker:  Patient is on a Beta-Blocker and has received one dose within the past 24 hours (No further documentation required).       Informed Consent: Patient understands risks and agrees with Anesthesia plan.  Questions answered. Anesthesia consent signed with patient.  ASA Score: 3     Day of Surgery Review of History & Physical:  There are no significant changes.          Ready For Surgery From Anesthesia Perspective.

## 2020-09-21 NOTE — TRANSFER OF CARE
Anesthesia Transfer of Care Note    Patient: Zaida Liu    Procedure(s) Performed: Procedure(s) (LRB):  COLONOSCOPY (N/A)    Patient location: GI    Anesthesia Type: general    Transport from OR: Transported from OR on room air with adequate spontaneous ventilation    Post pain: adequate analgesia    Post assessment: no apparent anesthetic complications and tolerated procedure well    Post vital signs: stable    Level of consciousness: awake    Nausea/Vomiting: no nausea/vomiting    Complications: none    Transfer of care protocol was followed      Last vitals:   Visit Vitals  BP (!) 143/61 (BP Location: Left arm, Patient Position: Lying)   Pulse 61   Temp 36.7 °C (98.1 °F) (Oral)   Resp 18   SpO2 99%   Breastfeeding No

## 2020-09-21 NOTE — PLAN OF CARE
MD at bedside with patient.  Results and follow-up discussed, patient verbalized understanding.  No acute distress voiced or observed.

## 2020-09-21 NOTE — [employer] PATIENT INSTRUCTIONS
Discharge Summary/Instructions after an Endoscopic Procedure  Patient Name: Zaida Liu  Patient MRN: [id number]  Patient YOB: 1940 Monday, September 21, 2020  Maik Arango MD  RESTRICTIONS:  During your procedure today, you received medications for sedation.  These   medications may affect your judgment, balance and coordination.  Therefore,   for 24 hours, you have the following restrictions:   - DO NOT drive a car, operate machinery, make legal/financial decisions,   sign important papers or drink alcohol.    ACTIVITY:  Today: no heavy lifting, straining or running due to procedural   sedation/anesthesia.  The following day: return to full activity including work.  DIET:  Eat and drink normally unless instructed otherwise.     TREATMENT FOR COMMON SIDE EFFECTS:  - Mild abdominal pain, nausea, belching, bloating or excessive gas:  rest,   eat lightly and use a heating pad.  - Sore Throat: treat with throat lozenges and/or gargle with warm salt   water.  - Because air was used during the procedure, expelling large amounts of air   from your rectum or belching is normal.  - If a bowel prep was taken, you may not have a bowel movement for 1-3 days.    This is normal.  SYMPTOMS TO WATCH FOR AND REPORT TO YOUR PHYSICIAN:  1. Abdominal pain or bloating, other than gas cramps.  2. Chest pain.  3. Back pain.  4. Signs of infection such as: chills or fever occurring within 24 hours   after the procedure.  5. Rectal bleeding, which would show as bright red, maroon, or black stools.   (A tablespoon of blood from the rectum is not serious, especially if   hemorrhoids are present.)  6. Vomiting.  7. Weakness or dizziness.  GO DIRECTLY TO THE NEAREST EMERGENCY ROOM IF YOU HAVE ANY OF THE FOLLOWING:      Difficulty breathing              Chills and/or fever over 101 F   Persistent vomiting and/or vomiting blood   Severe abdominal pain   Severe chest pain   Black, tarry stools   Bleeding- more than one  tablespoon   Any other symptom or condition that you feel may need urgent attention  Your doctor recommends these additional instructions:  If any biopsies were taken, your doctors clinic will contact you in 1 to 2   weeks with any results.  - Discharge patient to home.   - Resume previous diet.   - Continue present medications.   - Await pathology results.   - Repeat colonoscopy in 3 years for surveillance based on pathology results.     - Return to primary care physician as previously scheduled.   - Return to GI clinic PRN.   - The findings and recommendations were discussed with the patient's family.     - Patient has a contact number available for emergencies.  The signs and   symptoms of potential delayed complications were discussed with the   patient.  Return to normal activities tomorrow.  Written discharge   instructions were provided to the patient.  For questions, problems or results please call your physician - Maik Arango MD at Work:  [phone].  [facility] can be reached at [phone]     IF A COMPLICATION OR EMERGENCY SITUATION ARISES AND YOU ARE UNABLE TO REACH   YOUR PHYSICIAN - GO DIRECTLY TO THE EMERGENCY ROOM.  Maik Arango MD  9/21/2020 3:07:25 PM  This report has been verified and signed electronically.  [employer]

## 2020-09-22 NOTE — ANESTHESIA POSTPROCEDURE EVALUATION
Anesthesia Post Evaluation    Patient: Zaida Liu    Procedure(s) Performed: Procedure(s) (LRB):  COLONOSCOPY (N/A)    Final Anesthesia Type: general    Patient location during evaluation: GI PACU  Patient participation: Yes- Able to Participate  Level of consciousness: awake and alert and oriented  Post-procedure vital signs: reviewed and stable  Pain management: adequate  Airway patency: patent    PONV status at discharge: No PONV  Anesthetic complications: no      Cardiovascular status: hemodynamically stable and blood pressure returned to baseline  Respiratory status: spontaneous ventilation, room air and unassisted  Hydration status: euvolemic  Follow-up not needed.          Vitals Value Taken Time   /94 09/21/20 1535   Temp 36.7 °C (98.1 °F) 09/21/20 1505   Pulse 91 09/21/20 1535   Resp 18 09/21/20 1535   SpO2 100 % 09/21/20 1535         Event Time   Out of Recovery 15:45:33         Pain/Nilo Score: Nilo Score: 10 (9/21/2020  3:35 PM)

## 2020-09-23 LAB
FINAL PATHOLOGIC DIAGNOSIS: NORMAL
GROSS: NORMAL

## 2020-09-29 ENCOUNTER — PATIENT MESSAGE (OUTPATIENT)
Dept: OTHER | Facility: OTHER | Age: 80
End: 2020-09-29

## 2020-10-04 ENCOUNTER — NURSE TRIAGE (OUTPATIENT)
Dept: ADMINISTRATIVE | Facility: CLINIC | Age: 80
End: 2020-10-04

## 2020-10-04 NOTE — TELEPHONE ENCOUNTER
Pt contacted through Post Procedural Symptom Tracking. Denies any cough, fever, or difficulty breathing since procedure.  Pt instructed to call [facility] or contact provider with any changes of condition or concerns.     Reason for Disposition   [1] Follow-up call to recent contact AND [2] information only call, no triage required    Protocols used: INFORMATION ONLY CALL - NO TRIAGE-A-

## 2020-10-12 ENCOUNTER — TELEPHONE (OUTPATIENT)
Dept: INTERNAL MEDICINE | Facility: CLINIC | Age: 80
End: 2020-10-12

## 2020-10-12 NOTE — TELEPHONE ENCOUNTER
Spoke to pt. Pt scheduled for a preop for cataract surgery on 10/21/20 at 1:00 PM with [facility].

## 2020-10-12 NOTE — TELEPHONE ENCOUNTER
----- Message from Rocio Raymundo sent at 10/12/2020  9:25 AM CDT -----  Contact: Patient [phone]  Patient stated that they missed a call from [facility].    Please call and advise.    Thank You

## 2020-10-21 ENCOUNTER — OFFICE VISIT (OUTPATIENT)
Dept: INTERNAL MEDICINE | Facility: CLINIC | Age: 80
End: 2020-10-21
Payer: MEDICARE

## 2020-10-21 VITALS
HEIGHT: 66 IN | OXYGEN SATURATION: 98 % | BODY MASS INDEX: 28.95 KG/M2 | SYSTOLIC BLOOD PRESSURE: 168 MMHG | WEIGHT: 180.13 LBS | HEART RATE: 66 BPM | DIASTOLIC BLOOD PRESSURE: 64 MMHG | TEMPERATURE: 97 F | RESPIRATION RATE: 16 BRPM

## 2020-10-21 DIAGNOSIS — Z23 FLU VACCINE NEED: ICD-10-CM

## 2020-10-21 DIAGNOSIS — Z01.818 PRE-OP EVALUATION: Primary | ICD-10-CM

## 2020-10-21 PROCEDURE — G0008 ADMIN INFLUENZA VIRUS VAC: HCPCS | Mod: HCNC,S$GLB,, | Performed by: INTERNAL MEDICINE

## 2020-10-21 PROCEDURE — 1101F PR PT FALLS ASSESS DOC 0-1 FALLS W/OUT INJ PAST YR: ICD-10-PCS | Mod: HCNC,CPTII,GC,S$GLB | Performed by: STUDENT IN AN ORGANIZED HEALTH CARE EDUCATION/TRAINING PROGRAM

## 2020-10-21 PROCEDURE — 90694 VACC AIIV4 NO PRSRV 0.5ML IM: CPT | Mod: HCNC,S$GLB,, | Performed by: INTERNAL MEDICINE

## 2020-10-21 PROCEDURE — 99213 OFFICE O/P EST LOW 20 MIN: CPT | Mod: HCNC,25,GC,S$GLB | Performed by: STUDENT IN AN ORGANIZED HEALTH CARE EDUCATION/TRAINING PROGRAM

## 2020-10-21 PROCEDURE — 1159F PR MEDICATION LIST DOCUMENTED IN MEDICAL RECORD: ICD-10-PCS | Mod: HCNC,GC,S$GLB, | Performed by: STUDENT IN AN ORGANIZED HEALTH CARE EDUCATION/TRAINING PROGRAM

## 2020-10-21 PROCEDURE — 90694 FLU VACCINE - QUADRIVALENT - ADJUVANTED: ICD-10-PCS | Mod: HCNC,S$GLB,, | Performed by: INTERNAL MEDICINE

## 2020-10-21 PROCEDURE — 99999 PR PBB SHADOW E&M-EST. PATIENT-LVL IV: CPT | Mod: PBBFAC,HCNC,GC, | Performed by: STUDENT IN AN ORGANIZED HEALTH CARE EDUCATION/TRAINING PROGRAM

## 2020-10-21 PROCEDURE — 3078F DIAST BP <80 MM HG: CPT | Mod: HCNC,CPTII,GC,S$GLB | Performed by: STUDENT IN AN ORGANIZED HEALTH CARE EDUCATION/TRAINING PROGRAM

## 2020-10-21 PROCEDURE — 1126F AMNT PAIN NOTED NONE PRSNT: CPT | Mod: HCNC,GC,S$GLB, | Performed by: STUDENT IN AN ORGANIZED HEALTH CARE EDUCATION/TRAINING PROGRAM

## 2020-10-21 PROCEDURE — 1101F PT FALLS ASSESS-DOCD LE1/YR: CPT | Mod: HCNC,CPTII,GC,S$GLB | Performed by: STUDENT IN AN ORGANIZED HEALTH CARE EDUCATION/TRAINING PROGRAM

## 2020-10-21 PROCEDURE — 1159F MED LIST DOCD IN RCRD: CPT | Mod: HCNC,GC,S$GLB, | Performed by: STUDENT IN AN ORGANIZED HEALTH CARE EDUCATION/TRAINING PROGRAM

## 2020-10-21 PROCEDURE — 3077F SYST BP >= 140 MM HG: CPT | Mod: HCNC,CPTII,GC,S$GLB | Performed by: STUDENT IN AN ORGANIZED HEALTH CARE EDUCATION/TRAINING PROGRAM

## 2020-10-21 PROCEDURE — 1126F PR PAIN SEVERITY QUANTIFIED, NO PAIN PRESENT: ICD-10-PCS | Mod: HCNC,GC,S$GLB, | Performed by: STUDENT IN AN ORGANIZED HEALTH CARE EDUCATION/TRAINING PROGRAM

## 2020-10-21 PROCEDURE — G0008 FLU VACCINE - QUADRIVALENT - ADJUVANTED: ICD-10-PCS | Mod: HCNC,S$GLB,, | Performed by: INTERNAL MEDICINE

## 2020-10-21 PROCEDURE — 3077F PR MOST RECENT SYSTOLIC BLOOD PRESSURE >= 140 MM HG: ICD-10-PCS | Mod: HCNC,CPTII,GC,S$GLB | Performed by: STUDENT IN AN ORGANIZED HEALTH CARE EDUCATION/TRAINING PROGRAM

## 2020-10-21 PROCEDURE — 3078F PR MOST RECENT DIASTOLIC BLOOD PRESSURE < 80 MM HG: ICD-10-PCS | Mod: HCNC,CPTII,GC,S$GLB | Performed by: STUDENT IN AN ORGANIZED HEALTH CARE EDUCATION/TRAINING PROGRAM

## 2020-10-21 PROCEDURE — 99213 PR OFFICE/OUTPT VISIT, EST, LEVL III, 20-29 MIN: ICD-10-PCS | Mod: HCNC,25,GC,S$GLB | Performed by: STUDENT IN AN ORGANIZED HEALTH CARE EDUCATION/TRAINING PROGRAM

## 2020-10-21 PROCEDURE — 99999 PR PBB SHADOW E&M-EST. PATIENT-LVL IV: ICD-10-PCS | Mod: PBBFAC,HCNC,GC, | Performed by: STUDENT IN AN ORGANIZED HEALTH CARE EDUCATION/TRAINING PROGRAM

## 2020-10-21 RX ORDER — METOPROLOL TARTRATE 25 MG/1
25 TABLET, FILM COATED ORAL 2 TIMES DAILY
Qty: 180 TABLET | Refills: 3 | Status: SHIPPED | OUTPATIENT
Start: 2020-10-21 | End: 2022-01-14 | Stop reason: SDUPTHER

## 2020-10-21 RX ORDER — MOXIFLOXACIN 5 MG/ML
SOLUTION/ DROPS OPHTHALMIC
COMMUNITY
Start: 2020-10-20 | End: 2021-04-26

## 2020-10-21 RX ORDER — ATORVASTATIN CALCIUM 40 MG/1
40 TABLET, FILM COATED ORAL NIGHTLY
Qty: 90 TABLET | Refills: 3 | Status: SHIPPED | OUTPATIENT
Start: 2020-10-21 | End: 2021-11-17 | Stop reason: SDUPTHER

## 2020-10-21 RX ORDER — LOTEPREDNOL ETABONATE 3.8 MG/G
GEL OPHTHALMIC
COMMUNITY
Start: 2020-10-20 | End: 2021-04-26

## 2020-10-21 NOTE — PROGRESS NOTES
[facility] Note  10/21/2020      Subjective:       Patient ID:  The patient is a 80 y.o. female being seen for pre-op evaluation.     Chief Complaint: Pre-op Exam and Flu Vaccine    HPI  The pt is a 79 yo F with HTN, HLD, T2 DM (last A1c 7.6 in 06/2020) and CKD III that presents to   clinic for pre-op evaluation for cataract surgery. She is scheduled to have a cataract surgery on   the right eye first on 10/26 with Dr. Cisneros at [facility]. The plan is to complete   the left eye following the right. No new limitations in physical activity. She can work in the yard,   she can climb stairs and complete household chores without SOB.     She otherwise feels well today and has no other questions or concerns. She denies any new   chest pain, SOB, palpitations, cough, n/v, fevers, chills or LOC. She would also like the flu shot   today. Requests that she have some of her scripts refilled as well- specifically atorvastatin and  metoprolol.     Review of Systems   Constitutional: Negative for chills and fever.   HENT: Negative for congestion and sore throat.    Respiratory: Positive for cough (intermittent, non productive). Negative for shortness of breath.    Cardiovascular: Negative for chest pain and palpitations.   Gastrointestinal: Negative for nausea and vomiting.   Genitourinary: Negative for dysuria and frequency.   Musculoskeletal: Negative for back pain and myalgias.   Skin: Negative for itching and rash.   Neurological: Positive for dizziness (2/2 tinnitus- constant, not new). Negative for loss of consciousness and headaches.       Medication List with Changes/Refills   Current Medications    AMLODIPINE (NORVASC) 10 MG TABLET    TAKE 1 TABLET EVERY DAY    ASPIRIN (ECOTRIN) 81 MG EC TABLET    Take 81 mg by mouth once daily.    ATORVASTATIN (LIPITOR) 40 MG TABLET    TAKE 1 TABLET EVERY NIGHT    BIOTIN 1 MG TABLET    Take 1,000 mcg by mouth 3 (three) times daily.    FENOFIBRATE MICRONIZED  "(LOFIBRA) 134 MG CAP    TAKE 1 CAPSULE EVERY DAY  WITH  BREAKFAST    GLIPIZIDE (GLUCOTROL) 2.5 MG TR24    Take 1 tablet (2.5 mg total) by mouth daily with breakfast.    HYDROCHLOROTHIAZIDE (HYDRODIURIL) 50 MG TABLET    TAKE 1 TABLET EVERY DAY    LOSARTAN (COZAAR) 50 MG TABLET    Take 1 tablet (50 mg total) by mouth once daily.    LOTEMAX SM 0.38 % DRPG        METFORMIN (GLUCOPHAGE) 500 MG TABLET    Take 1 tablet (500 mg total) by mouth 2 (two) times daily with meals.    METOPROLOL TARTRATE (LOPRESSOR) 25 MG TABLET    TAKE 1 TABLET TWICE DAILY    MOXIFLOXACIN (VIGAMOX) 0.5 % OPHTHALMIC SOLUTION        OMEPRAZOLE (PRILOSEC) 20 MG CAPSULE    Take 20 mg by mouth every morning.     VITAMIN D 1000 UNITS TAB    Take 1,000 Units by mouth once daily.       Patient Active Problem List   Diagnosis    Hyperlipidemia associated with type 2 diabetes mellitus    Nontoxic multinodular goiter    Aortic stenosis    S/P AVR (aortic valve replacement)    Hypertension associated with diabetes    Left ventricular diastolic dysfunction with preserved systolic function    Aortic atherosclerosis    Osteopenia    Gastroesophageal reflux disease without esophagitis    Bilateral carotid artery disease    CKD stage 3 secondary to diabetes    Type 2 diabetes mellitus with microalbuminuria, without long-term current use of insulin    Obesity, diabetes, and hypertension syndrome    Overweight (BMI 25.0-29.9)    DM type 2 without retinopathy    Type 2 diabetes mellitus with circulatory disorder    Sensorineural hearing loss (SNHL), bilateral           Objective:      BP (!) 168/64 (BP Location: Right arm, Patient Position: Sitting, BP Method: Medium (Manual))   Pulse 66   Temp 97.3 °F (36.3 °C) (Temporal)   Resp 16   Ht 5' 6" (1.676 m)   Wt 81.7 kg (180 lb 1.9 oz)   SpO2 98%   BMI 29.07 kg/m²   Estimated body mass index is 29.07 kg/m² as calculated from the following:    Height as of this encounter: 5' 6" (1.676 m).    " Weight as of this encounter: 81.7 kg (180 lb 1.9 oz).  Physical Exam   Constitutional: She is oriented to person, place, and time and well-developed, well-nourished, and in no distress. No distress.   HENT:   Head: Normocephalic and atraumatic.   Eyes: Pupils are equal, round, and reactive to light. Conjunctivae are normal. No scleral icterus.   Neck: No tracheal deviation present.   Cardiovascular: Normal rate, regular rhythm and intact distal pulses.   Pulses:       Radial pulses are 2+ on the right side and 2+ on the left side.   Pulmonary/Chest: Effort normal and breath sounds normal. No stridor. No respiratory distress. She has no wheezes. She has no rales.   Abdominal: Soft. Bowel sounds are normal. She exhibits no distension. There is no abdominal tenderness. There is no guarding.   Musculoskeletal: Normal range of motion.         General: No tenderness or edema.   Neurological: She is alert and oriented to person, place, and time. GCS score is 15.   Skin: Skin is warm and dry. No rash noted. She is not diaphoretic. No erythema.   Psychiatric: Affect and judgment normal.   Nursing note and vitals reviewed.        Assessment and Plan:     #Pre-op evaluation  - Ok from medical stand point to proceed with cataract surgery  - Forms filled out- faxed and patient given personal copy as well  - Scripts for Lipitor and Metoprolol sent to pharmacy    #Flu Vaccine Need  - Flu vaccine given in office today          Problem List Items Addressed This Visit     None      Visit Diagnoses     Pre-op evaluation    -  Primary    Flu vaccine need              Follow Up:     Follow up as needed.     Mague Gordon DO, MS  Internal Medicine PGY-2

## 2020-11-18 ENCOUNTER — TELEPHONE (OUTPATIENT)
Dept: INTERNAL MEDICINE | Facility: CLINIC | Age: 80
End: 2020-11-18

## 2020-12-07 ENCOUNTER — LAB VISIT (OUTPATIENT)
Dept: LAB | Facility: HOSPITAL | Age: 80
End: 2020-12-07
Attending: INTERNAL MEDICINE
Payer: MEDICARE

## 2020-12-07 DIAGNOSIS — E11.69 HYPERLIPIDEMIA ASSOCIATED WITH TYPE 2 DIABETES MELLITUS: ICD-10-CM

## 2020-12-07 DIAGNOSIS — E78.5 HYPERLIPIDEMIA ASSOCIATED WITH TYPE 2 DIABETES MELLITUS: ICD-10-CM

## 2020-12-07 DIAGNOSIS — E11.9 DM TYPE 2 WITHOUT RETINOPATHY: ICD-10-CM

## 2020-12-07 LAB
ALBUMIN SERPL BCP-MCNC: 4.3 G/DL (ref 3.5–5.2)
ALP SERPL-CCNC: 46 U/L (ref 55–135)
ALT SERPL W/O P-5'-P-CCNC: 37 U/L (ref 10–44)
ANION GAP SERPL CALC-SCNC: 10 MMOL/L (ref 8–16)
AST SERPL-CCNC: 25 U/L (ref 10–40)
BILIRUB SERPL-MCNC: 0.5 MG/DL (ref 0.1–1)
BUN SERPL-MCNC: 30 MG/DL (ref 8–23)
CALCIUM SERPL-MCNC: 9.5 MG/DL (ref 8.7–10.5)
CHLORIDE SERPL-SCNC: 105 MMOL/L (ref 95–110)
CHOLEST SERPL-MCNC: 227 MG/DL (ref 120–199)
CHOLEST/HDLC SERPL: 4.4 {RATIO} (ref 2–5)
CO2 SERPL-SCNC: 27 MMOL/L (ref 23–29)
CREAT SERPL-MCNC: 1.2 MG/DL (ref 0.5–1.4)
EST. GFR  (AFRICAN AMERICAN): 49.7 ML/MIN/1.73 M^2
EST. GFR  (NON AFRICAN AMERICAN): 43.1 ML/MIN/1.73 M^2
GLUCOSE SERPL-MCNC: 135 MG/DL (ref 70–110)
HDLC SERPL-MCNC: 52 MG/DL (ref 40–75)
HDLC SERPL: 22.9 % (ref 20–50)
LDLC SERPL CALC-MCNC: 144 MG/DL (ref 63–159)
NONHDLC SERPL-MCNC: 175 MG/DL
POTASSIUM SERPL-SCNC: 4.4 MMOL/L (ref 3.5–5.1)
PROT SERPL-MCNC: 7.5 G/DL (ref 6–8.4)
SODIUM SERPL-SCNC: 142 MMOL/L (ref 136–145)
TRIGL SERPL-MCNC: 155 MG/DL (ref 30–150)

## 2020-12-07 PROCEDURE — 80061 LIPID PANEL: CPT | Mod: HCNC

## 2020-12-07 PROCEDURE — 83036 HEMOGLOBIN GLYCOSYLATED A1C: CPT | Mod: HCNC

## 2020-12-07 PROCEDURE — 80053 COMPREHEN METABOLIC PANEL: CPT | Mod: HCNC

## 2020-12-07 PROCEDURE — 36415 COLL VENOUS BLD VENIPUNCTURE: CPT | Mod: HCNC,PO

## 2020-12-08 ENCOUNTER — TELEPHONE (OUTPATIENT)
Dept: INTERNAL MEDICINE | Facility: CLINIC | Age: 80
End: 2020-12-08

## 2020-12-08 LAB
ESTIMATED AVG GLUCOSE: 140 MG/DL (ref 68–131)
HBA1C MFR BLD HPLC: 6.5 % (ref 4–5.6)

## 2020-12-08 NOTE — TELEPHONE ENCOUNTER
----- Message from Karina Wilson MD sent at 12/7/2020  5:39 PM CST -----  Please review your lab work and we will discuss at your pending office visit.  Karina Cabrales

## 2020-12-10 RX ORDER — GLIPIZIDE 2.5 MG/1
TABLET, EXTENDED RELEASE ORAL
Qty: 30 TABLET | Refills: 3 | Status: SHIPPED | OUTPATIENT
Start: 2020-12-10 | End: 2021-01-19 | Stop reason: SDUPTHER

## 2020-12-11 ENCOUNTER — PATIENT MESSAGE (OUTPATIENT)
Dept: OTHER | Facility: OTHER | Age: 80
End: 2020-12-11

## 2021-01-06 ENCOUNTER — TELEPHONE (OUTPATIENT)
Dept: INTERNAL MEDICINE | Facility: CLINIC | Age: 81
End: 2021-01-06

## 2021-01-10 ENCOUNTER — IMMUNIZATION (OUTPATIENT)
Dept: OBSTETRICS AND GYNECOLOGY | Facility: CLINIC | Age: 81
End: 2021-01-10
Payer: MEDICARE

## 2021-01-10 DIAGNOSIS — Z23 NEED FOR VACCINATION: ICD-10-CM

## 2021-01-10 PROCEDURE — 91300 COVID-19, MRNA, LNP-S, PF, 30 MCG/0.3 ML DOSE VACCINE: CPT | Mod: PBBFAC | Performed by: FAMILY MEDICINE

## 2021-01-19 RX ORDER — GLIPIZIDE 2.5 MG/1
2.5 TABLET, EXTENDED RELEASE ORAL DAILY
Qty: 30 TABLET | Refills: 3 | Status: SHIPPED | OUTPATIENT
Start: 2021-01-19 | End: 2021-04-16

## 2021-02-01 ENCOUNTER — IMMUNIZATION (OUTPATIENT)
Dept: OBSTETRICS AND GYNECOLOGY | Facility: CLINIC | Age: 81
End: 2021-02-01
Payer: MEDICARE

## 2021-02-01 DIAGNOSIS — Z23 NEED FOR VACCINATION: Primary | ICD-10-CM

## 2021-02-01 PROCEDURE — 91300 COVID-19, MRNA, LNP-S, PF, 30 MCG/0.3 ML DOSE VACCINE: CPT | Mod: PBBFAC | Performed by: NURSE PRACTITIONER

## 2021-02-01 PROCEDURE — 0002A COVID-19, MRNA, LNP-S, PF, 30 MCG/0.3 ML DOSE VACCINE: CPT | Mod: PBBFAC | Performed by: NURSE PRACTITIONER

## 2021-02-09 ENCOUNTER — TELEPHONE (OUTPATIENT)
Dept: CARDIOLOGY | Facility: CLINIC | Age: 81
End: 2021-02-09

## 2021-02-09 DIAGNOSIS — I15.2 HYPERTENSION ASSOCIATED WITH DIABETES: ICD-10-CM

## 2021-02-09 DIAGNOSIS — E78.5 HYPERLIPIDEMIA ASSOCIATED WITH TYPE 2 DIABETES MELLITUS: ICD-10-CM

## 2021-02-09 DIAGNOSIS — E11.29 TYPE 2 DIABETES MELLITUS WITH MICROALBUMINURIA, WITHOUT LONG-TERM CURRENT USE OF INSULIN: ICD-10-CM

## 2021-02-09 DIAGNOSIS — E11.59 HYPERTENSION ASSOCIATED WITH DIABETES: ICD-10-CM

## 2021-02-09 DIAGNOSIS — R80.9 TYPE 2 DIABETES MELLITUS WITH MICROALBUMINURIA, WITHOUT LONG-TERM CURRENT USE OF INSULIN: ICD-10-CM

## 2021-02-09 DIAGNOSIS — I65.23 BILATERAL CAROTID ARTERY STENOSIS: Primary | ICD-10-CM

## 2021-02-09 DIAGNOSIS — E11.69 HYPERLIPIDEMIA ASSOCIATED WITH TYPE 2 DIABETES MELLITUS: ICD-10-CM

## 2021-02-23 LAB
LEFT EYE DM RETINOPATHY: NEGATIVE
RIGHT EYE DM RETINOPATHY: NEGATIVE

## 2021-03-05 RX ORDER — METFORMIN HYDROCHLORIDE 500 MG/1
500 TABLET ORAL 2 TIMES DAILY WITH MEALS
Qty: 180 TABLET | Refills: 3 | Status: SHIPPED | OUTPATIENT
Start: 2021-03-05 | End: 2021-07-21

## 2021-03-10 ENCOUNTER — PES CALL (OUTPATIENT)
Dept: ADMINISTRATIVE | Facility: CLINIC | Age: 81
End: 2021-03-10

## 2021-04-16 RX ORDER — GLIPIZIDE 2.5 MG/1
2.5 TABLET, EXTENDED RELEASE ORAL DAILY
Qty: 90 TABLET | Refills: 3 | Status: SHIPPED | OUTPATIENT
Start: 2021-04-16 | End: 2022-03-07

## 2021-04-16 RX ORDER — HYDROCHLOROTHIAZIDE 50 MG/1
50 TABLET ORAL DAILY
Qty: 90 TABLET | Refills: 3 | Status: SHIPPED | OUTPATIENT
Start: 2021-04-16 | End: 2022-03-07 | Stop reason: SDUPTHER

## 2021-04-19 ENCOUNTER — TELEPHONE (OUTPATIENT)
Dept: CARDIOLOGY | Facility: CLINIC | Age: 81
End: 2021-04-19

## 2021-04-19 ENCOUNTER — LAB VISIT (OUTPATIENT)
Dept: LAB | Facility: HOSPITAL | Age: 81
End: 2021-04-19
Attending: INTERNAL MEDICINE
Payer: MEDICARE

## 2021-04-19 DIAGNOSIS — I65.23 BILATERAL CAROTID ARTERY STENOSIS: ICD-10-CM

## 2021-04-19 DIAGNOSIS — E78.5 HYPERLIPIDEMIA ASSOCIATED WITH TYPE 2 DIABETES MELLITUS: ICD-10-CM

## 2021-04-19 DIAGNOSIS — E11.69 HYPERLIPIDEMIA ASSOCIATED WITH TYPE 2 DIABETES MELLITUS: ICD-10-CM

## 2021-04-19 DIAGNOSIS — I15.2 HYPERTENSION ASSOCIATED WITH DIABETES: ICD-10-CM

## 2021-04-19 DIAGNOSIS — R80.9 TYPE 2 DIABETES MELLITUS WITH MICROALBUMINURIA, WITHOUT LONG-TERM CURRENT USE OF INSULIN: ICD-10-CM

## 2021-04-19 DIAGNOSIS — E11.59 HYPERTENSION ASSOCIATED WITH DIABETES: ICD-10-CM

## 2021-04-19 DIAGNOSIS — E11.29 TYPE 2 DIABETES MELLITUS WITH MICROALBUMINURIA, WITHOUT LONG-TERM CURRENT USE OF INSULIN: ICD-10-CM

## 2021-04-19 LAB
ALBUMIN SERPL BCP-MCNC: 3.8 G/DL (ref 3.5–5.2)
ALP SERPL-CCNC: 44 U/L (ref 55–135)
ALT SERPL W/O P-5'-P-CCNC: 29 U/L (ref 10–44)
ANION GAP SERPL CALC-SCNC: 11 MMOL/L (ref 8–16)
AST SERPL-CCNC: 23 U/L (ref 10–40)
BILIRUB SERPL-MCNC: 0.4 MG/DL (ref 0.1–1)
BUN SERPL-MCNC: 23 MG/DL (ref 8–23)
CALCIUM SERPL-MCNC: 8.7 MG/DL (ref 8.7–10.5)
CHLORIDE SERPL-SCNC: 107 MMOL/L (ref 95–110)
CHOLEST SERPL-MCNC: 206 MG/DL (ref 120–199)
CHOLEST/HDLC SERPL: 4.5 {RATIO} (ref 2–5)
CO2 SERPL-SCNC: 23 MMOL/L (ref 23–29)
CREAT SERPL-MCNC: 1 MG/DL (ref 0.5–1.4)
EST. GFR  (AFRICAN AMERICAN): >60 ML/MIN/1.73 M^2
EST. GFR  (NON AFRICAN AMERICAN): 53.3 ML/MIN/1.73 M^2
ESTIMATED AVG GLUCOSE: 148 MG/DL (ref 68–131)
GLUCOSE SERPL-MCNC: 134 MG/DL (ref 70–110)
HBA1C MFR BLD: 6.8 % (ref 4–5.6)
HDLC SERPL-MCNC: 46 MG/DL (ref 40–75)
HDLC SERPL: 22.3 % (ref 20–50)
LDLC SERPL CALC-MCNC: 133.6 MG/DL (ref 63–159)
NONHDLC SERPL-MCNC: 160 MG/DL
POTASSIUM SERPL-SCNC: 4 MMOL/L (ref 3.5–5.1)
PROT SERPL-MCNC: 6.8 G/DL (ref 6–8.4)
SODIUM SERPL-SCNC: 141 MMOL/L (ref 136–145)
T4 FREE SERPL-MCNC: 1.01 NG/DL (ref 0.71–1.51)
TRIGL SERPL-MCNC: 132 MG/DL (ref 30–150)
TSH SERPL DL<=0.005 MIU/L-ACNC: 4.24 UIU/ML (ref 0.4–4)

## 2021-04-19 PROCEDURE — 83036 HEMOGLOBIN GLYCOSYLATED A1C: CPT | Performed by: INTERNAL MEDICINE

## 2021-04-19 PROCEDURE — 36415 COLL VENOUS BLD VENIPUNCTURE: CPT | Mod: PO | Performed by: INTERNAL MEDICINE

## 2021-04-19 PROCEDURE — 80053 COMPREHEN METABOLIC PANEL: CPT | Performed by: INTERNAL MEDICINE

## 2021-04-19 PROCEDURE — 84443 ASSAY THYROID STIM HORMONE: CPT | Performed by: INTERNAL MEDICINE

## 2021-04-19 PROCEDURE — 80061 LIPID PANEL: CPT | Performed by: INTERNAL MEDICINE

## 2021-04-19 PROCEDURE — 84439 ASSAY OF FREE THYROXINE: CPT | Performed by: INTERNAL MEDICINE

## 2021-04-24 ENCOUNTER — PATIENT OUTREACH (OUTPATIENT)
Dept: ADMINISTRATIVE | Facility: OTHER | Age: 81
End: 2021-04-24

## 2021-04-24 DIAGNOSIS — E11.9 TYPE 2 DIABETES MELLITUS WITHOUT COMPLICATION, UNSPECIFIED WHETHER LONG TERM INSULIN USE: Primary | ICD-10-CM

## 2021-04-26 ENCOUNTER — TELEPHONE (OUTPATIENT)
Dept: OTOLARYNGOLOGY | Facility: CLINIC | Age: 81
End: 2021-04-26

## 2021-04-26 ENCOUNTER — PES CALL (OUTPATIENT)
Dept: ADMINISTRATIVE | Facility: CLINIC | Age: 81
End: 2021-04-26

## 2021-04-26 ENCOUNTER — OFFICE VISIT (OUTPATIENT)
Dept: CARDIOLOGY | Facility: CLINIC | Age: 81
End: 2021-04-26
Payer: MEDICARE

## 2021-04-26 VITALS
DIASTOLIC BLOOD PRESSURE: 68 MMHG | HEIGHT: 66 IN | SYSTOLIC BLOOD PRESSURE: 190 MMHG | WEIGHT: 190.94 LBS | BODY MASS INDEX: 30.69 KG/M2 | HEART RATE: 72 BPM

## 2021-04-26 DIAGNOSIS — E11.59 HYPERTENSION ASSOCIATED WITH DIABETES: Primary | ICD-10-CM

## 2021-04-26 DIAGNOSIS — E11.69 OBESITY, DIABETES, AND HYPERTENSION SYNDROME: ICD-10-CM

## 2021-04-26 DIAGNOSIS — E11.29 TYPE 2 DIABETES MELLITUS WITH MICROALBUMINURIA, WITHOUT LONG-TERM CURRENT USE OF INSULIN: ICD-10-CM

## 2021-04-26 DIAGNOSIS — E78.5 HYPERLIPIDEMIA ASSOCIATED WITH TYPE 2 DIABETES MELLITUS: ICD-10-CM

## 2021-04-26 DIAGNOSIS — E11.22 CKD STAGE 3 SECONDARY TO DIABETES: ICD-10-CM

## 2021-04-26 DIAGNOSIS — I65.23 BILATERAL CAROTID ARTERY STENOSIS: ICD-10-CM

## 2021-04-26 DIAGNOSIS — E11.69 HYPERLIPIDEMIA ASSOCIATED WITH TYPE 2 DIABETES MELLITUS: ICD-10-CM

## 2021-04-26 DIAGNOSIS — Z95.2 S/P AVR (AORTIC VALVE REPLACEMENT): ICD-10-CM

## 2021-04-26 DIAGNOSIS — I51.89 LEFT VENTRICULAR DIASTOLIC DYSFUNCTION WITH PRESERVED SYSTOLIC FUNCTION: ICD-10-CM

## 2021-04-26 DIAGNOSIS — I15.2 HYPERTENSION ASSOCIATED WITH DIABETES: Primary | ICD-10-CM

## 2021-04-26 DIAGNOSIS — I15.2 OBESITY, DIABETES, AND HYPERTENSION SYNDROME: ICD-10-CM

## 2021-04-26 DIAGNOSIS — I70.0 AORTIC ATHEROSCLEROSIS: ICD-10-CM

## 2021-04-26 DIAGNOSIS — R80.9 TYPE 2 DIABETES MELLITUS WITH MICROALBUMINURIA, WITHOUT LONG-TERM CURRENT USE OF INSULIN: ICD-10-CM

## 2021-04-26 DIAGNOSIS — E04.2 NONTOXIC MULTINODULAR GOITER: ICD-10-CM

## 2021-04-26 DIAGNOSIS — N18.30 CKD STAGE 3 SECONDARY TO DIABETES: ICD-10-CM

## 2021-04-26 DIAGNOSIS — E66.9 OBESITY, DIABETES, AND HYPERTENSION SYNDROME: ICD-10-CM

## 2021-04-26 DIAGNOSIS — E11.59 OBESITY, DIABETES, AND HYPERTENSION SYNDROME: ICD-10-CM

## 2021-04-26 DIAGNOSIS — I35.0 NONRHEUMATIC AORTIC VALVE STENOSIS: ICD-10-CM

## 2021-04-26 PROCEDURE — 3288F PR FALLS RISK ASSESSMENT DOCUMENTED: ICD-10-PCS | Mod: CPTII,S$GLB,, | Performed by: INTERNAL MEDICINE

## 2021-04-26 PROCEDURE — 1159F PR MEDICATION LIST DOCUMENTED IN MEDICAL RECORD: ICD-10-PCS | Mod: S$GLB,,, | Performed by: INTERNAL MEDICINE

## 2021-04-26 PROCEDURE — 3288F FALL RISK ASSESSMENT DOCD: CPT | Mod: CPTII,S$GLB,, | Performed by: INTERNAL MEDICINE

## 2021-04-26 PROCEDURE — 99499 UNLISTED E&M SERVICE: CPT | Mod: S$GLB,,, | Performed by: INTERNAL MEDICINE

## 2021-04-26 PROCEDURE — 99999 PR PBB SHADOW E&M-EST. PATIENT-LVL III: CPT | Mod: PBBFAC,,, | Performed by: INTERNAL MEDICINE

## 2021-04-26 PROCEDURE — 1101F PT FALLS ASSESS-DOCD LE1/YR: CPT | Mod: CPTII,S$GLB,, | Performed by: INTERNAL MEDICINE

## 2021-04-26 PROCEDURE — 1101F PR PT FALLS ASSESS DOC 0-1 FALLS W/OUT INJ PAST YR: ICD-10-PCS | Mod: CPTII,S$GLB,, | Performed by: INTERNAL MEDICINE

## 2021-04-26 PROCEDURE — 99999 PR PBB SHADOW E&M-EST. PATIENT-LVL III: ICD-10-PCS | Mod: PBBFAC,,, | Performed by: INTERNAL MEDICINE

## 2021-04-26 PROCEDURE — 3078F DIAST BP <80 MM HG: CPT | Mod: CPTII,S$GLB,, | Performed by: INTERNAL MEDICINE

## 2021-04-26 PROCEDURE — 99214 PR OFFICE/OUTPT VISIT, EST, LEVL IV, 30-39 MIN: ICD-10-PCS | Mod: S$GLB,,, | Performed by: INTERNAL MEDICINE

## 2021-04-26 PROCEDURE — 99214 OFFICE O/P EST MOD 30 MIN: CPT | Mod: S$GLB,,, | Performed by: INTERNAL MEDICINE

## 2021-04-26 PROCEDURE — 1126F AMNT PAIN NOTED NONE PRSNT: CPT | Mod: S$GLB,,, | Performed by: INTERNAL MEDICINE

## 2021-04-26 PROCEDURE — 3077F SYST BP >= 140 MM HG: CPT | Mod: CPTII,S$GLB,, | Performed by: INTERNAL MEDICINE

## 2021-04-26 PROCEDURE — 1126F PR PAIN SEVERITY QUANTIFIED, NO PAIN PRESENT: ICD-10-PCS | Mod: S$GLB,,, | Performed by: INTERNAL MEDICINE

## 2021-04-26 PROCEDURE — 3077F PR MOST RECENT SYSTOLIC BLOOD PRESSURE >= 140 MM HG: ICD-10-PCS | Mod: CPTII,S$GLB,, | Performed by: INTERNAL MEDICINE

## 2021-04-26 PROCEDURE — 3078F PR MOST RECENT DIASTOLIC BLOOD PRESSURE < 80 MM HG: ICD-10-PCS | Mod: CPTII,S$GLB,, | Performed by: INTERNAL MEDICINE

## 2021-04-26 PROCEDURE — 99499 RISK ADDL DX/OHS AUDIT: ICD-10-PCS | Mod: S$GLB,,, | Performed by: INTERNAL MEDICINE

## 2021-04-26 PROCEDURE — 1159F MED LIST DOCD IN RCRD: CPT | Mod: S$GLB,,, | Performed by: INTERNAL MEDICINE

## 2021-04-26 RX ORDER — LOSARTAN POTASSIUM 100 MG/1
100 TABLET ORAL DAILY
Qty: 90 TABLET | Refills: 3 | Status: SHIPPED | OUTPATIENT
Start: 2021-04-26 | End: 2022-03-07 | Stop reason: SDUPTHER

## 2021-04-26 RX ORDER — LOSARTAN POTASSIUM 100 MG/1
100 TABLET ORAL DAILY
COMMUNITY
End: 2021-04-26 | Stop reason: SDUPTHER

## 2021-05-03 ENCOUNTER — PATIENT OUTREACH (OUTPATIENT)
Dept: ADMINISTRATIVE | Facility: HOSPITAL | Age: 81
End: 2021-05-03

## 2021-05-14 ENCOUNTER — HOSPITAL ENCOUNTER (OUTPATIENT)
Dept: CARDIOLOGY | Facility: HOSPITAL | Age: 81
Discharge: HOME | End: 2021-05-14
Attending: INTERNAL MEDICINE
Payer: MEDICARE

## 2021-05-14 DIAGNOSIS — E04.2 NONTOXIC MULTINODULAR GOITER: ICD-10-CM

## 2021-05-14 DIAGNOSIS — E11.69 OBESITY, DIABETES, AND HYPERTENSION SYNDROME: ICD-10-CM

## 2021-05-14 DIAGNOSIS — E11.69 HYPERLIPIDEMIA ASSOCIATED WITH TYPE 2 DIABETES MELLITUS: ICD-10-CM

## 2021-05-14 DIAGNOSIS — I35.0 NONRHEUMATIC AORTIC VALVE STENOSIS: ICD-10-CM

## 2021-05-14 DIAGNOSIS — N18.30 CKD STAGE 3 SECONDARY TO DIABETES: ICD-10-CM

## 2021-05-14 DIAGNOSIS — R80.9 TYPE 2 DIABETES MELLITUS WITH MICROALBUMINURIA, WITHOUT LONG-TERM CURRENT USE OF INSULIN: ICD-10-CM

## 2021-05-14 DIAGNOSIS — I15.2 OBESITY, DIABETES, AND HYPERTENSION SYNDROME: ICD-10-CM

## 2021-05-14 DIAGNOSIS — E11.22 CKD STAGE 3 SECONDARY TO DIABETES: ICD-10-CM

## 2021-05-14 DIAGNOSIS — I51.89 LEFT VENTRICULAR DIASTOLIC DYSFUNCTION WITH PRESERVED SYSTOLIC FUNCTION: ICD-10-CM

## 2021-05-14 DIAGNOSIS — E11.29 TYPE 2 DIABETES MELLITUS WITH MICROALBUMINURIA, WITHOUT LONG-TERM CURRENT USE OF INSULIN: ICD-10-CM

## 2021-05-14 DIAGNOSIS — Z95.2 S/P AVR (AORTIC VALVE REPLACEMENT): ICD-10-CM

## 2021-05-14 DIAGNOSIS — I15.2 HYPERTENSION ASSOCIATED WITH DIABETES: ICD-10-CM

## 2021-05-14 DIAGNOSIS — I70.0 AORTIC ATHEROSCLEROSIS: ICD-10-CM

## 2021-05-14 DIAGNOSIS — I65.23 BILATERAL CAROTID ARTERY STENOSIS: ICD-10-CM

## 2021-05-14 DIAGNOSIS — E11.59 OBESITY, DIABETES, AND HYPERTENSION SYNDROME: ICD-10-CM

## 2021-05-14 DIAGNOSIS — E66.9 OBESITY, DIABETES, AND HYPERTENSION SYNDROME: ICD-10-CM

## 2021-05-14 DIAGNOSIS — E78.5 HYPERLIPIDEMIA ASSOCIATED WITH TYPE 2 DIABETES MELLITUS: ICD-10-CM

## 2021-05-14 DIAGNOSIS — E11.59 HYPERTENSION ASSOCIATED WITH DIABETES: ICD-10-CM

## 2021-05-14 LAB
LEFT CBA DIAS: 16 CM/S
LEFT CBA SYS: 109 CM/S
LEFT CCA DIST DIAS: 16 CM/S
LEFT CCA DIST SYS: 113 CM/S
LEFT CCA MID DIAS: 16 CM/S
LEFT CCA MID SYS: 119 CM/S
LEFT CCA PROX DIAS: 12 CM/S
LEFT CCA PROX SYS: 116 CM/S
LEFT ECA DIAS: 8 CM/S
LEFT ECA SYS: 24 CM/S
LEFT ICA DIST DIAS: 21 CM/S
LEFT ICA DIST SYS: 89 CM/S
LEFT ICA MID DIAS: 17 CM/S
LEFT ICA MID SYS: 87 CM/S
LEFT ICA PROX DIAS: 14 CM/S
LEFT ICA PROX SYS: 70 CM/S
LEFT VERTEBRAL DIAS: 0 CM/S
LEFT VERTEBRAL SYS: 71 CM/S
OHS CV CAROTID RIGHT ICA EDV HIGHEST: 19
OHS CV CAROTID ULTRASOUND LEFT ICA/CCA RATIO: 0.79
OHS CV CAROTID ULTRASOUND RIGHT ICA/CCA RATIO: 1.15
OHS CV PV CAROTID LEFT HIGHEST CCA: 119
OHS CV PV CAROTID LEFT HIGHEST ICA: 89
OHS CV PV CAROTID RIGHT HIGHEST CCA: 111
OHS CV PV CAROTID RIGHT HIGHEST ICA: 117
OHS CV US CAROTID LEFT HIGHEST EDV: 21
RIGHT CBA DIAS: 13 CM/S
RIGHT CBA SYS: 110 CM/S
RIGHT CCA DIST DIAS: 14 CM/S
RIGHT CCA DIST SYS: 102 CM/S
RIGHT CCA MID DIAS: 14 CM/S
RIGHT CCA MID SYS: 97 CM/S
RIGHT CCA PROX DIAS: 17 CM/S
RIGHT CCA PROX SYS: 111 CM/S
RIGHT ECA DIAS: 8 CM/S
RIGHT ECA SYS: 186 CM/S
RIGHT ICA DIST DIAS: 19 CM/S
RIGHT ICA DIST SYS: 111 CM/S
RIGHT ICA MID DIAS: 17 CM/S
RIGHT ICA MID SYS: 109 CM/S
RIGHT ICA PROX DIAS: 13 CM/S
RIGHT ICA PROX SYS: 117 CM/S
RIGHT VERTEBRAL DIAS: 28 CM/S
RIGHT VERTEBRAL SYS: 73 CM/S

## 2021-05-14 PROCEDURE — 93880 EXTRACRANIAL BILAT STUDY: CPT | Mod: 26,,, | Performed by: INTERNAL MEDICINE

## 2021-05-14 PROCEDURE — 93880 CV US DOPPLER CAROTID (CUPID ONLY): ICD-10-PCS | Mod: 26,,, | Performed by: INTERNAL MEDICINE

## 2021-05-14 PROCEDURE — 93880 EXTRACRANIAL BILAT STUDY: CPT

## 2021-05-17 ENCOUNTER — TELEPHONE (OUTPATIENT)
Dept: INTERNAL MEDICINE | Facility: CLINIC | Age: 81
End: 2021-05-17

## 2021-05-17 ENCOUNTER — TELEPHONE (OUTPATIENT)
Dept: CARDIOLOGY | Facility: CLINIC | Age: 81
End: 2021-05-17

## 2021-05-17 DIAGNOSIS — Z12.31 VISIT FOR SCREENING MAMMOGRAM: Primary | ICD-10-CM

## 2021-05-24 ENCOUNTER — HOSPITAL ENCOUNTER (OUTPATIENT)
Dept: RADIOLOGY | Facility: HOSPITAL | Age: 81
Discharge: HOME | End: 2021-05-24
Attending: INTERNAL MEDICINE
Payer: MEDICARE

## 2021-05-24 VITALS — WEIGHT: 190 LBS | BODY MASS INDEX: 30.53 KG/M2 | HEIGHT: 66 IN

## 2021-05-24 DIAGNOSIS — Z12.31 VISIT FOR SCREENING MAMMOGRAM: ICD-10-CM

## 2021-05-24 PROCEDURE — 77067 SCR MAMMO BI INCL CAD: CPT | Mod: TC,PO

## 2021-05-24 PROCEDURE — 77067 SCR MAMMO BI INCL CAD: CPT | Mod: 26,,, | Performed by: RADIOLOGY

## 2021-05-24 PROCEDURE — 77063 MAMMO DIGITAL SCREENING BILAT WITH TOMO: ICD-10-PCS | Mod: 26,,, | Performed by: RADIOLOGY

## 2021-05-24 PROCEDURE — 77067 MAMMO DIGITAL SCREENING BILAT WITH TOMO: ICD-10-PCS | Mod: 26,,, | Performed by: RADIOLOGY

## 2021-05-24 PROCEDURE — 77063 BREAST TOMOSYNTHESIS BI: CPT | Mod: 26,,, | Performed by: RADIOLOGY

## 2021-05-25 ENCOUNTER — TELEPHONE (OUTPATIENT)
Dept: INTERNAL MEDICINE | Facility: CLINIC | Age: 81
End: 2021-05-25

## 2021-06-10 ENCOUNTER — CLINICAL SUPPORT (OUTPATIENT)
Dept: AUDIOLOGY | Facility: CLINIC | Age: 81
End: 2021-06-10
Payer: MEDICARE

## 2021-06-10 ENCOUNTER — OFFICE VISIT (OUTPATIENT)
Dept: OTOLARYNGOLOGY | Facility: CLINIC | Age: 81
End: 2021-06-10
Payer: MEDICARE

## 2021-06-10 VITALS
DIASTOLIC BLOOD PRESSURE: 84 MMHG | HEIGHT: 66 IN | BODY MASS INDEX: 29.87 KG/M2 | SYSTOLIC BLOOD PRESSURE: 124 MMHG | WEIGHT: 185.88 LBS

## 2021-06-10 DIAGNOSIS — E04.1 THYROID NODULE: ICD-10-CM

## 2021-06-10 DIAGNOSIS — H93.13 TINNITUS OF BOTH EARS: ICD-10-CM

## 2021-06-10 DIAGNOSIS — H60.8X3 CHRONIC ECZEMATOUS OTITIS EXTERNA OF BOTH EARS: ICD-10-CM

## 2021-06-10 DIAGNOSIS — H81.13 BENIGN PAROXYSMAL POSITIONAL VERTIGO DUE TO BILATERAL VESTIBULAR DISORDER: Primary | ICD-10-CM

## 2021-06-10 DIAGNOSIS — H90.3 SENSORINEURAL HEARING LOSS, BILATERAL: Primary | ICD-10-CM

## 2021-06-10 DIAGNOSIS — R42 DIZZINESS: ICD-10-CM

## 2021-06-10 PROCEDURE — 1126F AMNT PAIN NOTED NONE PRSNT: CPT | Mod: S$GLB,,, | Performed by: OTOLARYNGOLOGY

## 2021-06-10 PROCEDURE — 92550 TYMPANOMETRY & REFLEX THRESH: CPT | Mod: S$GLB,,, | Performed by: AUDIOLOGIST

## 2021-06-10 PROCEDURE — 1101F PT FALLS ASSESS-DOCD LE1/YR: CPT | Mod: CPTII,S$GLB,, | Performed by: OTOLARYNGOLOGY

## 2021-06-10 PROCEDURE — 1101F PR PT FALLS ASSESS DOC 0-1 FALLS W/OUT INJ PAST YR: ICD-10-PCS | Mod: CPTII,S$GLB,, | Performed by: OTOLARYNGOLOGY

## 2021-06-10 PROCEDURE — 3288F PR FALLS RISK ASSESSMENT DOCUMENTED: ICD-10-PCS | Mod: CPTII,S$GLB,, | Performed by: OTOLARYNGOLOGY

## 2021-06-10 PROCEDURE — 99215 PR OFFICE/OUTPT VISIT, EST, LEVL V, 40-54 MIN: ICD-10-PCS | Mod: 25,S$GLB,, | Performed by: OTOLARYNGOLOGY

## 2021-06-10 PROCEDURE — 95992 PR CANALITH REPOSITIONING PROCEDURE, PER DAY: ICD-10-PCS | Mod: S$GLB,,, | Performed by: OTOLARYNGOLOGY

## 2021-06-10 PROCEDURE — 1126F PR PAIN SEVERITY QUANTIFIED, NO PAIN PRESENT: ICD-10-PCS | Mod: S$GLB,,, | Performed by: OTOLARYNGOLOGY

## 2021-06-10 PROCEDURE — 95992 CANALITH REPOSITIONING PROC: CPT | Mod: S$GLB,,, | Performed by: OTOLARYNGOLOGY

## 2021-06-10 PROCEDURE — 92557 COMPREHENSIVE HEARING TEST: CPT | Mod: S$GLB,,, | Performed by: AUDIOLOGIST

## 2021-06-10 PROCEDURE — 3288F FALL RISK ASSESSMENT DOCD: CPT | Mod: CPTII,S$GLB,, | Performed by: OTOLARYNGOLOGY

## 2021-06-10 PROCEDURE — 99215 OFFICE O/P EST HI 40 MIN: CPT | Mod: 25,S$GLB,, | Performed by: OTOLARYNGOLOGY

## 2021-06-10 PROCEDURE — 92557 PR COMPREHENSIVE HEARING TEST: ICD-10-PCS | Mod: S$GLB,,, | Performed by: AUDIOLOGIST

## 2021-06-10 PROCEDURE — 92550 PR TYMPANOMETRY AND REFLEX THRESHOLD MEASUREMENTS: ICD-10-PCS | Mod: S$GLB,,, | Performed by: AUDIOLOGIST

## 2021-06-10 PROCEDURE — 1159F MED LIST DOCD IN RCRD: CPT | Mod: S$GLB,,, | Performed by: OTOLARYNGOLOGY

## 2021-06-10 PROCEDURE — 1159F PR MEDICATION LIST DOCUMENTED IN MEDICAL RECORD: ICD-10-PCS | Mod: S$GLB,,, | Performed by: OTOLARYNGOLOGY

## 2021-06-10 RX ORDER — CLOTRIMAZOLE AND BETAMETHASONE DIPROPIONATE 10; .64 MG/G; MG/G
CREAM TOPICAL 2 TIMES DAILY
Qty: 1 TUBE | Refills: 0 | Status: SHIPPED | OUTPATIENT
Start: 2021-06-10 | End: 2021-06-17

## 2021-06-14 ENCOUNTER — PES CALL (OUTPATIENT)
Dept: ADMINISTRATIVE | Facility: CLINIC | Age: 81
End: 2021-06-14

## 2021-06-25 ENCOUNTER — CLINICAL SUPPORT (OUTPATIENT)
Dept: AUDIOLOGY | Facility: CLINIC | Age: 81
End: 2021-06-25
Payer: MEDICARE

## 2021-06-25 DIAGNOSIS — H81.11 BPPV (BENIGN PAROXYSMAL POSITIONAL VERTIGO), RIGHT: Primary | ICD-10-CM

## 2021-06-25 PROCEDURE — 95992 CANALITH REPOSITIONING PROC: CPT | Mod: S$GLB,,, | Performed by: AUDIOLOGIST

## 2021-06-25 PROCEDURE — 95992 PR CANALITH REPOSITIONING PROCEDURE, PER DAY: ICD-10-PCS | Mod: S$GLB,,, | Performed by: AUDIOLOGIST

## 2021-07-01 ENCOUNTER — PATIENT MESSAGE (OUTPATIENT)
Dept: ADMINISTRATIVE | Facility: OTHER | Age: 81
End: 2021-07-01

## 2021-07-02 ENCOUNTER — PATIENT OUTREACH (OUTPATIENT)
Dept: ADMINISTRATIVE | Facility: OTHER | Age: 81
End: 2021-07-02

## 2021-07-09 ENCOUNTER — OFFICE VISIT (OUTPATIENT)
Dept: OTOLARYNGOLOGY | Facility: CLINIC | Age: 81
End: 2021-07-09
Payer: MEDICARE

## 2021-07-09 VITALS
DIASTOLIC BLOOD PRESSURE: 72 MMHG | HEIGHT: 66 IN | BODY MASS INDEX: 29.87 KG/M2 | SYSTOLIC BLOOD PRESSURE: 160 MMHG | WEIGHT: 185.88 LBS

## 2021-07-09 DIAGNOSIS — H93.13 TINNITUS OF BOTH EARS: ICD-10-CM

## 2021-07-09 DIAGNOSIS — H60.8X3 CHRONIC ECZEMATOUS OTITIS EXTERNA OF BOTH EARS: ICD-10-CM

## 2021-07-09 DIAGNOSIS — E04.2 MULTIPLE THYROID NODULES: Primary | ICD-10-CM

## 2021-07-09 DIAGNOSIS — H90.3 SENSORINEURAL HEARING LOSS, BILATERAL: ICD-10-CM

## 2021-07-09 DIAGNOSIS — H81.13 BENIGN PAROXYSMAL POSITIONAL VERTIGO DUE TO BILATERAL VESTIBULAR DISORDER: ICD-10-CM

## 2021-07-09 PROCEDURE — 1159F PR MEDICATION LIST DOCUMENTED IN MEDICAL RECORD: ICD-10-PCS | Mod: S$GLB,,, | Performed by: OTOLARYNGOLOGY

## 2021-07-09 PROCEDURE — 1101F PR PT FALLS ASSESS DOC 0-1 FALLS W/OUT INJ PAST YR: ICD-10-PCS | Mod: CPTII,S$GLB,, | Performed by: OTOLARYNGOLOGY

## 2021-07-09 PROCEDURE — 99499 RISK ADDL DX/OHS AUDIT: ICD-10-PCS | Mod: S$GLB,,, | Performed by: OTOLARYNGOLOGY

## 2021-07-09 PROCEDURE — 3288F PR FALLS RISK ASSESSMENT DOCUMENTED: ICD-10-PCS | Mod: CPTII,S$GLB,, | Performed by: OTOLARYNGOLOGY

## 2021-07-09 PROCEDURE — 1101F PT FALLS ASSESS-DOCD LE1/YR: CPT | Mod: CPTII,S$GLB,, | Performed by: OTOLARYNGOLOGY

## 2021-07-09 PROCEDURE — 3288F FALL RISK ASSESSMENT DOCD: CPT | Mod: CPTII,S$GLB,, | Performed by: OTOLARYNGOLOGY

## 2021-07-09 PROCEDURE — 1126F PR PAIN SEVERITY QUANTIFIED, NO PAIN PRESENT: ICD-10-PCS | Mod: S$GLB,,, | Performed by: OTOLARYNGOLOGY

## 2021-07-09 PROCEDURE — 99499 UNLISTED E&M SERVICE: CPT | Mod: S$GLB,,, | Performed by: OTOLARYNGOLOGY

## 2021-07-09 PROCEDURE — 1126F AMNT PAIN NOTED NONE PRSNT: CPT | Mod: S$GLB,,, | Performed by: OTOLARYNGOLOGY

## 2021-07-09 PROCEDURE — 1159F MED LIST DOCD IN RCRD: CPT | Mod: S$GLB,,, | Performed by: OTOLARYNGOLOGY

## 2021-07-09 PROCEDURE — 99213 PR OFFICE/OUTPT VISIT, EST, LEVL III, 20-29 MIN: ICD-10-PCS | Mod: S$GLB,,, | Performed by: OTOLARYNGOLOGY

## 2021-07-09 PROCEDURE — 99213 OFFICE O/P EST LOW 20 MIN: CPT | Mod: S$GLB,,, | Performed by: OTOLARYNGOLOGY

## 2021-07-16 ENCOUNTER — HOSPITAL ENCOUNTER (OUTPATIENT)
Dept: RADIOLOGY | Facility: HOSPITAL | Age: 81
Discharge: HOME | End: 2021-07-16
Attending: OTOLARYNGOLOGY
Payer: MEDICARE

## 2021-07-16 DIAGNOSIS — E04.2 MULTIPLE THYROID NODULES: ICD-10-CM

## 2021-07-16 PROCEDURE — 76536 US EXAM OF HEAD AND NECK: CPT | Mod: TC

## 2021-07-16 PROCEDURE — 76536 US EXAM OF HEAD AND NECK: CPT | Mod: 26,,, | Performed by: RADIOLOGY

## 2021-07-16 PROCEDURE — 76536 US SOFT TISSUE HEAD NECK THYROID: ICD-10-PCS | Mod: 26,,, | Performed by: RADIOLOGY

## 2021-07-20 ENCOUNTER — OFFICE VISIT (OUTPATIENT)
Dept: INTERNAL MEDICINE | Facility: CLINIC | Age: 81
End: 2021-07-20
Payer: MEDICARE

## 2021-07-20 VITALS
DIASTOLIC BLOOD PRESSURE: 60 MMHG | BODY MASS INDEX: 29.9 KG/M2 | SYSTOLIC BLOOD PRESSURE: 148 MMHG | WEIGHT: 186.06 LBS | HEART RATE: 76 BPM | RESPIRATION RATE: 16 BRPM | OXYGEN SATURATION: 98 % | TEMPERATURE: 98 F | HEIGHT: 66 IN

## 2021-07-20 DIAGNOSIS — E11.22 CKD STAGE 3 SECONDARY TO DIABETES: ICD-10-CM

## 2021-07-20 DIAGNOSIS — E11.29 TYPE 2 DIABETES MELLITUS WITH MICROALBUMINURIA, WITHOUT LONG-TERM CURRENT USE OF INSULIN: ICD-10-CM

## 2021-07-20 DIAGNOSIS — E04.2 NONTOXIC MULTINODULAR GOITER: ICD-10-CM

## 2021-07-20 DIAGNOSIS — E11.9 DM TYPE 2 WITHOUT RETINOPATHY: ICD-10-CM

## 2021-07-20 DIAGNOSIS — I15.2 HYPERTENSION ASSOCIATED WITH DIABETES: ICD-10-CM

## 2021-07-20 DIAGNOSIS — E04.1 THYROID NODULE: ICD-10-CM

## 2021-07-20 DIAGNOSIS — R35.0 URINARY FREQUENCY: ICD-10-CM

## 2021-07-20 DIAGNOSIS — I51.89 LEFT VENTRICULAR DIASTOLIC DYSFUNCTION WITH PRESERVED SYSTOLIC FUNCTION: ICD-10-CM

## 2021-07-20 DIAGNOSIS — M25.559 CHRONIC HIP PAIN, UNSPECIFIED LATERALITY: ICD-10-CM

## 2021-07-20 DIAGNOSIS — G89.29 CHRONIC HIP PAIN, UNSPECIFIED LATERALITY: ICD-10-CM

## 2021-07-20 DIAGNOSIS — E11.69 HYPERLIPIDEMIA ASSOCIATED WITH TYPE 2 DIABETES MELLITUS: ICD-10-CM

## 2021-07-20 DIAGNOSIS — Z00.00 ANNUAL PHYSICAL EXAM: Primary | ICD-10-CM

## 2021-07-20 DIAGNOSIS — E78.5 HYPERLIPIDEMIA ASSOCIATED WITH TYPE 2 DIABETES MELLITUS: ICD-10-CM

## 2021-07-20 DIAGNOSIS — I70.0 AORTIC ATHEROSCLEROSIS: ICD-10-CM

## 2021-07-20 DIAGNOSIS — R80.9 TYPE 2 DIABETES MELLITUS WITH MICROALBUMINURIA, WITHOUT LONG-TERM CURRENT USE OF INSULIN: ICD-10-CM

## 2021-07-20 DIAGNOSIS — E11.59 HYPERTENSION ASSOCIATED WITH DIABETES: ICD-10-CM

## 2021-07-20 DIAGNOSIS — I65.23 BILATERAL CAROTID ARTERY STENOSIS: ICD-10-CM

## 2021-07-20 DIAGNOSIS — N18.30 CKD STAGE 3 SECONDARY TO DIABETES: ICD-10-CM

## 2021-07-20 DIAGNOSIS — E11.59 TYPE 2 DIABETES MELLITUS WITH OTHER CIRCULATORY COMPLICATION, WITHOUT LONG-TERM CURRENT USE OF INSULIN: ICD-10-CM

## 2021-07-20 DIAGNOSIS — K21.9 GASTROESOPHAGEAL REFLUX DISEASE WITHOUT ESOPHAGITIS: ICD-10-CM

## 2021-07-20 DIAGNOSIS — Z78.0 POSTMENOPAUSAL ESTROGEN DEFICIENCY: ICD-10-CM

## 2021-07-20 LAB
BILIRUB UR QL STRIP: NEGATIVE
CLARITY UR REFRACT.AUTO: CLEAR
COLOR UR AUTO: YELLOW
GLUCOSE UR QL STRIP: NEGATIVE
HGB UR QL STRIP: NEGATIVE
KETONES UR QL STRIP: NEGATIVE
LEUKOCYTE ESTERASE UR QL STRIP: NEGATIVE
NITRITE UR QL STRIP: NEGATIVE
PH UR STRIP: 6 [PH] (ref 5–8)
PROT UR QL STRIP: NEGATIVE
SP GR UR STRIP: 1.01 (ref 1–1.03)
URN SPEC COLLECT METH UR: NORMAL

## 2021-07-20 PROCEDURE — 99499 RISK ADDL DX/OHS AUDIT: ICD-10-PCS | Mod: S$GLB,,, | Performed by: INTERNAL MEDICINE

## 2021-07-20 PROCEDURE — 99397 PR PREVENTIVE VISIT,EST,65 & OVER: ICD-10-PCS | Mod: S$GLB,,, | Performed by: INTERNAL MEDICINE

## 2021-07-20 PROCEDURE — 1126F PR PAIN SEVERITY QUANTIFIED, NO PAIN PRESENT: ICD-10-PCS | Mod: CPTII,S$GLB,, | Performed by: INTERNAL MEDICINE

## 2021-07-20 PROCEDURE — 99397 PER PM REEVAL EST PAT 65+ YR: CPT | Mod: S$GLB,,, | Performed by: INTERNAL MEDICINE

## 2021-07-20 PROCEDURE — 3078F DIAST BP <80 MM HG: CPT | Mod: CPTII,S$GLB,, | Performed by: INTERNAL MEDICINE

## 2021-07-20 PROCEDURE — 3077F PR MOST RECENT SYSTOLIC BLOOD PRESSURE >= 140 MM HG: ICD-10-PCS | Mod: CPTII,S$GLB,, | Performed by: INTERNAL MEDICINE

## 2021-07-20 PROCEDURE — 99499 UNLISTED E&M SERVICE: CPT | Mod: S$GLB,,, | Performed by: INTERNAL MEDICINE

## 2021-07-20 PROCEDURE — 1101F PT FALLS ASSESS-DOCD LE1/YR: CPT | Mod: CPTII,S$GLB,, | Performed by: INTERNAL MEDICINE

## 2021-07-20 PROCEDURE — 87086 URINE CULTURE/COLONY COUNT: CPT | Performed by: INTERNAL MEDICINE

## 2021-07-20 PROCEDURE — 1101F PR PT FALLS ASSESS DOC 0-1 FALLS W/OUT INJ PAST YR: ICD-10-PCS | Mod: CPTII,S$GLB,, | Performed by: INTERNAL MEDICINE

## 2021-07-20 PROCEDURE — 3288F FALL RISK ASSESSMENT DOCD: CPT | Mod: CPTII,S$GLB,, | Performed by: INTERNAL MEDICINE

## 2021-07-20 PROCEDURE — 3078F PR MOST RECENT DIASTOLIC BLOOD PRESSURE < 80 MM HG: ICD-10-PCS | Mod: CPTII,S$GLB,, | Performed by: INTERNAL MEDICINE

## 2021-07-20 PROCEDURE — 81003 URINALYSIS AUTO W/O SCOPE: CPT | Performed by: INTERNAL MEDICINE

## 2021-07-20 PROCEDURE — 99999 PR PBB SHADOW E&M-EST. PATIENT-LVL V: ICD-10-PCS | Mod: PBBFAC,,, | Performed by: INTERNAL MEDICINE

## 2021-07-20 PROCEDURE — 99999 PR PBB SHADOW E&M-EST. PATIENT-LVL V: CPT | Mod: PBBFAC,,, | Performed by: INTERNAL MEDICINE

## 2021-07-20 PROCEDURE — 3288F PR FALLS RISK ASSESSMENT DOCUMENTED: ICD-10-PCS | Mod: CPTII,S$GLB,, | Performed by: INTERNAL MEDICINE

## 2021-07-20 PROCEDURE — 3077F SYST BP >= 140 MM HG: CPT | Mod: CPTII,S$GLB,, | Performed by: INTERNAL MEDICINE

## 2021-07-20 PROCEDURE — 1126F AMNT PAIN NOTED NONE PRSNT: CPT | Mod: CPTII,S$GLB,, | Performed by: INTERNAL MEDICINE

## 2021-07-20 RX ORDER — EMPAGLIFLOZIN 10 MG/1
10 TABLET, FILM COATED ORAL DAILY
Qty: 90 TABLET | Refills: 3 | Status: SHIPPED | OUTPATIENT
Start: 2021-07-20 | End: 2022-03-07

## 2021-07-21 LAB — BACTERIA UR CULT: NO GROWTH

## 2021-07-21 RX ORDER — METFORMIN HYDROCHLORIDE 500 MG/1
500 TABLET ORAL
Qty: 90 TABLET | Refills: 3 | Status: SHIPPED | OUTPATIENT
Start: 2021-07-21 | End: 2022-03-07 | Stop reason: ALTCHOICE

## 2021-07-21 RX ORDER — AMLODIPINE BESYLATE 10 MG/1
TABLET ORAL
Qty: 90 TABLET | Refills: 3 | Status: SHIPPED | OUTPATIENT
Start: 2021-07-21 | End: 2022-06-20

## 2021-07-22 ENCOUNTER — TELEPHONE (OUTPATIENT)
Dept: INTERNAL MEDICINE | Facility: CLINIC | Age: 81
End: 2021-07-22

## 2021-07-27 ENCOUNTER — PES CALL (OUTPATIENT)
Dept: ADMINISTRATIVE | Facility: CLINIC | Age: 81
End: 2021-07-27

## 2021-07-28 ENCOUNTER — HOSPITAL ENCOUNTER (OUTPATIENT)
Dept: RADIOLOGY | Facility: HOSPITAL | Age: 81
Discharge: HOME | End: 2021-07-28
Attending: INTERNAL MEDICINE
Payer: MEDICARE

## 2021-07-28 DIAGNOSIS — M25.559 CHRONIC HIP PAIN, UNSPECIFIED LATERALITY: ICD-10-CM

## 2021-07-28 DIAGNOSIS — G89.29 CHRONIC HIP PAIN, UNSPECIFIED LATERALITY: ICD-10-CM

## 2021-07-28 PROCEDURE — 73521 X-RAY EXAM HIPS BI 2 VIEWS: CPT | Mod: 26,,, | Performed by: RADIOLOGY

## 2021-07-28 PROCEDURE — 73521 XR HIPS BILATERAL 2 VIEW INCL AP PELVIS: ICD-10-PCS | Mod: 26,,, | Performed by: RADIOLOGY

## 2021-07-28 PROCEDURE — 73521 X-RAY EXAM HIPS BI 2 VIEWS: CPT | Mod: TC,FY,PO

## 2021-07-29 ENCOUNTER — TELEPHONE (OUTPATIENT)
Dept: INTERNAL MEDICINE | Facility: CLINIC | Age: 81
End: 2021-07-29

## 2021-07-29 DIAGNOSIS — M25.559 HIP PAIN, CHRONIC, UNSPECIFIED LATERALITY: Primary | ICD-10-CM

## 2021-07-29 DIAGNOSIS — G89.29 HIP PAIN, CHRONIC, UNSPECIFIED LATERALITY: Primary | ICD-10-CM

## 2021-07-30 ENCOUNTER — TELEPHONE (OUTPATIENT)
Dept: INTERNAL MEDICINE | Facility: CLINIC | Age: 81
End: 2021-07-30

## 2021-08-05 ENCOUNTER — OFFICE VISIT (OUTPATIENT)
Dept: HOME HEALTH SERVICES | Facility: CLINIC | Age: 81
End: 2021-08-05
Payer: MEDICARE

## 2021-08-05 VITALS
DIASTOLIC BLOOD PRESSURE: 68 MMHG | HEIGHT: 66 IN | SYSTOLIC BLOOD PRESSURE: 142 MMHG | WEIGHT: 180 LBS | HEART RATE: 70 BPM | TEMPERATURE: 97 F | BODY MASS INDEX: 28.93 KG/M2

## 2021-08-05 DIAGNOSIS — Z95.2 S/P AVR (AORTIC VALVE REPLACEMENT): ICD-10-CM

## 2021-08-05 DIAGNOSIS — M85.80 OSTEOPENIA, UNSPECIFIED LOCATION: ICD-10-CM

## 2021-08-05 DIAGNOSIS — E11.59 HYPERTENSION ASSOCIATED WITH DIABETES: ICD-10-CM

## 2021-08-05 DIAGNOSIS — E11.69 HYPERLIPIDEMIA ASSOCIATED WITH TYPE 2 DIABETES MELLITUS: ICD-10-CM

## 2021-08-05 DIAGNOSIS — I15.2 OBESITY, DIABETES, AND HYPERTENSION SYNDROME: ICD-10-CM

## 2021-08-05 DIAGNOSIS — I35.0 NONRHEUMATIC AORTIC VALVE STENOSIS: ICD-10-CM

## 2021-08-05 DIAGNOSIS — E04.2 NONTOXIC MULTINODULAR GOITER: ICD-10-CM

## 2021-08-05 DIAGNOSIS — K21.9 GASTROESOPHAGEAL REFLUX DISEASE WITHOUT ESOPHAGITIS: ICD-10-CM

## 2021-08-05 DIAGNOSIS — E78.5 HYPERLIPIDEMIA ASSOCIATED WITH TYPE 2 DIABETES MELLITUS: ICD-10-CM

## 2021-08-05 DIAGNOSIS — E11.59 OBESITY, DIABETES, AND HYPERTENSION SYNDROME: ICD-10-CM

## 2021-08-05 DIAGNOSIS — E66.9 OBESITY, DIABETES, AND HYPERTENSION SYNDROME: ICD-10-CM

## 2021-08-05 DIAGNOSIS — E11.59 TYPE 2 DIABETES MELLITUS WITH OTHER CIRCULATORY COMPLICATION, WITHOUT LONG-TERM CURRENT USE OF INSULIN: ICD-10-CM

## 2021-08-05 DIAGNOSIS — E11.29 TYPE 2 DIABETES MELLITUS WITH MICROALBUMINURIA, WITHOUT LONG-TERM CURRENT USE OF INSULIN: ICD-10-CM

## 2021-08-05 DIAGNOSIS — E11.22 CKD STAGE 3 SECONDARY TO DIABETES: ICD-10-CM

## 2021-08-05 DIAGNOSIS — I70.0 AORTIC ATHEROSCLEROSIS: ICD-10-CM

## 2021-08-05 DIAGNOSIS — H90.3 SENSORINEURAL HEARING LOSS (SNHL), BILATERAL: ICD-10-CM

## 2021-08-05 DIAGNOSIS — Z00.00 ENCOUNTER FOR PREVENTIVE HEALTH EXAMINATION: Primary | ICD-10-CM

## 2021-08-05 DIAGNOSIS — R80.9 TYPE 2 DIABETES MELLITUS WITH MICROALBUMINURIA, WITHOUT LONG-TERM CURRENT USE OF INSULIN: ICD-10-CM

## 2021-08-05 DIAGNOSIS — I65.23 BILATERAL CAROTID ARTERY STENOSIS: ICD-10-CM

## 2021-08-05 DIAGNOSIS — I51.89 LEFT VENTRICULAR DIASTOLIC DYSFUNCTION WITH PRESERVED SYSTOLIC FUNCTION: ICD-10-CM

## 2021-08-05 DIAGNOSIS — E66.3 OVERWEIGHT (BMI 25.0-29.9): ICD-10-CM

## 2021-08-05 DIAGNOSIS — E11.9 DM TYPE 2 WITHOUT RETINOPATHY: ICD-10-CM

## 2021-08-05 DIAGNOSIS — E11.69 OBESITY, DIABETES, AND HYPERTENSION SYNDROME: ICD-10-CM

## 2021-08-05 DIAGNOSIS — I15.2 HYPERTENSION ASSOCIATED WITH DIABETES: ICD-10-CM

## 2021-08-05 DIAGNOSIS — N18.30 CKD STAGE 3 SECONDARY TO DIABETES: ICD-10-CM

## 2021-08-05 PROCEDURE — 3077F SYST BP >= 140 MM HG: CPT | Mod: CPTII,S$GLB,, | Performed by: NURSE PRACTITIONER

## 2021-08-05 PROCEDURE — 1160F PR REVIEW ALL MEDS BY PRESCRIBER/CLIN PHARMACIST DOCUMENTED: ICD-10-PCS | Mod: CPTII,S$GLB,, | Performed by: NURSE PRACTITIONER

## 2021-08-05 PROCEDURE — G0439 PPPS, SUBSEQ VISIT: HCPCS | Mod: S$GLB,,, | Performed by: NURSE PRACTITIONER

## 2021-08-05 PROCEDURE — 1160F RVW MEDS BY RX/DR IN RCRD: CPT | Mod: CPTII,S$GLB,, | Performed by: NURSE PRACTITIONER

## 2021-08-05 PROCEDURE — 1101F PT FALLS ASSESS-DOCD LE1/YR: CPT | Mod: CPTII,S$GLB,, | Performed by: NURSE PRACTITIONER

## 2021-08-05 PROCEDURE — 3078F DIAST BP <80 MM HG: CPT | Mod: CPTII,S$GLB,, | Performed by: NURSE PRACTITIONER

## 2021-08-05 PROCEDURE — 3288F PR FALLS RISK ASSESSMENT DOCUMENTED: ICD-10-PCS | Mod: CPTII,S$GLB,, | Performed by: NURSE PRACTITIONER

## 2021-08-05 PROCEDURE — 99499 UNLISTED E&M SERVICE: CPT | Mod: S$GLB,,, | Performed by: NURSE PRACTITIONER

## 2021-08-05 PROCEDURE — G0439 PR MEDICARE ANNUAL WELLNESS SUBSEQUENT VISIT: ICD-10-PCS | Mod: S$GLB,,, | Performed by: NURSE PRACTITIONER

## 2021-08-05 PROCEDURE — 3077F PR MOST RECENT SYSTOLIC BLOOD PRESSURE >= 140 MM HG: ICD-10-PCS | Mod: CPTII,S$GLB,, | Performed by: NURSE PRACTITIONER

## 2021-08-05 PROCEDURE — 1159F MED LIST DOCD IN RCRD: CPT | Mod: CPTII,S$GLB,, | Performed by: NURSE PRACTITIONER

## 2021-08-05 PROCEDURE — 1126F AMNT PAIN NOTED NONE PRSNT: CPT | Mod: CPTII,S$GLB,, | Performed by: NURSE PRACTITIONER

## 2021-08-05 PROCEDURE — 3078F PR MOST RECENT DIASTOLIC BLOOD PRESSURE < 80 MM HG: ICD-10-PCS | Mod: CPTII,S$GLB,, | Performed by: NURSE PRACTITIONER

## 2021-08-05 PROCEDURE — 1101F PR PT FALLS ASSESS DOC 0-1 FALLS W/OUT INJ PAST YR: ICD-10-PCS | Mod: CPTII,S$GLB,, | Performed by: NURSE PRACTITIONER

## 2021-08-05 PROCEDURE — 1126F PR PAIN SEVERITY QUANTIFIED, NO PAIN PRESENT: ICD-10-PCS | Mod: CPTII,S$GLB,, | Performed by: NURSE PRACTITIONER

## 2021-08-05 PROCEDURE — 99499 RISK ADDL DX/OHS AUDIT: ICD-10-PCS | Mod: S$GLB,,, | Performed by: NURSE PRACTITIONER

## 2021-08-05 PROCEDURE — 1159F PR MEDICATION LIST DOCUMENTED IN MEDICAL RECORD: ICD-10-PCS | Mod: CPTII,S$GLB,, | Performed by: NURSE PRACTITIONER

## 2021-08-05 PROCEDURE — 3288F FALL RISK ASSESSMENT DOCD: CPT | Mod: CPTII,S$GLB,, | Performed by: NURSE PRACTITIONER

## 2021-08-16 NOTE — TELEPHONE ENCOUNTER
Per Dr. Gray pt should discontinue Lisinopril 10mg and start Losartan 25mg. I spoke w/Kimberley at [facility] and advised her to discontinue Lisinopril 10mg, verbalized understanding. Pt notified, verbalized understanding.   
none

## 2021-08-18 DIAGNOSIS — E04.2 NON-TOXIC MULTINODULAR GOITER: Primary | ICD-10-CM
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
## 2021-09-14 ENCOUNTER — HOSPITAL ENCOUNTER (OUTPATIENT)
Dept: RADIOLOGY | Facility: CLINIC | Age: 81
Discharge: HOME | End: 2021-09-14
Attending: INTERNAL MEDICINE
Payer: MEDICARE

## 2021-09-14 DIAGNOSIS — Z78.0 POSTMENOPAUSAL ESTROGEN DEFICIENCY: ICD-10-CM

## 2021-09-14 PROCEDURE — 77080 DXA BONE DENSITY AXIAL: CPT | Mod: 26,,, | Performed by: INTERNAL MEDICINE

## 2021-09-14 PROCEDURE — 77080 DEXA BONE DENSITY SPINE HIP: ICD-10-PCS | Mod: 26,,, | Performed by: INTERNAL MEDICINE

## 2021-09-14 PROCEDURE — 77080 DXA BONE DENSITY AXIAL: CPT | Mod: TC,PO

## 2021-09-15 ENCOUNTER — TELEPHONE (OUTPATIENT)
Dept: PRIMARY CARE CLINIC | Facility: CLINIC | Age: 81
End: 2021-09-15

## 2021-11-17 RX ORDER — ATORVASTATIN CALCIUM 40 MG/1
40 TABLET, FILM COATED ORAL NIGHTLY
Qty: 90 TABLET | Refills: 3 | Status: SHIPPED | OUTPATIENT
Start: 2021-11-17 | End: 2022-06-15 | Stop reason: DRUGHIGH

## 2021-11-17 RX ORDER — ATORVASTATIN CALCIUM 40 MG/1
40 TABLET, FILM COATED ORAL NIGHTLY
Qty: 15 TABLET | Refills: 0 | Status: SHIPPED | OUTPATIENT
Start: 2021-11-17 | End: 2021-11-17 | Stop reason: SDUPTHER

## 2022-01-14 ENCOUNTER — TELEPHONE (OUTPATIENT)
Dept: INTERNAL MEDICINE | Facility: CLINIC | Age: 82
End: 2022-01-14
Payer: MEDICARE

## 2022-01-14 RX ORDER — METOPROLOL TARTRATE 25 MG/1
25 TABLET, FILM COATED ORAL 2 TIMES DAILY
Qty: 180 TABLET | Refills: 3 | Status: SHIPPED | OUTPATIENT
Start: 2022-01-14 | End: 2022-11-11

## 2022-01-14 NOTE — TELEPHONE ENCOUNTER
----- Message from Poppy Hernandez MA sent at 1/14/2022  1:40 PM CST -----  Contact: Pt @ [phone]    ----- Message -----  From: Sylvia Palma  Sent: 1/14/2022   1:28 PM CST  To: Malka Ibrahim Staff    Requesting an RX refill or new RX.  Is this a refill or new RX: refill  RX name and strength (copy/paste from chart):  metoprolol tartrate (LOPRESSOR) 25 MG tablet  Is this a 30 day or 90 day RX: 90  Patient advised that in the future they can use their [facility] account to request a refill?:  yes  Patient advised that RX refills can take up to 72 hours?:  yes  Pharmacy name and phone # (copy/paste from chart):  [facility] - [place], [place] - [address]  Comments:   Pt is unable to reach the ordering doctor.  Please call and advise.    Thank you and have a great day.

## 2022-01-17 ENCOUNTER — PATIENT MESSAGE (OUTPATIENT)
Dept: ADMINISTRATIVE | Facility: HOSPITAL | Age: 82
End: 2022-01-17
Payer: MEDICARE

## 2022-02-21 DIAGNOSIS — E04.2 NON-TOXIC MULTINODULAR GOITER: Primary | ICD-10-CM

## 2022-02-21 LAB — A1C: 7.5

## 2022-03-07 ENCOUNTER — LAB VISIT (OUTPATIENT)
Dept: LAB | Facility: HOSPITAL | Age: 82
End: 2022-03-07
Attending: INTERNAL MEDICINE
Payer: MEDICARE

## 2022-03-07 ENCOUNTER — PATIENT OUTREACH (OUTPATIENT)
Dept: ADMINISTRATIVE | Facility: HOSPITAL | Age: 82
End: 2022-03-07
Payer: MEDICARE

## 2022-03-07 ENCOUNTER — OFFICE VISIT (OUTPATIENT)
Dept: INTERNAL MEDICINE | Facility: CLINIC | Age: 82
End: 2022-03-07
Payer: MEDICARE

## 2022-03-07 VITALS
HEART RATE: 65 BPM | OXYGEN SATURATION: 97 % | BODY MASS INDEX: 28.27 KG/M2 | DIASTOLIC BLOOD PRESSURE: 58 MMHG | RESPIRATION RATE: 16 BRPM | TEMPERATURE: 98 F | HEIGHT: 66 IN | SYSTOLIC BLOOD PRESSURE: 148 MMHG | WEIGHT: 175.94 LBS

## 2022-03-07 DIAGNOSIS — E78.5 HYPERLIPIDEMIA ASSOCIATED WITH TYPE 2 DIABETES MELLITUS: ICD-10-CM

## 2022-03-07 DIAGNOSIS — I15.2 HYPERTENSION ASSOCIATED WITH DIABETES: ICD-10-CM

## 2022-03-07 DIAGNOSIS — I65.23 BILATERAL CAROTID ARTERY OCCLUSION: ICD-10-CM

## 2022-03-07 DIAGNOSIS — E11.22 CKD STAGE 3 SECONDARY TO DIABETES: ICD-10-CM

## 2022-03-07 DIAGNOSIS — N18.30 CKD STAGE 3 SECONDARY TO DIABETES: ICD-10-CM

## 2022-03-07 DIAGNOSIS — E11.29 TYPE 2 DIABETES MELLITUS WITH MICROALBUMINURIA, WITHOUT LONG-TERM CURRENT USE OF INSULIN: ICD-10-CM

## 2022-03-07 DIAGNOSIS — R80.9 TYPE 2 DIABETES MELLITUS WITH MICROALBUMINURIA, WITHOUT LONG-TERM CURRENT USE OF INSULIN: ICD-10-CM

## 2022-03-07 DIAGNOSIS — I70.0 AORTIC ATHEROSCLEROSIS: ICD-10-CM

## 2022-03-07 DIAGNOSIS — E11.69 HYPERLIPIDEMIA ASSOCIATED WITH TYPE 2 DIABETES MELLITUS: ICD-10-CM

## 2022-03-07 DIAGNOSIS — E11.59 HYPERTENSION ASSOCIATED WITH DIABETES: ICD-10-CM

## 2022-03-07 DIAGNOSIS — Z00.00 ANNUAL PHYSICAL EXAM: Primary | ICD-10-CM

## 2022-03-07 LAB
ALBUMIN SERPL BCP-MCNC: 4.3 G/DL (ref 3.5–5.2)
ALP SERPL-CCNC: 49 U/L (ref 55–135)
ALT SERPL W/O P-5'-P-CCNC: 29 U/L (ref 10–44)
ANION GAP SERPL CALC-SCNC: 15 MMOL/L (ref 8–16)
AST SERPL-CCNC: 25 U/L (ref 10–40)
BASOPHILS # BLD AUTO: 0.06 K/UL (ref 0–0.2)
BASOPHILS NFR BLD: 0.6 % (ref 0–1.9)
BILIRUB SERPL-MCNC: 0.5 MG/DL (ref 0.1–1)
BUN SERPL-MCNC: 24 MG/DL (ref 8–23)
CALCIUM SERPL-MCNC: 10.1 MG/DL (ref 8.7–10.5)
CHLORIDE SERPL-SCNC: 104 MMOL/L (ref 95–110)
CHOLEST SERPL-MCNC: 231 MG/DL (ref 120–199)
CHOLEST/HDLC SERPL: 4.3 {RATIO} (ref 2–5)
CO2 SERPL-SCNC: 22 MMOL/L (ref 23–29)
CREAT SERPL-MCNC: 1.2 MG/DL (ref 0.5–1.4)
DIFFERENTIAL METHOD: NORMAL
EOSINOPHIL # BLD AUTO: 0.2 K/UL (ref 0–0.5)
EOSINOPHIL NFR BLD: 1.9 % (ref 0–8)
ERYTHROCYTE [DISTWIDTH] IN BLOOD BY AUTOMATED COUNT: 13 % (ref 11.5–14.5)
EST. GFR  (AFRICAN AMERICAN): 49 ML/MIN/1.73 M^2
EST. GFR  (NON AFRICAN AMERICAN): 42.5 ML/MIN/1.73 M^2
GLUCOSE SERPL-MCNC: 90 MG/DL (ref 70–110)
HCT VFR BLD AUTO: 41.2 % (ref 37–48.5)
HDLC SERPL-MCNC: 54 MG/DL (ref 40–75)
HDLC SERPL: 23.4 % (ref 20–50)
HGB BLD-MCNC: 13.6 G/DL (ref 12–16)
IMM GRANULOCYTES # BLD AUTO: 0.03 K/UL (ref 0–0.04)
IMM GRANULOCYTES NFR BLD AUTO: 0.3 % (ref 0–0.5)
LDLC SERPL CALC-MCNC: 130.4 MG/DL (ref 63–159)
LYMPHOCYTES # BLD AUTO: 2.7 K/UL (ref 1–4.8)
LYMPHOCYTES NFR BLD: 27.9 % (ref 18–48)
MCH RBC QN AUTO: 29.9 PG (ref 27–31)
MCHC RBC AUTO-ENTMCNC: 33 G/DL (ref 32–36)
MCV RBC AUTO: 91 FL (ref 82–98)
MONOCYTES # BLD AUTO: 0.7 K/UL (ref 0.3–1)
MONOCYTES NFR BLD: 7.1 % (ref 4–15)
NEUTROPHILS # BLD AUTO: 5.9 K/UL (ref 1.8–7.7)
NEUTROPHILS NFR BLD: 62.2 % (ref 38–73)
NONHDLC SERPL-MCNC: 177 MG/DL
NRBC BLD-RTO: 0 /100 WBC
PLATELET # BLD AUTO: 319 K/UL (ref 150–450)
PMV BLD AUTO: 11.4 FL (ref 9.2–12.9)
POTASSIUM SERPL-SCNC: 4.4 MMOL/L (ref 3.5–5.1)
PROT SERPL-MCNC: 7.6 G/DL (ref 6–8.4)
RBC # BLD AUTO: 4.55 M/UL (ref 4–5.4)
SODIUM SERPL-SCNC: 141 MMOL/L (ref 136–145)
TRIGL SERPL-MCNC: 233 MG/DL (ref 30–150)
TSH SERPL DL<=0.005 MIU/L-ACNC: 2.47 UIU/ML (ref 0.4–4)
WBC # BLD AUTO: 9.56 K/UL (ref 3.9–12.7)

## 2022-03-07 PROCEDURE — 3051F HG A1C>EQUAL 7.0%<8.0%: CPT | Mod: CPTII,GC,S$GLB, | Performed by: STUDENT IN AN ORGANIZED HEALTH CARE EDUCATION/TRAINING PROGRAM

## 2022-03-07 PROCEDURE — 99999 PR PBB SHADOW E&M-EST. PATIENT-LVL IV: ICD-10-PCS | Mod: PBBFAC,GC,, | Performed by: STUDENT IN AN ORGANIZED HEALTH CARE EDUCATION/TRAINING PROGRAM

## 2022-03-07 PROCEDURE — 99499 RISK ADDL DX/OHS AUDIT: ICD-10-PCS | Mod: S$GLB,,, | Performed by: STUDENT IN AN ORGANIZED HEALTH CARE EDUCATION/TRAINING PROGRAM

## 2022-03-07 PROCEDURE — 99499 UNLISTED E&M SERVICE: CPT | Mod: S$GLB,,, | Performed by: STUDENT IN AN ORGANIZED HEALTH CARE EDUCATION/TRAINING PROGRAM

## 2022-03-07 PROCEDURE — 99397 PER PM REEVAL EST PAT 65+ YR: CPT | Mod: GC,S$GLB,, | Performed by: STUDENT IN AN ORGANIZED HEALTH CARE EDUCATION/TRAINING PROGRAM

## 2022-03-07 PROCEDURE — 80053 COMPREHEN METABOLIC PANEL: CPT | Performed by: INTERNAL MEDICINE

## 2022-03-07 PROCEDURE — 3051F PR MOST RECENT HEMOGLOBIN A1C LEVEL 7.0 - < 8.0%: ICD-10-PCS | Mod: CPTII,GC,S$GLB, | Performed by: STUDENT IN AN ORGANIZED HEALTH CARE EDUCATION/TRAINING PROGRAM

## 2022-03-07 PROCEDURE — 80061 LIPID PANEL: CPT | Performed by: INTERNAL MEDICINE

## 2022-03-07 PROCEDURE — 99397 PR PREVENTIVE VISIT,EST,65 & OVER: ICD-10-PCS | Mod: GC,S$GLB,, | Performed by: STUDENT IN AN ORGANIZED HEALTH CARE EDUCATION/TRAINING PROGRAM

## 2022-03-07 PROCEDURE — 36415 COLL VENOUS BLD VENIPUNCTURE: CPT | Mod: PO | Performed by: INTERNAL MEDICINE

## 2022-03-07 PROCEDURE — 84443 ASSAY THYROID STIM HORMONE: CPT | Performed by: INTERNAL MEDICINE

## 2022-03-07 PROCEDURE — 99999 PR PBB SHADOW E&M-EST. PATIENT-LVL IV: CPT | Mod: PBBFAC,GC,, | Performed by: STUDENT IN AN ORGANIZED HEALTH CARE EDUCATION/TRAINING PROGRAM

## 2022-03-07 PROCEDURE — 85025 COMPLETE CBC W/AUTO DIFF WBC: CPT | Performed by: INTERNAL MEDICINE

## 2022-03-07 RX ORDER — LOSARTAN POTASSIUM 100 MG/1
100 TABLET ORAL DAILY
Qty: 90 TABLET | Refills: 3 | Status: SHIPPED | OUTPATIENT
Start: 2022-03-07 | End: 2022-12-26

## 2022-03-07 RX ORDER — HYDROCHLOROTHIAZIDE 50 MG/1
50 TABLET ORAL DAILY
Qty: 90 TABLET | Refills: 3 | Status: SHIPPED | OUTPATIENT
Start: 2022-03-07 | End: 2022-05-24

## 2022-03-07 RX ORDER — FENOFIBRATE 134 MG/1
134 CAPSULE ORAL
Qty: 90 CAPSULE | Refills: 3 | Status: SHIPPED | OUTPATIENT
Start: 2022-03-07 | End: 2022-12-26

## 2022-03-07 RX ORDER — EMPAGLIFLOZIN AND METFORMIN HYDROCHLORIDE 5; 1000 MG/1; MG/1
10-1000 TABLET ORAL DAILY
COMMUNITY
End: 2022-03-07 | Stop reason: CLARIF

## 2022-03-07 NOTE — PROGRESS NOTES
I have interviewed and examined the patient w/ the resident, Dr. GIllies  I agree w/ the impression and plan as outlined above.     Karina Cabrales MD- Staff

## 2022-03-07 NOTE — PROGRESS NOTES
Chart reviewed  Immunizations reconciled   Updated A1c  Release sent to obtain recent eye exam

## 2022-03-07 NOTE — PROGRESS NOTES
Clinic Note  3/7/2022      Subjective:     Chief Complaint: Annual exam  HPI: The patient is a 82 y.o. female with T2DM, HTN, HLD, CKD3, HFpEF, GERD, and osteopenia being seen for an annual physical exam.     Former smoker, distant (quit 1966)  NonETOH    HEALTH MAINTENANCE:  Cholesterol/labs: ordered  HIV: UTD  HCV: UTD  C-scope: UTD  Mammo: UTD  DEXA: UTD  PAP: UTD  EYE exam: Due- appt scheduled  VACCINATIONS:  TD/TDAP: UTD  Flu: UTD  COVID-19: UTD  Pneumococcal: UTD  Shingles: UTD    HTN- blood pressure at home usually in upper 130s to lower 140s systolic. She is on lopressor 25mg, amlodipine 10mg, and HCTZ 50mg. Will do digital HTN.     T2DM and Thyroid- She states she was never able to get an appointment with endocrinology for her thyroid. She lives on the [facility] and sees a non-[facility] endocrinologist (Nancy Jaramillo at [facility]). They did a thyroid US about 6 months ago and ordered a thyroid biopsy. They got a Hgb A1c on 2/21/22 (7.5) and adjusted her anti-hyperglycemics as well. Just began Syndjardy  mg (stopped glipizide as well) 3 days ago.     Review of Systems   Constitutional: Negative for diaphoresis and fever.   HENT: Negative for congestion and sore throat.    Eyes: Negative for blurred vision and discharge.   Respiratory: Negative for cough and shortness of breath.    Cardiovascular: Negative for chest pain and palpitations.   Gastrointestinal: Negative for nausea and vomiting.   Genitourinary: Negative for dysuria and hematuria.   Musculoskeletal: Negative for joint pain and myalgias.   Skin: Negative for itching and rash.   Neurological: Negative for weakness and headaches.       Medication List with Changes/Refills   Current Medications    AMLODIPINE (NORVASC) 10 MG TABLET    TAKE 1 TABLET EVERY DAY    ASPIRIN (ECOTRIN) 81 MG EC TABLET    Take 81 mg by mouth once daily.    ATORVASTATIN (LIPITOR) 40 MG TABLET    Take 1 tablet (40 mg total) by mouth nightly.    EMPAGLIFLOZIN  "(JARDIANCE) 10 MG TABLET    Take 1 tablet (10 mg total) by mouth once daily. For diabetes    FENOFIBRATE MICRONIZED (LOFIBRA) 134 MG CAP    TAKE 1 CAPSULE EVERY DAY  WITH  BREAKFAST    GLIPIZIDE (GLUCOTROL) 2.5 MG TR24    TAKE 1 TABLET (2.5 MG TOTAL) BY MOUTH ONCE DAILY.    HYDROCHLOROTHIAZIDE (HYDRODIURIL) 50 MG TABLET    Take 1 tablet (50 mg total) by mouth once daily.    LOSARTAN (COZAAR) 100 MG TABLET    Take 1 tablet (100 mg total) by mouth once daily.    METFORMIN (GLUCOPHAGE) 500 MG TABLET    Take 1 tablet (500 mg total) by mouth daily with breakfast.    METOPROLOL TARTRATE (LOPRESSOR) 25 MG TABLET    Take 1 tablet (25 mg total) by mouth 2 (two) times daily.    OMEPRAZOLE (PRILOSEC) 20 MG CAPSULE    Take 20 mg by mouth every morning.     VITAMIN D 1000 UNITS TAB    Take 1,000 Units by mouth once daily.       Patient Active Problem List   Diagnosis    Hyperlipidemia associated with type 2 diabetes mellitus    Nontoxic multinodular goiter    Aortic stenosis    S/P AVR (aortic valve replacement)    Hypertension associated with diabetes    Left ventricular diastolic dysfunction with preserved systolic function    Aortic atherosclerosis    Osteopenia    Gastroesophageal reflux disease without esophagitis    Bilateral carotid artery disease    CKD stage 3 secondary to diabetes    Type 2 diabetes mellitus with microalbuminuria, without long-term current use of insulin    Obesity, diabetes, and hypertension syndrome    Overweight (BMI 25.0-29.9)    DM type 2 without retinopathy    Type 2 diabetes mellitus with circulatory disorder    Sensorineural hearing loss (SNHL), bilateral           Objective:      BP (!) 148/58   Pulse 65   Temp 97.7 °F (36.5 °C) (Temporal)   Resp 16   Ht 5' 6" (1.676 m)   Wt 79.8 kg (175 lb 14.8 oz)   SpO2 97%   BMI 28.40 kg/m²   Estimated body mass index is 29.05 kg/m² as calculated from the following:    Height as of 8/5/21: 5' 6" (1.676 m).    Weight as of 8/5/21: " 81.6 kg (180 lb).  Physical Exam  Vitals reviewed.   HENT:      Head: Normocephalic and atraumatic.      Mouth/Throat:      Mouth: Mucous membranes are moist.      Pharynx: Oropharynx is clear.   Eyes:      Pupils: Pupils are equal, round, and reactive to light.   Cardiovascular:      Rate and Rhythm: Normal rate and regular rhythm.      Pulses: Normal pulses.      Heart sounds: Normal heart sounds.   Pulmonary:      Effort: Pulmonary effort is normal. No respiratory distress.      Breath sounds: Normal breath sounds. No rales.   Abdominal:      General: Bowel sounds are normal.      Palpations: Abdomen is soft.      Tenderness: There is no abdominal tenderness. There is no guarding.   Musculoskeletal:         General: No swelling or tenderness. Normal range of motion.      Cervical back: Normal range of motion and neck supple. No rigidity.   Skin:     General: Skin is warm and dry.   Neurological:      General: No focal deficit present.      Mental Status: She is alert and oriented to person, place, and time.   Psychiatric:         Mood and Affect: Mood normal.         Thought Content: Thought content normal.           Protective Sensation (w/ 10 gram monofilament):  Right: Intact throughout distal plantar testing areas, but mildly decreased on heel  Left: Intact throughout distal plantar testing areas, but mildly decreased on heel    Visual Inspection:  Normal -  Bilateral, Nails Intact - Bilateral, and Dry Skin -  Bilateral    Pedal Pulses:   Right: Present  Left: Present    Posterior tibialis:   Right:Present  Left: Present    Assessment and Plan:     Annual physical exam  Closed care gaps- diabetic management per endocrine. Diabetic physical exam completed. Eye exam scheduled.  Immunizations up to date  Labs ordered for chronic medical conditions as noted below  Meds refilled as below    Type 2 diabetes mellitus with microalbuminuria, without long-term current use of insulin  Management per endocrinology.  Paperwork was requested by patient to be faxed over, but is delayed by a few months (see media tab, prior endocrine visit faxed in December from July/August). A1c in February 7.5% per patient. Changed from Metformin-Jardiance-glipizide to Synjardy and began 3 days ago. No A1c needed today.  -     Comprehensive Metabolic Panel; Future; Expected date: 03/07/2022  -     URINALYSIS; Future; Expected date: 03/07/2022  -     MICROALBUMIN / CREATININE RATIO URINE; Future; Expected date: 03/07/2022    CKD stage 3 secondary to diabetes  -     Comprehensive Metabolic Panel; Future; Expected date: 03/07/2022  -     URINALYSIS; Future; Expected date: 03/07/2022  -     MICROALBUMIN / CREATININE RATIO URINE; Future; Expected date: 03/07/2022    Hypertension associated with diabetes  -     Comprehensive Metabolic Panel; Future; Expected date: 03/07/2022  -     TSH; Future; Expected date: 03/07/2022  -     CBC Auto Differential; Future; Expected date: 03/07/2022  -     Hypertension Digital Medicine ([facility]) Enrollment Order  -     Hypertension Digital Medicine ([facility]): Assign Onboarding Questionnaires  -     losartan (COZAAR) 100 MG tablet; Take 1 tablet (100 mg total) by mouth once daily.  Dispense: 90 tablet; Refill: 3\  -     hydroCHLOROthiazide (HYDRODIURIL) 50 MG tablet; Take 1 tablet (50 mg total) by mouth once daily.  Dispense: 90 tablet; Refill: 3  -     fenofibrate micronized (LOFIBRA) 134 MG Cap; Take 1 capsule (134 mg total) by mouth daily with breakfast.  Dispense: 90 capsule; Refill: 3    Hyperlipidemia associated with type 2 diabetes mellitus  Bilateral carotid artery occlusion  Aortic atherosclerosis  Stable, lipid panel and will adjust statin as needed.  -     Lipid Panel; Future; Expected date: 03/07/2022       Follow Up:   Follow up in about 6 months (around 9/7/2022).     Connor Gillies, DO  PGY-2, Internal Medicine  [facility]  [address] [place] [address]

## 2022-03-07 NOTE — LETTER
AUTHORIZATION FOR RELEASE OF   CONFIDENTIAL INFORMATION        We are seeing Zaida Liu, date of birth 1940, in the clinic at [facility] INTERNAL MEDICINE. Jazmin Wilson MD is the patient's PCP. Zaida Liu has an outstanding lab/procedure at the time we reviewed her chart. In order to help keep her health information updated, she has authorized us to request the following medical record(s):        (  )  MAMMOGRAM                                      (  )  COLONOSCOPY      (  )  PAP SMEAR                                          (  )  OUTSIDE LAB RESULTS     (  )  DEXA SCAN                                          ( x )  EYE EXAM            (  )  FOOT EXAM                                          (  )  ENTIRE RECORD     (  )  OUTSIDE IMMUNIZATIONS                 (  )  _______________         Please fax records to [facility], Joellen Plunkett-Kasparek, MD, [phone]     If you have any questions, please contact Yaquelin at [phone]          Patient Name: Zaida Liu  : 1940  Patient Phone #: [phone]

## 2022-03-08 ENCOUNTER — TELEPHONE (OUTPATIENT)
Dept: INTERNAL MEDICINE | Facility: CLINIC | Age: 82
End: 2022-03-08
Payer: MEDICARE

## 2022-03-08 NOTE — TELEPHONE ENCOUNTER
Spoke with the patient about her labwork. No changes for now. She will discuss with her cardiologist about further statin modification given risk factors and her age. She will see her cardiologist this Spring.

## 2022-03-09 ENCOUNTER — TELEPHONE (OUTPATIENT)
Dept: INTERVENTIONAL RADIOLOGY/VASCULAR | Facility: HOSPITAL | Age: 82
End: 2022-03-09

## 2022-03-09 NOTE — TELEPHONE ENCOUNTER
Attempted to call patient to schedule IR procedure. Unable to reach patient, a voicemail was left with our contact information ([phone]).

## 2022-03-11 ENCOUNTER — TELEPHONE (OUTPATIENT)
Dept: INTERNAL MEDICINE | Facility: CLINIC | Age: 82
End: 2022-03-11
Payer: MEDICARE

## 2022-03-11 NOTE — TELEPHONE ENCOUNTER
----- Message from Karina Wilson MD sent at 3/10/2022  3:16 PM CST -----  Your urine has resulted and the protein leakage has improved.  The extra glucose in your urine is to be expected as the empagliflozin causes the increased secretion.  Please do not hesitate to call/email if you have any questions or concerns   Karina Cabrales

## 2022-03-11 NOTE — TELEPHONE ENCOUNTER
----- Message from Karina Wilson MD sent at 3/10/2022  3:21 PM CST -----  Your lab has resulted and your thyroid level is in the normal range.  Your chemistries are stable, kidney function improved slightly on the last check but has drifted back down.  Please remember to keep well hydrated, water is your best option and to avoid all nonsteroidal anti-inflammatories like Motrin Advil and Aleve.  Remember Tylenol or acetaminophen is an acceptable analgesic if needed.   Your total cholesterol is up to 231, but your good cholesterol has also increased to 54 and your bad has decreased slightly.  Your triglycerides have also increased, avoiding sugars and simple carbohydrates will help keep the triglycerides under control along with your fenofibrate.  Your CBC was normal, you are not anemic and your cells that fight infection were in the normal range.  Please do not hesitate to call/email if you have any questions or concerns   Karina Cabrales

## 2022-03-18 ENCOUNTER — TELEPHONE (OUTPATIENT)
Dept: INTERNAL MEDICINE | Facility: CLINIC | Age: 82
End: 2022-03-18
Payer: MEDICARE

## 2022-03-18 NOTE — TELEPHONE ENCOUNTER
Spoke to pt to f/u on kayli readings at home. Pt checked her bp while on the phone. bp at 137/53, 67 P.  Pt advised to monitor her bp at home, until she gets enrolled with Digital HTN.

## 2022-03-22 ENCOUNTER — PATIENT MESSAGE (OUTPATIENT)
Dept: ADMINISTRATIVE | Facility: OTHER | Age: 82
End: 2022-03-22
Payer: MEDICARE

## 2022-03-23 ENCOUNTER — HOSPITAL ENCOUNTER (OUTPATIENT)
Dept: INTERVENTIONAL RADIOLOGY/VASCULAR | Facility: HOSPITAL | Age: 82
Discharge: HOME | End: 2022-03-23
Attending: INTERNAL MEDICINE
Payer: MEDICARE

## 2022-03-23 VITALS — SYSTOLIC BLOOD PRESSURE: 216 MMHG | DIASTOLIC BLOOD PRESSURE: 86 MMHG

## 2022-03-23 DIAGNOSIS — E04.2 NON-TOXIC MULTINODULAR GOITER: ICD-10-CM

## 2022-03-23 PROCEDURE — 10005 FNA BX W/US GDN 1ST LES: CPT

## 2022-03-23 PROCEDURE — 88177 CYTP FNA EVAL EA ADDL: CPT | Performed by: PATHOLOGY

## 2022-03-23 PROCEDURE — 10005 FNA BX W/US GDN 1ST LES: CPT | Performed by: RADIOLOGY

## 2022-03-23 PROCEDURE — 88172 CYTP DX EVAL FNA 1ST EA SITE: CPT | Mod: 26,,, | Performed by: PATHOLOGY

## 2022-03-23 PROCEDURE — 88172 CYTP DX EVAL FNA 1ST EA SITE: CPT | Performed by: PATHOLOGY

## 2022-03-23 PROCEDURE — 88173 CYTOPATH EVAL FNA REPORT: CPT | Performed by: PATHOLOGY

## 2022-03-23 PROCEDURE — 88173 CYTOPATH EVAL FNA REPORT: CPT | Mod: 26,,, | Performed by: PATHOLOGY

## 2022-03-23 PROCEDURE — 10005 FNA BX W/US GDN 1ST LES: CPT | Mod: ,,, | Performed by: RADIOLOGY

## 2022-03-23 PROCEDURE — 88172 PR  EVALUATION OF FNA SMEAR TO DETERMINE ADEQUACY, FIRST EVAL: ICD-10-PCS | Mod: 26,,, | Performed by: PATHOLOGY

## 2022-03-23 PROCEDURE — 88173 PR  INTERPRETATION OF FNA SMEAR: ICD-10-PCS | Mod: 26,,, | Performed by: PATHOLOGY

## 2022-03-23 PROCEDURE — 10005 IR US FINE NEEDLE ASPIRATION BIOPSY, FIRST LESION: ICD-10-PCS | Mod: ,,, | Performed by: RADIOLOGY

## 2022-03-23 NOTE — SEDATION DOCUMENTATION
Patient states she is seeing a cardiologist regarding her BP and will be getting a cuff from the [facility] and a 'gadget' from them that will help to record her BP 'every day'

## 2022-03-23 NOTE — H&P
[facility] - Interventional Radiology  History & Physical - Short Stay  Interventional Radiology    SUBJECTIVE:     Chief Complaint/Reason for Admission: Thyroid nodule    Informant(s):  self and Electronic Health Record    History of Present Illness:  Zaida Liu is a 82 y.o. female with a history of thyroid nodule.    Patient presents for U/S guided thyroid nodule FNA.    Scheduled Meds:   Continuous Infusions:   PRN Meds:     Review of patient's allergies indicates:   Allergen Reactions    Codeine Other (See Comments)       Past Medical History:   Diagnosis Date    Aortic stenosis     moderate to severe    Disorder of kidney and ureter     DVT (deep venous thrombosis)     postoperatively    Hemorrhoids     History of DVT of lower extremity     s/p surgery    Obesity, diabetes, and hypertension syndrome     Positive PPD, treated     PPD positive, treated     hosp x 1 yr, treated x 5 yrs- child    Sciatica     Stenosis of aortic and mitral valves     Thyroid disease     Type 2 diabetes mellitus with circulatory disorder 10/14/2016    Urinary incontinence      Past Surgical History:   Procedure Laterality Date    AORTIC VALVE REPLACEMENT  8/25/2015    Aortic valve replacement with a 21-mm St. Jameel pericardial valve tissue via upper dominguez-sternotomy.    BREAST SURGERY      breast reduction    CHOLECYSTECTOMY      COLONOSCOPY N/A 9/21/2020    Procedure: COLONOSCOPY;  Surgeon: Maik Arango MD;  Location: [facility] ENDO;  Service: Endoscopy;  Laterality: N/A;    TONSILLECTOMY      TOTAL REDUCTION MAMMOPLASTY      TUBAL LIGATION       Family History   Problem Relation Age of Onset    Breast cancer Cousin     Cancer Cousin         breast    Breast cancer Other     Cancer Other         breast    Colon cancer Other     Heart disease Mother         CHF    Heart failure Mother         d. 85    Hypertension Mother     Heart disease Father         d. 61    Alcohol abuse Father     Stroke Father      Hypertension Father     Lymphoma Sister         on immunosuppresant    Rheum arthritis Sister         d.80    Cancer Sister         lymphoma    Heart disease Sister     Stroke Sister         mild    Other Sister         frequent urination    Heart disease Brother         d. 82    Colon cancer Sister         d. 81    Heart disease Brother         d. 60    Diabetes Brother     No Known Problems Daughter         lives in [facility], 2nd m pending    No Known Problems Son         lives in [place], 15 yo son, 20 yo dtr- 2019    Hypertension Son         lives in [facility], 15 yo old- 2019    Heart attack Neg Hx     Hyperlipidemia Neg Hx      Social History     Tobacco Use    Smoking status: Former Smoker     Quit date: 10/14/1966     Years since quittin.4    Smokeless tobacco: Never Used   Substance Use Topics    Alcohol use: Yes     Alcohol/week: 2.0 standard drinks     Types: 2 Glasses of wine per week     Comment: 1-2 glasses on wine on weekend    Drug use: No        Review of Systems:  ROS not obtained    OBJECTIVE:     Vital Signs (Most Recent):  BP: (!) 216/86 (22 1401)    Physical Exam:  alert and oriented.    Laboratory  CBC:   Lab Results   Component Value Date/Time    WBC 9.56 2022 03:02 PM    RBC 4.55 2022 03:02 PM    HGB 13.6 2022 03:02 PM    HCT 41.2 2022 03:02 PM    HCT 28 (L) 2015 12:57 PM     2022 03:02 PM    MCV 91 2022 03:02 PM    MCH 29.9 2022 03:02 PM    MCHC 33.0 2022 03:02 PM     Coagulation:   Lab Results   Component Value Date/Time    INR 1.0 12/15/2015 05:01 AM    APTT 23.5 2015 03:25 AM         ASSESSMENT/PLAN:     Thyroid nodule isthmus.    Patient will undergo U/S guided FNA thyroid nodule.    Sedation Plan: Lidocaine local

## 2022-03-23 NOTE — PROCEDURES
[facility] Interventional Radiology  Interventional Radiology  High Risk Procedure - Outpatient    Date: 03/23/2022 Time: 2:03 PM    Pre-Op Diagnosis: Thyroid nodule    Post-Op Diagnosis: same    Procedure Performed by: Larry Craft MD    Assistant: none    Procedure: U/S guided thyroid FNA    Specimen/Tissue Removed: 6 x 25 gauge FNA passes    Estimated Blood Loss: Less than 5 mL    Procedure Note/Findings: U/S guided FNA performed. No immediate post-procedure complications noted.            Please refer to dictated report for additional details.

## 2022-03-24 ENCOUNTER — OFFICE VISIT (OUTPATIENT)
Dept: FAMILY MEDICINE | Facility: CLINIC | Age: 82
End: 2022-03-24
Payer: MEDICARE

## 2022-03-24 VITALS
BODY MASS INDEX: 28.79 KG/M2 | TEMPERATURE: 97 F | HEIGHT: 66 IN | SYSTOLIC BLOOD PRESSURE: 142 MMHG | HEART RATE: 65 BPM | DIASTOLIC BLOOD PRESSURE: 60 MMHG | WEIGHT: 179.13 LBS | OXYGEN SATURATION: 98 %

## 2022-03-24 DIAGNOSIS — I77.9 BILATERAL CAROTID ARTERY DISEASE, UNSPECIFIED TYPE: ICD-10-CM

## 2022-03-24 DIAGNOSIS — E11.22 CKD STAGE 3 SECONDARY TO DIABETES: ICD-10-CM

## 2022-03-24 DIAGNOSIS — E11.29 TYPE 2 DIABETES MELLITUS WITH MICROALBUMINURIA, WITHOUT LONG-TERM CURRENT USE OF INSULIN: ICD-10-CM

## 2022-03-24 DIAGNOSIS — E11.59 OBESITY, DIABETES, AND HYPERTENSION SYNDROME: ICD-10-CM

## 2022-03-24 DIAGNOSIS — E04.2 NONTOXIC MULTINODULAR GOITER: ICD-10-CM

## 2022-03-24 DIAGNOSIS — E11.59 HYPERTENSION ASSOCIATED WITH DIABETES: ICD-10-CM

## 2022-03-24 DIAGNOSIS — Z00.00 ENCOUNTER FOR PREVENTIVE HEALTH EXAMINATION: Primary | ICD-10-CM

## 2022-03-24 DIAGNOSIS — E11.69 OBESITY, DIABETES, AND HYPERTENSION SYNDROME: ICD-10-CM

## 2022-03-24 DIAGNOSIS — I70.0 AORTIC ATHEROSCLEROSIS: ICD-10-CM

## 2022-03-24 DIAGNOSIS — Z95.2 S/P AVR (AORTIC VALVE REPLACEMENT): ICD-10-CM

## 2022-03-24 DIAGNOSIS — E78.5 HYPERLIPIDEMIA ASSOCIATED WITH TYPE 2 DIABETES MELLITUS: ICD-10-CM

## 2022-03-24 DIAGNOSIS — I35.0 AORTIC VALVE STENOSIS, ETIOLOGY OF CARDIAC VALVE DISEASE UNSPECIFIED: ICD-10-CM

## 2022-03-24 DIAGNOSIS — E11.69 HYPERLIPIDEMIA ASSOCIATED WITH TYPE 2 DIABETES MELLITUS: ICD-10-CM

## 2022-03-24 DIAGNOSIS — K21.9 GASTROESOPHAGEAL REFLUX DISEASE WITHOUT ESOPHAGITIS: ICD-10-CM

## 2022-03-24 DIAGNOSIS — I15.2 HYPERTENSION ASSOCIATED WITH DIABETES: ICD-10-CM

## 2022-03-24 DIAGNOSIS — I51.89 LEFT VENTRICULAR DIASTOLIC DYSFUNCTION WITH PRESERVED SYSTOLIC FUNCTION: ICD-10-CM

## 2022-03-24 DIAGNOSIS — I15.2 OBESITY, DIABETES, AND HYPERTENSION SYNDROME: ICD-10-CM

## 2022-03-24 DIAGNOSIS — N18.30 CKD STAGE 3 SECONDARY TO DIABETES: ICD-10-CM

## 2022-03-24 DIAGNOSIS — R80.9 TYPE 2 DIABETES MELLITUS WITH MICROALBUMINURIA, WITHOUT LONG-TERM CURRENT USE OF INSULIN: ICD-10-CM

## 2022-03-24 DIAGNOSIS — M85.80 OSTEOPENIA, UNSPECIFIED LOCATION: ICD-10-CM

## 2022-03-24 DIAGNOSIS — E11.9 DM TYPE 2 WITHOUT RETINOPATHY: ICD-10-CM

## 2022-03-24 DIAGNOSIS — E11.59 TYPE 2 DIABETES MELLITUS WITH OTHER CIRCULATORY COMPLICATION, WITHOUT LONG-TERM CURRENT USE OF INSULIN: ICD-10-CM

## 2022-03-24 DIAGNOSIS — E66.9 OBESITY, DIABETES, AND HYPERTENSION SYNDROME: ICD-10-CM

## 2022-03-24 PROCEDURE — 1170F FXNL STATUS ASSESSED: CPT | Mod: CPTII,S$GLB,, | Performed by: NURSE PRACTITIONER

## 2022-03-24 PROCEDURE — G0439 PPPS, SUBSEQ VISIT: HCPCS | Mod: S$GLB,,, | Performed by: NURSE PRACTITIONER

## 2022-03-24 PROCEDURE — 3077F SYST BP >= 140 MM HG: CPT | Mod: CPTII,S$GLB,, | Performed by: NURSE PRACTITIONER

## 2022-03-24 PROCEDURE — 1126F PR PAIN SEVERITY QUANTIFIED, NO PAIN PRESENT: ICD-10-PCS | Mod: CPTII,S$GLB,, | Performed by: NURSE PRACTITIONER

## 2022-03-24 PROCEDURE — 1170F PR FUNCTIONAL STATUS ASSESSED: ICD-10-PCS | Mod: CPTII,S$GLB,, | Performed by: NURSE PRACTITIONER

## 2022-03-24 PROCEDURE — G0439 PR MEDICARE ANNUAL WELLNESS SUBSEQUENT VISIT: ICD-10-PCS | Mod: S$GLB,,, | Performed by: NURSE PRACTITIONER

## 2022-03-24 PROCEDURE — 1101F PR PT FALLS ASSESS DOC 0-1 FALLS W/OUT INJ PAST YR: ICD-10-PCS | Mod: CPTII,S$GLB,, | Performed by: NURSE PRACTITIONER

## 2022-03-24 PROCEDURE — 99999 PR PBB SHADOW E&M-EST. PATIENT-LVL IV: ICD-10-PCS | Mod: PBBFAC,,, | Performed by: NURSE PRACTITIONER

## 2022-03-24 PROCEDURE — 3077F PR MOST RECENT SYSTOLIC BLOOD PRESSURE >= 140 MM HG: ICD-10-PCS | Mod: CPTII,S$GLB,, | Performed by: NURSE PRACTITIONER

## 2022-03-24 PROCEDURE — 1160F PR REVIEW ALL MEDS BY PRESCRIBER/CLIN PHARMACIST DOCUMENTED: ICD-10-PCS | Mod: CPTII,S$GLB,, | Performed by: NURSE PRACTITIONER

## 2022-03-24 PROCEDURE — 1126F AMNT PAIN NOTED NONE PRSNT: CPT | Mod: CPTII,S$GLB,, | Performed by: NURSE PRACTITIONER

## 2022-03-24 PROCEDURE — 1159F MED LIST DOCD IN RCRD: CPT | Mod: CPTII,S$GLB,, | Performed by: NURSE PRACTITIONER

## 2022-03-24 PROCEDURE — 99999 PR PBB SHADOW E&M-EST. PATIENT-LVL IV: CPT | Mod: PBBFAC,,, | Performed by: NURSE PRACTITIONER

## 2022-03-24 PROCEDURE — 1101F PT FALLS ASSESS-DOCD LE1/YR: CPT | Mod: CPTII,S$GLB,, | Performed by: NURSE PRACTITIONER

## 2022-03-24 PROCEDURE — 3051F PR MOST RECENT HEMOGLOBIN A1C LEVEL 7.0 - < 8.0%: ICD-10-PCS | Mod: CPTII,S$GLB,, | Performed by: NURSE PRACTITIONER

## 2022-03-24 PROCEDURE — 1160F RVW MEDS BY RX/DR IN RCRD: CPT | Mod: CPTII,S$GLB,, | Performed by: NURSE PRACTITIONER

## 2022-03-24 PROCEDURE — 1159F PR MEDICATION LIST DOCUMENTED IN MEDICAL RECORD: ICD-10-PCS | Mod: CPTII,S$GLB,, | Performed by: NURSE PRACTITIONER

## 2022-03-24 PROCEDURE — 3288F PR FALLS RISK ASSESSMENT DOCUMENTED: ICD-10-PCS | Mod: CPTII,S$GLB,, | Performed by: NURSE PRACTITIONER

## 2022-03-24 PROCEDURE — 3078F PR MOST RECENT DIASTOLIC BLOOD PRESSURE < 80 MM HG: ICD-10-PCS | Mod: CPTII,S$GLB,, | Performed by: NURSE PRACTITIONER

## 2022-03-24 PROCEDURE — 3051F HG A1C>EQUAL 7.0%<8.0%: CPT | Mod: CPTII,S$GLB,, | Performed by: NURSE PRACTITIONER

## 2022-03-24 PROCEDURE — 3078F DIAST BP <80 MM HG: CPT | Mod: CPTII,S$GLB,, | Performed by: NURSE PRACTITIONER

## 2022-03-24 PROCEDURE — 3288F FALL RISK ASSESSMENT DOCD: CPT | Mod: CPTII,S$GLB,, | Performed by: NURSE PRACTITIONER

## 2022-03-24 NOTE — PATIENT INSTRUCTIONS
Counseling and Referral of Other Preventative  (Italic type indicates deductible and co-insurance are waived)    Patient Name: Zaida Liu  Today's Date: 3/24/2022    Health Maintenance       Date Due Completion Date    Eye Exam 02/23/2022 2/23/2021    Override on 10/26/2015: Done (12/2014 Dr. Lydia Ragland, 12/2015 o/v pending)    Shingles Vaccine (3 of 3) 03/29/2022 2/1/2022    Hemoglobin A1c 08/21/2022 2/21/2022    Override on 12/18/2018: Done    Aspirin/Antiplatelet Therapy 03/07/2023 3/7/2022    Diabetes Urine Screening 03/07/2023 3/7/2022    Foot Exam 03/07/2023 3/7/2022    Override on 7/20/2021: Done    Override on 6/29/2020: Done    Override on 12/18/2018: Done    Override on 10/2/2017: Done    Override on 10/4/2016: Done    Override on 10/26/2015: Done    Lipid Panel 03/07/2023 3/7/2022    Colonoscopy 09/21/2023 9/21/2020    TETANUS VACCINE 02/18/2024 2/18/2014    DEXA Scan 09/14/2024 9/14/2021        No orders of the defined types were placed in this encounter.    The following information is provided to all patients.  This information is to help you find resources for any of the problems found today that may be affecting your health:                Living healthy guide: www.[facility].gov      Understanding Diabetes: www.diabetes.org      Eating healthy: www.cdc.gov/healthyweight      CDC home safety checklist: www.cdc.gov/steadi/patient.html      Agency on Aging: www.goea.[place].[facility]      Alcoholics anonymous (AA): www.aa.org      Physical Activity: www.juan.nih.gov/zt5zvky      Tobacco use: www.quitwithusla.org

## 2022-03-24 NOTE — PROGRESS NOTES
"  Zaida Liu presented for a  Medicare AWV and comprehensive Health Risk Assessment today. The following components were reviewed and updated:    · Medical history  · Family History  · Social history  · Allergies and Current Medications  · Health Risk Assessment  · Health Maintenance  · Care Team       ** See Completed Assessments for Annual Wellness Visit within the encounter summary.**       The following assessments were completed:  · Living Situation  · CAGE  · Depression Screening  · Timed Get Up and Go  · Whisper Test  · Cognitive Function Screening  · Nutrition Screening  · ADL Screening  · PAQ Screening        Vitals:    03/24/22 0956   BP: (!) 142/60   Pulse: 65   Temp: 97 °F (36.1 °C)   TempSrc: Oral   SpO2: 98%   Weight: 81.3 kg (179 lb 2 oz)   Height: 5' 6" (1.676 m)     Body mass index is 28.91 kg/m².  Physical Exam  Vitals and nursing note reviewed.   Constitutional:       Appearance: Normal appearance.   Cardiovascular:      Rate and Rhythm: Normal rate.      Pulses: Normal pulses.      Heart sounds: Normal heart sounds.   Pulmonary:      Effort: Pulmonary effort is normal.      Breath sounds: Normal breath sounds.   Abdominal:      General: Bowel sounds are normal.      Palpations: Abdomen is soft.   Musculoskeletal:         General: Normal range of motion.   Skin:     General: Skin is warm and dry.   Neurological:      Mental Status: She is alert and oriented to person, place, and time.   Psychiatric:         Mood and Affect: Mood normal.         Behavior: Behavior normal.             Diagnoses and health risks identified today and associated recommendations/orders:    1. Encounter for preventive health examination  Pt was seen today for an Annual Wellness visit. Healthcare maintenance and screening recommendations were discussed and updated as indicated. Return in one year for AWV.    Review current opioid prescriptions: n/a  Screen for potential Substance Use Disorders: n/a    2. Hypertension " associated with diabetes  Last noted Hgb A1C 7.5 on 3/7/22. Pt reports that her fasting cbg ranges 127 - 131. Discussed Hgb A1c and fasting cbg goals. The current medical regimen is effective;  continue present plan and medications. Followed by Endocrine.    3. Hyperlipidemia associated with type 2 diabetes mellitus  See # 2. The current medical regimen is effective;  continue present plan and medications.    4. Type 2 diabetes mellitus with other circulatory complication, without long-term current use of insulin  See # 2.The current medical regimen is effective;  continue present plan and medications.    5. Type 2 diabetes mellitus with microalbuminuria, without long-term current use of insulin  See # 2. The current medical regimen is effective;  continue present plan and medications.    6. CKD stage 3 secondary to diabetes  See # 2. The current medical regimen is effective;  continue present plan and medications.    7. Obesity, diabetes, and hypertension syndrome  See # 2. The current medical regimen is effective;  continue present plan and medications.    8. DM type 2 without retinopathy  See # 2. The current medical regimen is effective;  continue present plan and medications.    9. Aortic atherosclerosis  Chronic. Chest CT 12/01/15. The current medical regimen is effective;  continue present plan and medications.    10. Bilateral carotid artery disease, unspecified type  The current medical regimen is effective;  continue present plan and medications.    11. Left ventricular diastolic dysfunction with preserved systolic function  The current medical regimen is effective;  continue present plan and medications.    12. Aortic valve stenosis, etiology of cardiac valve disease unspecified  The current medical regimen is effective;  continue present plan and medications.    13. S/P AVR (aortic valve replacement)  The current medical regimen is effective;  continue present plan and medications.    14. Nontoxic  multinodular goiter  The current medical regimen is effective;  continue present plan and medications.    15. Gastroesophageal reflux disease without esophagitis  The current medical regimen is effective;  continue present plan and medications.    16. Osteopenia, unspecified location  The current medical regimen is effective;  continue present plan and medications.        Provided Anise with a 5-10 year written screening schedule and personal prevention plan. Recommendations were developed using the USPSTF age appropriate recommendations. Education, counseling, and referrals were provided as needed. After Visit Summary printed and given to patient which includes a list of additional screenings\tests needed.    Follow up in about 5 months (around 9/7/2022) with provider.    LESLEY Pinedo  I offered to discuss advanced care planning, including how to pick a person who would make decisions for you if you were unable to make them for yourself, called a health care power of , and what kind of decisions you might make such as use of life sustaining treatments such as ventilators and tube feeding when faced with a life limiting illness recorded on a living will that they will need to know. (How you want to be cared for as you near the end of your natural life)     X Patient is interested in learning more about how to make advanced directives.  I provided them paperwork and offered to discuss this with them.

## 2022-03-29 LAB
FINAL PATHOLOGIC DIAGNOSIS: NORMAL
Lab: NORMAL

## 2022-04-07 ENCOUNTER — TELEPHONE (OUTPATIENT)
Dept: INTERNAL MEDICINE | Facility: CLINIC | Age: 82
End: 2022-04-07
Payer: MEDICARE

## 2022-05-03 LAB
LEFT EYE DM RETINOPATHY: NEGATIVE
RIGHT EYE DM RETINOPATHY: NEGATIVE

## 2022-05-18 ENCOUNTER — TELEPHONE (OUTPATIENT)
Dept: INTERNAL MEDICINE | Facility: CLINIC | Age: 82
End: 2022-05-18
Payer: MEDICARE

## 2022-05-18 NOTE — TELEPHONE ENCOUNTER
Spoke to pt and son, Alphonso. Pt scheduled for a hospital d/c f/u on Tuesday, 5/24/22 at 3:30 PM with [facility] and [facility].

## 2022-05-18 NOTE — TELEPHONE ENCOUNTER
FW: Inpatient Discharge on 5/17/2022  Received: Today  MD Promise Croft LPN  Pt will need TCC             Previous Messages       ----- Message -----   From: Joi Núñez ([facility])   Sent: 5/17/2022   5:46 PM CDT   To: Karina Wilson MD ([facility])   Subject: Inpatient Discharge on 5/17/2022

## 2022-05-20 ENCOUNTER — TELEPHONE (OUTPATIENT)
Dept: INTERNAL MEDICINE | Facility: CLINIC | Age: 82
End: 2022-05-20
Payer: MEDICARE

## 2022-05-20 RX ORDER — METFORMIN HYDROCHLORIDE 500 MG/1
500 TABLET ORAL 2 TIMES DAILY WITH MEALS
Qty: 180 TABLET | Refills: 3 | Status: SHIPPED | OUTPATIENT
Start: 2022-05-20 | End: 2023-02-07 | Stop reason: SDUPTHER

## 2022-05-20 RX ORDER — LANOLIN ALCOHOL/MO/W.PET/CERES
1000 CREAM (GRAM) TOPICAL DAILY
COMMUNITY
Start: 2022-05-18 | End: 2022-06-17

## 2022-05-20 RX ORDER — CLOPIDOGREL BISULFATE 75 MG/1
75 TABLET ORAL DAILY
COMMUNITY
Start: 2022-05-18 | End: 2022-06-16 | Stop reason: SDUPTHER

## 2022-05-20 NOTE — TELEPHONE ENCOUNTER
----- Message from Rosa Carrington sent at 5/20/2022 12:30 PM CDT -----  Contact: [phone]  Pt's son called to advise that he missed a call. Phone did not ring if pt can be seen please leave a voicemail. Please Advise

## 2022-05-20 NOTE — TELEPHONE ENCOUNTER
Spoke to pt's son, Alphonso. He stated that he checks on the pt and her spouse daily. He stated that the pt's spouse stated that the pt had a bad night. He stated that she was up and down with diarrhea all night. He stated that the pt was that since the stroke, the pt has been talking slow, and cannot recall some things. He stated that she is still behaving the same way. He stated that he has been slowly advancing her diet today. She is eating.   I advised that he encourage her to drink plenty of water and gatorade/powerade/pediayte.   He stated that the pt's BP around 11a today was 134/63, 66P.  I inquired as to the pt's BS.   He stated that he has not checked it. He stated that the pt cannot recall where the glucometer is. He stated that he will try to find it, and check it.  He stated that when the pt was d/c, some of her medications were changed. Not sure if that is causing her diarrhea. Pt was switched from jardiance to synjardy XR one daily/  He is not sure if he should hold any medication.  Please advise.

## 2022-05-20 NOTE — TELEPHONE ENCOUNTER
----- Message from Sylvia Palma sent at 5/20/2022 12:10 PM CDT -----  Contact: naya/ son [phone]  Patients so states mother had a stroke 5/14/22. Son states mother did not have a good night and would like to bring her in today for an office visit. Please call and advise.    Thank you and have a great day.

## 2022-05-20 NOTE — TELEPHONE ENCOUNTER
rec to continue the metformin w/ a level of 174    Will review HH needs at [facility] pending Tuesday

## 2022-05-20 NOTE — TELEPHONE ENCOUNTER
----- Message from Amy Sampson sent at 5/20/2022 12:46 PM CDT -----  Contact: Alphonso   Patient is returning a phone call.  Who left a message for the patient: Promise  Does patient know what this is regarding:    Would you like a call back, or a response through your [facility] portal?:   call back  Comments:  Please call son Alphonso at / his mother home number

## 2022-05-20 NOTE — TELEPHONE ENCOUNTER
Spoke to pt's son and per Dr. Cabrales recommendation is to continue Metformin HCL and d/c Synjardy due to diarrhea.      Pt advised to keep mother hydrated and keep upcoming appt on Tues pending if need to go to ED. Pt agreed. He will start Metformin tomorrow 5/21/22.

## 2022-05-20 NOTE — TELEPHONE ENCOUNTER
synjardy side effects include diarrhea, if sx persist may need to have evaluated in ED so lab and IVF can be administered  if dehydrated  Does she have HH

## 2022-05-20 NOTE — TELEPHONE ENCOUNTER
Returned call again to pt's son, Alphonso. No answer. Left voicemail advising that there is no availability today. Trying to find out what symptoms the pt is having.

## 2022-05-20 NOTE — TELEPHONE ENCOUNTER
Spoke to Alphonso. He stated that he took the pt's BS. It is at 174.   He is not sure if she should continue the metformin or not?    He stated that the pt was not d/c with HH.    I advised on dehydration precautions, and to take to ER if it worsens pt condition.

## 2022-05-24 ENCOUNTER — OFFICE VISIT (OUTPATIENT)
Dept: INTERNAL MEDICINE | Facility: CLINIC | Age: 82
End: 2022-05-24
Payer: MEDICARE

## 2022-05-24 VITALS
RESPIRATION RATE: 18 BRPM | WEIGHT: 175.25 LBS | HEIGHT: 67 IN | HEART RATE: 72 BPM | DIASTOLIC BLOOD PRESSURE: 62 MMHG | TEMPERATURE: 97 F | OXYGEN SATURATION: 98 % | SYSTOLIC BLOOD PRESSURE: 138 MMHG | BODY MASS INDEX: 27.51 KG/M2

## 2022-05-24 DIAGNOSIS — Z09 HOSPITAL DISCHARGE FOLLOW-UP: Primary | ICD-10-CM

## 2022-05-24 DIAGNOSIS — N18.9 ACUTE KIDNEY INJURY SUPERIMPOSED ON CHRONIC KIDNEY DISEASE: ICD-10-CM

## 2022-05-24 DIAGNOSIS — R41.0 CONFUSION: ICD-10-CM

## 2022-05-24 DIAGNOSIS — I63.9 DYSARTHRIA DUE TO ACUTE STROKE: ICD-10-CM

## 2022-05-24 DIAGNOSIS — N18.32 CHRONIC KIDNEY DISEASE, STAGE 3B: ICD-10-CM

## 2022-05-24 DIAGNOSIS — R80.9 TYPE 2 DIABETES MELLITUS WITH MICROALBUMINURIA, WITHOUT LONG-TERM CURRENT USE OF INSULIN: ICD-10-CM

## 2022-05-24 DIAGNOSIS — R47.1 DYSARTHRIA DUE TO ACUTE STROKE: ICD-10-CM

## 2022-05-24 DIAGNOSIS — E53.8 B12 DEFICIENCY: ICD-10-CM

## 2022-05-24 DIAGNOSIS — I77.9 DISORDER OF ARTERIES AND ARTERIOLES: ICD-10-CM

## 2022-05-24 DIAGNOSIS — E11.22 CKD STAGE 3 SECONDARY TO DIABETES: ICD-10-CM

## 2022-05-24 DIAGNOSIS — I63.81 LEFT THALAMIC INFARCTION: ICD-10-CM

## 2022-05-24 DIAGNOSIS — I67.7 CEREBRAL ARTERITIS: ICD-10-CM

## 2022-05-24 DIAGNOSIS — N18.30 CKD STAGE 3 SECONDARY TO DIABETES: ICD-10-CM

## 2022-05-24 DIAGNOSIS — I16.0 HYPERTENSIVE URGENCY: ICD-10-CM

## 2022-05-24 DIAGNOSIS — E11.29 TYPE 2 DIABETES MELLITUS WITH MICROALBUMINURIA, WITHOUT LONG-TERM CURRENT USE OF INSULIN: ICD-10-CM

## 2022-05-24 DIAGNOSIS — N17.9 ACUTE KIDNEY INJURY SUPERIMPOSED ON CHRONIC KIDNEY DISEASE: ICD-10-CM

## 2022-05-24 DIAGNOSIS — I63.9 CEREBROVASCULAR ACCIDENT (CVA), UNSPECIFIED MECHANISM: ICD-10-CM

## 2022-05-24 PROCEDURE — 1126F AMNT PAIN NOTED NONE PRSNT: CPT | Mod: CPTII,S$GLB,, | Performed by: INTERNAL MEDICINE

## 2022-05-24 PROCEDURE — 99214 OFFICE O/P EST MOD 30 MIN: CPT | Mod: S$GLB,,, | Performed by: INTERNAL MEDICINE

## 2022-05-24 PROCEDURE — 1159F PR MEDICATION LIST DOCUMENTED IN MEDICAL RECORD: ICD-10-PCS | Mod: CPTII,S$GLB,, | Performed by: INTERNAL MEDICINE

## 2022-05-24 PROCEDURE — 99499 UNLISTED E&M SERVICE: CPT | Mod: S$GLB,,, | Performed by: INTERNAL MEDICINE

## 2022-05-24 PROCEDURE — 99999 PR PBB SHADOW E&M-EST. PATIENT-LVL IV: CPT | Mod: PBBFAC,,, | Performed by: INTERNAL MEDICINE

## 2022-05-24 PROCEDURE — 1101F PT FALLS ASSESS-DOCD LE1/YR: CPT | Mod: CPTII,S$GLB,, | Performed by: INTERNAL MEDICINE

## 2022-05-24 PROCEDURE — 99214 PR OFFICE/OUTPT VISIT, EST, LEVL IV, 30-39 MIN: ICD-10-PCS | Mod: S$GLB,,, | Performed by: INTERNAL MEDICINE

## 2022-05-24 PROCEDURE — 1101F PR PT FALLS ASSESS DOC 0-1 FALLS W/OUT INJ PAST YR: ICD-10-PCS | Mod: CPTII,S$GLB,, | Performed by: INTERNAL MEDICINE

## 2022-05-24 PROCEDURE — 3288F FALL RISK ASSESSMENT DOCD: CPT | Mod: CPTII,S$GLB,, | Performed by: INTERNAL MEDICINE

## 2022-05-24 PROCEDURE — 3288F PR FALLS RISK ASSESSMENT DOCUMENTED: ICD-10-PCS | Mod: CPTII,S$GLB,, | Performed by: INTERNAL MEDICINE

## 2022-05-24 PROCEDURE — 3078F DIAST BP <80 MM HG: CPT | Mod: CPTII,S$GLB,, | Performed by: INTERNAL MEDICINE

## 2022-05-24 PROCEDURE — 1126F PR PAIN SEVERITY QUANTIFIED, NO PAIN PRESENT: ICD-10-PCS | Mod: CPTII,S$GLB,, | Performed by: INTERNAL MEDICINE

## 2022-05-24 PROCEDURE — 3075F SYST BP GE 130 - 139MM HG: CPT | Mod: CPTII,S$GLB,, | Performed by: INTERNAL MEDICINE

## 2022-05-24 PROCEDURE — 99999 PR PBB SHADOW E&M-EST. PATIENT-LVL IV: ICD-10-PCS | Mod: PBBFAC,,, | Performed by: INTERNAL MEDICINE

## 2022-05-24 PROCEDURE — 1159F MED LIST DOCD IN RCRD: CPT | Mod: CPTII,S$GLB,, | Performed by: INTERNAL MEDICINE

## 2022-05-24 PROCEDURE — 3078F PR MOST RECENT DIASTOLIC BLOOD PRESSURE < 80 MM HG: ICD-10-PCS | Mod: CPTII,S$GLB,, | Performed by: INTERNAL MEDICINE

## 2022-05-24 PROCEDURE — 3075F PR MOST RECENT SYSTOLIC BLOOD PRESS GE 130-139MM HG: ICD-10-PCS | Mod: CPTII,S$GLB,, | Performed by: INTERNAL MEDICINE

## 2022-05-24 PROCEDURE — 99499 RISK ADDL DX/OHS AUDIT: ICD-10-PCS | Mod: S$GLB,,, | Performed by: INTERNAL MEDICINE

## 2022-05-24 RX ORDER — HYDROCHLOROTHIAZIDE 12.5 MG/1
12.5 TABLET ORAL DAILY
Qty: 90 TABLET | Refills: 3
Start: 2022-05-24 | End: 2022-06-16 | Stop reason: SDUPTHER

## 2022-05-24 NOTE — PROGRESS NOTES
CC: Hosp follow up        82 y.o. female patient presents in f/u to hospitalization       Admitted: 5/14/2022       D/C:5/17/2022    Family and/or Caretaker present at visit?  On Alphonso and dtlissette Marshall  Diagnostic tests reviewed/disposition: MRI, echo and lab  Disease/illness education: Control of BP and BP  Home health/community services discussion/referrals:  Ordered today  Establishment or re-establishment of referral orders for community resources:  n/a  Discussion with other health care providers:  n/a    82-year-old female with diabetes, hypertension, hyperlipidemia, CKD 3,  Left ventricular diastolic dysfunction with preserved systolic function and aortic stenosis status post valve replacement presents today in follow-up to hospitalization for left thalamic stroke.    Son Alphonso reported 514 he call the patient to discuss his son's graduation plans and to see if there was anyone she wanted to add to the list.  He reported that her processing was slow and she was not responding in the usual manner.  He then went over to the home to check on his mom and she was shuffling around and not answering questions and seemed to be quite confused.  His father advised him that for breakfast that morning and she was unable to use the other nor the stove and he had to help her with these normal activities but did not think to call anyone to advise of her mental status change.  The son did not recall any motor weakness.    Son reported seeing a blood pressure reading of 277/147 when she was in the emergency room.  Review of her digital hypertension revealed readings up to 162, but earlier today she was 144/61, and today at the appointment she is 138/62.  On hospital her HCTZ was changed from 50-12.5 mg however her amlodipine 10, losartan 100 mg, and metoprolol 25 b.i.d. was not changed.    MRI +, revealed a left thalamic stroke  US Carotids:   ----Impression:   1. No hemodynamically significant stenosis on either side (<50%  stenosis). There is scattered calcified plaque seen.  2. Forward flow both vertebral arteries.  Doppler color flow, and Doppler spectral analysis of the carotid system   ----Right plaque: mild  Additional comment(s): There is moderate plaque within the right carotid bulb extending into the origins of the internal and external carotid arteries.   ----Left plaque: mild  Additional comment(s): There is mild atherosclerotic plaque within the carotid bulb extending into the origin of the left external carotid artery  CXR:  ----Impression:   1. Postoperative findings.  2. Questionable infiltrate at the left lung base. If indicated, a follow-up exam with lateral may be helpful for further evaluation.  Lateral CXR:  ----Findings: There are sternal wires. The heart is upper limits of normal in size. There is no vascular congestion. Mediastinum is unremarkable.    The left hemidiaphragm is obscured. This may be due to positioning. However, the possibility of an infiltrate at the left lung base cannot be excluded.  There is no pleural effusion    Neurology Recs: Dr. 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   Normal left ventricular systolic function.   Left ventricular ejection fraction is estimated at 65%.   Normal bioprosthetic aortic valve.   A bubble study was done and appeared to be negative at rest and with  valsalva.         Since discharge:       Sx:  Patient is able to answer direct questions but cannot elaborate on them.  She responds to yes or no, yes she can dress herself, yes she can feed herself but when ask to expound on what she can do for herself she is unable to provide a more robust response.  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              Medcard:       ROS:         No fever, chills, or night sweats         No visual changes or d/c         No dysphagia or early satiety;               No cough or wheezing         No PND or orthopnea         No chest pain or palpitations         No weight change or LE edema         No GERD or abdominal pain         No change in bowel or blood in stool, patient had diarrhea and a call was received earlier in the week, she was switched off the Synjardy and back onto the metformin only.  The diarrhea has resolved with the switch and the blood sugar has gradually improved.          No change in urination or dysuria or hematuria         No heat or cold intolerance         No rash or skin lesions or breakdown         No unusual bruising or bleeding         No depression or anxiety              Remainder of review negative except as previously noted    PMHX: Reviewed  PSHX: Reviewed  FHX:    Reviewed  SHX:    Reviewed    PE:  VSS  GEN: WDWN, alert ,NAD, conversant and co-operative, but minimally conversant, deferring to her son for most of the history.  EYES: Conj/lids unremarkable, sclera anicteric  RESP: Efforts unlabored  CV:: Heart RRR w/o mgr    MSK: Gait normal No CCE noted  NEURO: VIEIRA. No facial asymmetry or confusion noted  SKIN: Warm and dry    IMPRESSION:  Hospital d/c f/up  CVA- Left thalamic stroke, felt to be embolic  --------MRI head w/o contrast  -------------Impression:    -----------------Focus of acute ischemia within the anterior aspect of the left thalamus abutting the posterior limb of the left internal capsule.  -----------------Findings of minimal chronic microvascular ischemia.  -----------------There appears to be at least a moderate stenosis of the distal M1 segment of the left middle cerebral artery. There is flow beyond this area of narrowing      Difficulty w/ speech  Confusion  Hypertensive urgency, BP much better controlled at this time  Cerebral arteritis  Disorders arteries and arterioles, carotid disease less than 50% bilateral  DM2 s/ microalbuminemia  CKD 3b- lab from hospitalization included   ---EGFR, Non African American 43 (L) >89 mL/min    ---BUN 20.1 (H) 7.0 - 18.0 mg/dL    ---Creatinine 1.19 (H) 0.55 - 1.02 mg/dl  B12 deficiency  NOREEN on CKD            PLAN:  Post Hospital D/C Medications have been reconciled  Amlodipine 10mg qd  ASA 81mg  Vitamin D3 1K qd  Lofibra 134mg qd  Losartan 100mg qd  Metformin 500 mg bid  Metoprolol 25mg BID  Omeprazole 20mg qd  Atorvastatin 80mg qd  PLAVIK 75 qd  B12 1K qd  HCTZ 12.5mg qd    CONSULT f/up NEUROLOGY-  No driving til f/up w/ Neurology    Home health ordered, the patient participating in digital hypertension but it would also be helpful to have a nurse check on her over the next couple of weeks to ensure that the blood pressure and other vitals were stable.  Son reported that the neurologist did not feel she needed PT/OT.  She will be going to speech therapy at the facility.  Reviewed other options for care, daughter is in the home health care business and able to facilitate options.  Recommend she continue participate with digital hypertension and that they advised if the number seem to be elevating, or to low.  A reasonable range at this time would be 120-150 although our goal would be to eventually get her less than 140.  Also continue to monitor blood sugar  Call prn

## 2022-05-31 ENCOUNTER — TELEPHONE (OUTPATIENT)
Dept: CARDIOLOGY | Facility: CLINIC | Age: 82
End: 2022-05-31
Payer: MEDICARE

## 2022-05-31 DIAGNOSIS — E11.69 HYPERLIPIDEMIA ASSOCIATED WITH TYPE 2 DIABETES MELLITUS: ICD-10-CM

## 2022-05-31 DIAGNOSIS — I15.2 HYPERTENSION ASSOCIATED WITH DIABETES: Primary | ICD-10-CM

## 2022-05-31 DIAGNOSIS — E11.59 HYPERTENSION ASSOCIATED WITH DIABETES: Primary | ICD-10-CM

## 2022-05-31 DIAGNOSIS — E78.5 HYPERLIPIDEMIA ASSOCIATED WITH TYPE 2 DIABETES MELLITUS: ICD-10-CM

## 2022-06-01 ENCOUNTER — PATIENT OUTREACH (OUTPATIENT)
Dept: ADMINISTRATIVE | Facility: HOSPITAL | Age: 82
End: 2022-06-01
Payer: MEDICARE

## 2022-06-10 RX ORDER — CLOPIDOGREL BISULFATE 75 MG/1
75 TABLET ORAL DAILY
Qty: 30 TABLET | Refills: 0 | Status: CANCELLED | OUTPATIENT
Start: 2022-06-10 | End: 2022-07-10

## 2022-06-10 RX ORDER — ATORVASTATIN CALCIUM 40 MG/1
40 TABLET, FILM COATED ORAL NIGHTLY
Qty: 90 TABLET | Refills: 3 | Status: CANCELLED | OUTPATIENT
Start: 2022-06-10

## 2022-06-10 NOTE — TELEPHONE ENCOUNTER
----- Message from Poppy Tracey sent at 6/10/2022  1:01 PM CDT -----  Contact: [phone]  Requesting an RX refill or new RX.  Is this a refill or new RX: NEW  RX name and strength :clopidogreL (PLAVIX) 75 mg tablet  Is this a 30 day or 90 day RX: 90  Pharmacy name and phone #   [facility] - [facility] [address]  [address] [address]  Phone: [phone] Fax: [phone]    The doctors have asked that we provide their patients with the following 2 reminders -- prescription refills can take up to 72 hours, and a friendly reminder that in the future you can use your [facility] account to request refills: yes         Requesting an RX refill or new RX.  Is this a refill or new RX: NEW  RX name and strength:atorvastatin (LIPITOR) 40 MG tablet  Is this a 30 day or 90 day RX: 90  Pharmacy name and phone #   [facility] - [place], [place] - [address]  [address] [id number]  Phone: [phone] Fax: [phone]      The doctors have asked that we provide their patients with the following 2 reminders -- prescription refills can take up to 72 hours, and a friendly reminder that in the future you can use your [facility] account to request refills: yes

## 2022-06-10 NOTE — TELEPHONE ENCOUNTER
Request for 2 prescriptions received today  Atorvastatin 40 mg the patient has been taking  Plavix 75 mg that is a new prescription since discharge from the hospital for stroke  She  had appointment with her neurologist after her TCC and was to obtain their recommendations as to continuation of Plavix      Please clarify that she is to continue the Plavix and if the neurologist will be prescribing or requested that primary care prescribe

## 2022-06-13 ENCOUNTER — LAB VISIT (OUTPATIENT)
Dept: LAB | Facility: HOSPITAL | Age: 82
End: 2022-06-13
Payer: MEDICARE

## 2022-06-13 DIAGNOSIS — E11.69 HYPERLIPIDEMIA ASSOCIATED WITH TYPE 2 DIABETES MELLITUS: ICD-10-CM

## 2022-06-13 DIAGNOSIS — E78.5 HYPERLIPIDEMIA ASSOCIATED WITH TYPE 2 DIABETES MELLITUS: ICD-10-CM

## 2022-06-13 DIAGNOSIS — I15.2 HYPERTENSION ASSOCIATED WITH DIABETES: ICD-10-CM

## 2022-06-13 DIAGNOSIS — E11.59 HYPERTENSION ASSOCIATED WITH DIABETES: ICD-10-CM

## 2022-06-13 LAB
ALBUMIN SERPL BCP-MCNC: 3.9 G/DL (ref 3.5–5.2)
ALP SERPL-CCNC: 37 U/L (ref 55–135)
ALT SERPL W/O P-5'-P-CCNC: 23 U/L (ref 10–44)
ANION GAP SERPL CALC-SCNC: 12 MMOL/L (ref 8–16)
AST SERPL-CCNC: 23 U/L (ref 10–40)
BILIRUB SERPL-MCNC: 0.5 MG/DL (ref 0.1–1)
BUN SERPL-MCNC: 18 MG/DL (ref 8–23)
CALCIUM SERPL-MCNC: 9.5 MG/DL (ref 8.7–10.5)
CHLORIDE SERPL-SCNC: 105 MMOL/L (ref 95–110)
CHOLEST SERPL-MCNC: 176 MG/DL (ref 120–199)
CHOLEST/HDLC SERPL: 3.5 {RATIO} (ref 2–5)
CO2 SERPL-SCNC: 23 MMOL/L (ref 23–29)
CREAT SERPL-MCNC: 1 MG/DL (ref 0.5–1.4)
EST. GFR  (AFRICAN AMERICAN): >60 ML/MIN/1.73 M^2
EST. GFR  (NON AFRICAN AMERICAN): 53 ML/MIN/1.73 M^2
GLUCOSE SERPL-MCNC: 112 MG/DL (ref 70–110)
HDLC SERPL-MCNC: 51 MG/DL (ref 40–75)
HDLC SERPL: 29 % (ref 20–50)
LDLC SERPL CALC-MCNC: 99.8 MG/DL (ref 63–159)
NONHDLC SERPL-MCNC: 125 MG/DL
POTASSIUM SERPL-SCNC: 4.4 MMOL/L (ref 3.5–5.1)
PROT SERPL-MCNC: 6.6 G/DL (ref 6–8.4)
SODIUM SERPL-SCNC: 140 MMOL/L (ref 136–145)
TRIGL SERPL-MCNC: 126 MG/DL (ref 30–150)
TSH SERPL DL<=0.005 MIU/L-ACNC: 2.42 UIU/ML (ref 0.4–4)

## 2022-06-13 PROCEDURE — 80061 LIPID PANEL: CPT | Performed by: INTERNAL MEDICINE

## 2022-06-13 PROCEDURE — 80053 COMPREHEN METABOLIC PANEL: CPT | Performed by: INTERNAL MEDICINE

## 2022-06-13 PROCEDURE — 36415 COLL VENOUS BLD VENIPUNCTURE: CPT | Mod: PO | Performed by: INTERNAL MEDICINE

## 2022-06-13 PROCEDURE — 84443 ASSAY THYROID STIM HORMONE: CPT | Performed by: INTERNAL MEDICINE

## 2022-06-15 ENCOUNTER — OFFICE VISIT (OUTPATIENT)
Dept: CARDIOLOGY | Facility: CLINIC | Age: 82
End: 2022-06-15
Payer: MEDICARE

## 2022-06-15 ENCOUNTER — TELEPHONE (OUTPATIENT)
Dept: INTERNAL MEDICINE | Facility: CLINIC | Age: 82
End: 2022-06-15
Payer: MEDICARE

## 2022-06-15 ENCOUNTER — TELEPHONE (OUTPATIENT)
Dept: CARDIOLOGY | Facility: CLINIC | Age: 82
End: 2022-06-15
Payer: MEDICARE

## 2022-06-15 VITALS
DIASTOLIC BLOOD PRESSURE: 64 MMHG | HEART RATE: 72 BPM | HEIGHT: 66 IN | WEIGHT: 175.06 LBS | SYSTOLIC BLOOD PRESSURE: 156 MMHG | BODY MASS INDEX: 28.14 KG/M2

## 2022-06-15 DIAGNOSIS — E11.22 CKD STAGE 3 SECONDARY TO DIABETES: ICD-10-CM

## 2022-06-15 DIAGNOSIS — I70.0 AORTIC ATHEROSCLEROSIS: ICD-10-CM

## 2022-06-15 DIAGNOSIS — E78.5 HYPERLIPIDEMIA ASSOCIATED WITH TYPE 2 DIABETES MELLITUS: ICD-10-CM

## 2022-06-15 DIAGNOSIS — R91.1 LUNG NODULE: ICD-10-CM

## 2022-06-15 DIAGNOSIS — E11.59 HYPERTENSION ASSOCIATED WITH DIABETES: ICD-10-CM

## 2022-06-15 DIAGNOSIS — I51.89 LEFT VENTRICULAR DIASTOLIC DYSFUNCTION WITH PRESERVED SYSTOLIC FUNCTION: ICD-10-CM

## 2022-06-15 DIAGNOSIS — G45.9 TRANSIENT CEREBRAL ISCHEMIA, UNSPECIFIED TYPE: ICD-10-CM

## 2022-06-15 DIAGNOSIS — E11.69 HYPERLIPIDEMIA ASSOCIATED WITH TYPE 2 DIABETES MELLITUS: ICD-10-CM

## 2022-06-15 DIAGNOSIS — E01.0 THYROMEGALY: ICD-10-CM

## 2022-06-15 DIAGNOSIS — Z95.2 S/P AVR (AORTIC VALVE REPLACEMENT): ICD-10-CM

## 2022-06-15 DIAGNOSIS — I65.23 BILATERAL CAROTID ARTERY STENOSIS: ICD-10-CM

## 2022-06-15 DIAGNOSIS — E66.3 OVERWEIGHT (BMI 25.0-29.9): ICD-10-CM

## 2022-06-15 DIAGNOSIS — N18.30 CKD STAGE 3 SECONDARY TO DIABETES: ICD-10-CM

## 2022-06-15 DIAGNOSIS — I35.0 AORTIC VALVE STENOSIS, ETIOLOGY OF CARDIAC VALVE DISEASE UNSPECIFIED: ICD-10-CM

## 2022-06-15 DIAGNOSIS — I15.2 HYPERTENSION ASSOCIATED WITH DIABETES: ICD-10-CM

## 2022-06-15 DIAGNOSIS — I63.9 CRYPTOGENIC STROKE: Primary | ICD-10-CM

## 2022-06-15 PROCEDURE — 93010 ELECTROCARDIOGRAM REPORT: CPT | Mod: S$GLB,,, | Performed by: INTERNAL MEDICINE

## 2022-06-15 PROCEDURE — 99215 OFFICE O/P EST HI 40 MIN: CPT | Mod: S$GLB,,, | Performed by: NURSE PRACTITIONER

## 2022-06-15 PROCEDURE — 1126F AMNT PAIN NOTED NONE PRSNT: CPT | Mod: CPTII,S$GLB,, | Performed by: NURSE PRACTITIONER

## 2022-06-15 PROCEDURE — 3051F PR MOST RECENT HEMOGLOBIN A1C LEVEL 7.0 - < 8.0%: ICD-10-PCS | Mod: CPTII,S$GLB,, | Performed by: NURSE PRACTITIONER

## 2022-06-15 PROCEDURE — 1159F PR MEDICATION LIST DOCUMENTED IN MEDICAL RECORD: ICD-10-PCS | Mod: CPTII,S$GLB,, | Performed by: NURSE PRACTITIONER

## 2022-06-15 PROCEDURE — 93005 ELECTROCARDIOGRAM TRACING: CPT | Mod: S$GLB,,, | Performed by: NURSE PRACTITIONER

## 2022-06-15 PROCEDURE — 99215 PR OFFICE/OUTPT VISIT, EST, LEVL V, 40-54 MIN: ICD-10-PCS | Mod: S$GLB,,, | Performed by: NURSE PRACTITIONER

## 2022-06-15 PROCEDURE — 93005 EKG 12-LEAD: ICD-10-PCS | Mod: S$GLB,,, | Performed by: NURSE PRACTITIONER

## 2022-06-15 PROCEDURE — 3078F DIAST BP <80 MM HG: CPT | Mod: CPTII,S$GLB,, | Performed by: NURSE PRACTITIONER

## 2022-06-15 PROCEDURE — 1101F PR PT FALLS ASSESS DOC 0-1 FALLS W/OUT INJ PAST YR: ICD-10-PCS | Mod: CPTII,S$GLB,, | Performed by: NURSE PRACTITIONER

## 2022-06-15 PROCEDURE — 99499 RISK ADDL DX/OHS AUDIT: ICD-10-PCS | Mod: S$GLB,,, | Performed by: NURSE PRACTITIONER

## 2022-06-15 PROCEDURE — 99999 PR PBB SHADOW E&M-EST. PATIENT-LVL IV: CPT | Mod: PBBFAC,,, | Performed by: NURSE PRACTITIONER

## 2022-06-15 PROCEDURE — 93010 EKG 12-LEAD: ICD-10-PCS | Mod: S$GLB,,, | Performed by: INTERNAL MEDICINE

## 2022-06-15 PROCEDURE — 3288F FALL RISK ASSESSMENT DOCD: CPT | Mod: CPTII,S$GLB,, | Performed by: NURSE PRACTITIONER

## 2022-06-15 PROCEDURE — 3077F PR MOST RECENT SYSTOLIC BLOOD PRESSURE >= 140 MM HG: ICD-10-PCS | Mod: CPTII,S$GLB,, | Performed by: NURSE PRACTITIONER

## 2022-06-15 PROCEDURE — 1160F RVW MEDS BY RX/DR IN RCRD: CPT | Mod: CPTII,S$GLB,, | Performed by: NURSE PRACTITIONER

## 2022-06-15 PROCEDURE — 99999 PR PBB SHADOW E&M-EST. PATIENT-LVL IV: ICD-10-PCS | Mod: PBBFAC,,, | Performed by: NURSE PRACTITIONER

## 2022-06-15 PROCEDURE — 99499 UNLISTED E&M SERVICE: CPT | Mod: S$GLB,,, | Performed by: NURSE PRACTITIONER

## 2022-06-15 PROCEDURE — 1101F PT FALLS ASSESS-DOCD LE1/YR: CPT | Mod: CPTII,S$GLB,, | Performed by: NURSE PRACTITIONER

## 2022-06-15 PROCEDURE — 3078F PR MOST RECENT DIASTOLIC BLOOD PRESSURE < 80 MM HG: ICD-10-PCS | Mod: CPTII,S$GLB,, | Performed by: NURSE PRACTITIONER

## 2022-06-15 PROCEDURE — 3077F SYST BP >= 140 MM HG: CPT | Mod: CPTII,S$GLB,, | Performed by: NURSE PRACTITIONER

## 2022-06-15 PROCEDURE — 3051F HG A1C>EQUAL 7.0%<8.0%: CPT | Mod: CPTII,S$GLB,, | Performed by: NURSE PRACTITIONER

## 2022-06-15 PROCEDURE — 1160F PR REVIEW ALL MEDS BY PRESCRIBER/CLIN PHARMACIST DOCUMENTED: ICD-10-PCS | Mod: CPTII,S$GLB,, | Performed by: NURSE PRACTITIONER

## 2022-06-15 PROCEDURE — 1126F PR PAIN SEVERITY QUANTIFIED, NO PAIN PRESENT: ICD-10-PCS | Mod: CPTII,S$GLB,, | Performed by: NURSE PRACTITIONER

## 2022-06-15 PROCEDURE — 1159F MED LIST DOCD IN RCRD: CPT | Mod: CPTII,S$GLB,, | Performed by: NURSE PRACTITIONER

## 2022-06-15 PROCEDURE — 3288F PR FALLS RISK ASSESSMENT DOCUMENTED: ICD-10-PCS | Mod: CPTII,S$GLB,, | Performed by: NURSE PRACTITIONER

## 2022-06-15 RX ORDER — ROSUVASTATIN CALCIUM 40 MG/1
40 TABLET, COATED ORAL NIGHTLY
Qty: 90 TABLET | Refills: 3 | Status: SHIPPED | OUTPATIENT
Start: 2022-06-15 | End: 2022-06-17 | Stop reason: SDUPTHER

## 2022-06-15 RX ORDER — ATORVASTATIN CALCIUM 80 MG/1
80 TABLET, FILM COATED ORAL DAILY
COMMUNITY
End: 2022-06-15 | Stop reason: ALTCHOICE

## 2022-06-15 NOTE — TELEPHONE ENCOUNTER
Spoke to pt's son, Alphonso. He stated that the pt was supposed to see neurology for determination on whether or not she is supposed to continue on the plavix. The pt's appointment with neurology is on Friday. He stated that the pt is running out tomorrow. He stated that the need a rx sent ot [facility] pharmacy for a 30 day supply, until they find out. He stated that he will keep us posted on this.    He stated that the pt needs refills on the HCTZ 12.5 mg.  He stated that the pt was increased to atorvastatin 80 mg daily. He stated that the pt has plenty on hand, and no need of refill at this time.

## 2022-06-15 NOTE — PROGRESS NOTES
Please inform the patient that lipids are better.  The rest of the blood work is fine. I think she is due for an appointment.

## 2022-06-15 NOTE — TELEPHONE ENCOUNTER
----- Message from Rosa Carrington sent at 6/15/2022  9:08 AM CDT -----  Contact: [phone]  Pt's son called to speak to the nurse in reference to getting medications refill. Please Advise

## 2022-06-15 NOTE — PROGRESS NOTES
Ms. Liu is a patient of Dr. Gray and was last seen in [facility] Cardiology Visit 4/26/21.      Subjective:   Patient ID:  Zaida Liu is a 82 y.o. female who presents for follow-up of Hyperlipidemia    Medical Hx:  Bicuspid Aortic valve with severe stenosis s/p bioprosthetic AVR in 8/25/15 (Dr. Winn)  -[facility] prior to AVR, luminal irregularities  Postoperative DVT  HTN  HLD  DM type II  Mild bilateral carotid artery disease    HPI  Ms. Liu is in clinic today for routine follow up.  She had an acute thrombotic CVA in the left thalmus last month and was started on plavix and asa was continued.  The ultrasound shows mild disease, <50% bilateral.  Her ATV was increased from 40 to 80 mg with a drop in LDL from 130 to 100.  She is followed by the digital hypertension program and since her discharge her blood pressures have been 140-150s/50-60s.        Review of Systems   Constitutional: Negative for decreased appetite, diaphoresis, malaise/fatigue, weight gain and weight loss.   Eyes: Negative for visual disturbance.   Cardiovascular: Negative for chest pain, claudication, dyspnea on exertion, irregular heartbeat, leg swelling, near-syncope, orthopnea, palpitations, paroxysmal nocturnal dyspnea and syncope.        Denies chest pressure   Respiratory: Negative for cough, hemoptysis, shortness of breath, sleep disturbances due to breathing and snoring.    Endocrine: Negative for cold intolerance and heat intolerance.   Hematologic/Lymphatic: Negative for bleeding problem. Does not bruise/bleed easily.   Musculoskeletal: Negative for myalgias.   Gastrointestinal: Negative for bloating, abdominal pain, anorexia, change in bowel habit, constipation, diarrhea, nausea and vomiting.   Neurological: Negative for difficulty with concentration, disturbances in coordination, excessive daytime sleepiness, dizziness, headaches, light-headedness, loss of balance, numbness and weakness.        Aphasia   Psychiatric/Behavioral:  "Positive for memory loss. The patient does not have insomnia.        Allergies and current medications updated and reviewed:  Review of patient's allergies indicates:   Allergen Reactions    Codeine Other (See Comments)     Current Outpatient Medications   Medication Sig    amLODIPine (NORVASC) 10 MG tablet TAKE 1 TABLET EVERY DAY    aspirin (ECOTRIN) 81 MG EC tablet Take 81 mg by mouth once daily.    atorvastatin (LIPITOR) 80 MG tablet Take 80 mg by mouth once daily.    clopidogreL (PLAVIX) 75 mg tablet Take 75 mg by mouth once daily.    cyanocobalamin (VITAMIN B-12) 1000 MCG tablet Take 1,000 mcg by mouth once daily.    fenofibrate micronized (LOFIBRA) 134 MG Cap Take 1 capsule (134 mg total) by mouth daily with breakfast.    hydroCHLOROthiazide (HYDRODIURIL) 12.5 MG Tab Take 1 tablet (12.5 mg total) by mouth once daily.    losartan (COZAAR) 100 MG tablet Take 1 tablet (100 mg total) by mouth once daily.    metFORMIN (GLUCOPHAGE) 500 MG tablet Take 1 tablet (500 mg total) by mouth 2 (two) times daily with meals.    metoprolol tartrate (LOPRESSOR) 25 MG tablet Take 1 tablet (25 mg total) by mouth 2 (two) times daily.    omeprazole (PRILOSEC) 20 MG capsule Take 20 mg by mouth every morning.    vitamin D 1000 units Tab Take 1,000 Units by mouth once daily.     No current facility-administered medications for this visit.       Objective:        BP (!) 156/64   Pulse 72   Ht 5' 6" (1.676 m)   Wt 79.4 kg (175 lb 0.7 oz)   BMI 28.25 kg/m²       Physical Exam  Vitals and nursing note reviewed.   Constitutional:       General: She is not in acute distress.     Appearance: Normal appearance. She is well-developed. She is not diaphoretic.   HENT:      Head: Normocephalic and atraumatic.   Eyes:      General: Lids are normal. No scleral icterus.     Conjunctiva/sclera: Conjunctivae normal.   Neck:      Thyroid: Thyromegaly present.      Vascular: Normal carotid pulses. No carotid bruit, hepatojugular reflux " or JVD.   Cardiovascular:      Rate and Rhythm: Normal rate and regular rhythm.      Chest Wall: PMI is not displaced.      Pulses: Intact distal pulses.           Carotid pulses are 2+ on the right side and 2+ on the left side.       Radial pulses are 2+ on the right side and 2+ on the left side.        Dorsalis pedis pulses are 2+ on the right side and 2+ on the left side.        Posterior tibial pulses are 2+ on the right side and 2+ on the left side.      Heart sounds: S1 normal and S2 normal. Murmur heard.    Harsh midsystolic murmur is present with a grade of 1/6 at the upper right sternal border radiating to the neck.    No friction rub. No gallop.   Pulmonary:      Effort: Pulmonary effort is normal. No respiratory distress.      Breath sounds: Normal breath sounds. No decreased breath sounds, wheezing, rhonchi or rales.   Chest:      Chest wall: No tenderness.   Abdominal:      General: Bowel sounds are normal. There is no distension or abdominal bruit.      Palpations: Abdomen is soft. There is no fluid wave or pulsatile mass.      Tenderness: There is no abdominal tenderness.   Musculoskeletal:      Cervical back: Neck supple.   Skin:     General: Skin is warm and dry.      Findings: No rash.      Nails: There is no clubbing.   Neurological:      Mental Status: She is alert and oriented to person, place, and time.      Gait: Gait normal.   Psychiatric:         Speech: Speech normal.         Behavior: Behavior normal.         Thought Content: Thought content normal.         Judgment: Judgment normal.         Chemistry        Component Value Date/Time     06/13/2022 1041    K 4.4 06/13/2022 1041     06/13/2022 1041    CO2 23 06/13/2022 1041    BUN 18 06/13/2022 1041    CREATININE 1.0 06/13/2022 1041     (H) 06/13/2022 1041        Component Value Date/Time    CALCIUM 9.5 06/13/2022 1041    ALKPHOS 37 (L) 06/13/2022 1041    AST 23 06/13/2022 1041    ALT 23 06/13/2022 1041    BILITOT 0.5  06/13/2022 1041    ESTGFRAFRICA >60.0 06/13/2022 1041    EGFRNONAA 53.0 (A) 06/13/2022 1041        Lab Results   Component Value Date    LABA1C 7.5 02/21/2022    HGBA1C 7.5 (H) 05/14/2022     Recent Labs   Lab 03/07/22  1502 06/13/22  1041   WBC 9.56  --    Hemoglobin 13.6  --    Hematocrit 41.2  --    MCV 91  --    Platelets 319  --    TSH 2.472 2.422   Cholesterol 231 H 176   HDL 54 51   LDL Cholesterol 130.4 99.8   Triglycerides 233 H 126   HDL/Cholesterol Ratio 23.4 29.0              Test(s) Reviewed  I have reviewed the following in detail:  [] Stress test   [] Angiography   [x] Echocardiogram   [x] Labs   [] Other:         Assessment/Plan:   1. Cryptogenic stroke  Continues to have some word finding and memory issues.  Son states that she has follow up with neuro next week.  Bubble study was negative.  TTE with normal systolic function and no reported vegetation on her valves.  ECG reviewed today with Dr. Domingo shows NSR with non-specific T wave abnormalities.  No event monitor done to evaluate for afib source.  Will get event monitor to evaluate.  Instructed to d/w neuro whether she can continue just the plavix or if she needs to remain on the ASA as well.     2. Left ventricular diastolic dysfunction with preserved systolic function  Well compensated. NYHA class I. No changes.     3. Hypertension associated with diabetes  BP elevated today.  She is in the acute period following brain injury so will tolerate higher bp <150/90 for now.     4. Bilateral carotid artery stenosis  Mild disease on outside [facility]    5. Aortic atherosclerosis      6. Hyperlipidemia associated with type 2 diabetes mellitus  LDL not at goal <70.  Change ATV 80 mg to Ros 40 mg.  Encouraged mediterranean diet and exercise.  If LDL remains above goal, would add zetia 10 mg.  CMP/lipids in 3 months.     - rosuvastatin (CRESTOR) 40 MG Tab; Take 1 tablet (40 mg total) by mouth every evening.  Dispense: 90 tablet; Refill: 3  -  Comprehensive Metabolic Panel; Future  - Lipid Panel; Future    7. Aortic valve stenosis, etiology of cardiac valve disease unspecified  Valve approaching 10 years was evaluated by TTE last month at [facility] and reportedly was functioning normally.  Would plan to repeat TTE in 2 years unless there is a change in exam findings.     8. S/P AVR (aortic valve replacement)      9. CKD stage 3 secondary to diabetes      10. Lung nodule  Seen previously on CT     11. Transient cerebral ischemia, unspecified type     - Cardiac event monitor; Future    12. Thyromegaly  Lab Results   Component Value Date    TSH 2.422 06/13/2022    C7TZTON 7.5 10/16/2017    FREET4 1.01 04/19/2021     FNA done 3/23/22 which was c/w benign follicular nodule.    13. Overweight (BMI 25.0-29.9)  BMI 28.3 Encouraged CV exercise for 30 minutes a day for 5 days a week.      Patient was discussed with but not examined by Dr. Domingo    60 minutes spent reviewing outside records, interviewing/examining/answering questions, collaborating, and documenting on this patient.    A copy of this note will be forwarded to Dr. Gray and Dr. Cabrales.     Follow up in about 6 months (around 12/15/2022).

## 2022-06-15 NOTE — TELEPHONE ENCOUNTER
----- Message from Cristela Gray MD sent at 6/15/2022  5:39 AM CDT -----  Please inform the patient that lipids are better.  The rest of the blood work is fine. I think she is due for an appointment.

## 2022-06-16 ENCOUNTER — TELEPHONE (OUTPATIENT)
Dept: INTERNAL MEDICINE | Facility: CLINIC | Age: 82
End: 2022-06-16
Payer: MEDICARE

## 2022-06-16 VITALS — DIASTOLIC BLOOD PRESSURE: 62 MMHG | SYSTOLIC BLOOD PRESSURE: 137 MMHG

## 2022-06-16 DIAGNOSIS — I15.2 HYPERTENSION ASSOCIATED WITH DIABETES: Primary | ICD-10-CM

## 2022-06-16 DIAGNOSIS — E11.59 HYPERTENSION ASSOCIATED WITH DIABETES: Primary | ICD-10-CM

## 2022-06-16 RX ORDER — CLOPIDOGREL BISULFATE 75 MG/1
75 TABLET ORAL DAILY
Qty: 30 TABLET | Refills: 0 | Status: SHIPPED | OUTPATIENT
Start: 2022-06-16 | End: 2022-07-15

## 2022-06-16 RX ORDER — HYDROCHLOROTHIAZIDE 12.5 MG/1
12.5 TABLET ORAL DAILY
Qty: 90 TABLET | Refills: 0 | Status: SHIPPED | OUTPATIENT
Start: 2022-06-16 | End: 2022-09-07 | Stop reason: SDUPTHER

## 2022-06-16 NOTE — TELEPHONE ENCOUNTER
escripted HCTZ  #90 to local [facility], can still send to mail order if they decide to use

## 2022-06-17 DIAGNOSIS — E78.5 HYPERLIPIDEMIA ASSOCIATED WITH TYPE 2 DIABETES MELLITUS: ICD-10-CM

## 2022-06-17 DIAGNOSIS — E11.69 HYPERLIPIDEMIA ASSOCIATED WITH TYPE 2 DIABETES MELLITUS: ICD-10-CM

## 2022-06-18 RX ORDER — ROSUVASTATIN CALCIUM 40 MG/1
40 TABLET, COATED ORAL NIGHTLY
Qty: 30 TABLET | Refills: 3 | Status: SHIPPED | OUTPATIENT
Start: 2022-06-18 | End: 2022-09-07 | Stop reason: SDUPTHER

## 2022-06-20 ENCOUNTER — CLINICAL SUPPORT (OUTPATIENT)
Dept: CARDIOLOGY | Facility: HOSPITAL | Age: 82
End: 2022-06-20
Attending: NURSE PRACTITIONER
Payer: MEDICARE

## 2022-06-20 DIAGNOSIS — G45.9 TRANSIENT CEREBRAL ISCHEMIA, UNSPECIFIED TYPE: ICD-10-CM

## 2022-07-08 ENCOUNTER — TELEPHONE (OUTPATIENT)
Dept: INTERNAL MEDICINE | Facility: CLINIC | Age: 82
End: 2022-07-08
Payer: MEDICARE

## 2022-07-08 DIAGNOSIS — Z12.31 ENCOUNTER FOR SCREENING MAMMOGRAM FOR MALIGNANT NEOPLASM OF BREAST: Primary | ICD-10-CM

## 2022-07-08 NOTE — TELEPHONE ENCOUNTER
----- Message from Ilda Vora sent at 7/8/2022 10:16 AM CDT -----  Type: Patient Call Back    Who called: self     What is the request in detail: Pt is asking to get her mammogram, please place orders     Can the clinic reply by [facility]?    Would the patient rather a call back or a response via [facility]? Call     Best call back number: [phone] (home) [phone] (work)

## 2022-07-29 ENCOUNTER — DOCUMENTATION ONLY (OUTPATIENT)
Dept: CARDIOLOGY | Facility: HOSPITAL | Age: 82
End: 2022-07-29
Payer: MEDICARE

## 2022-07-29 NOTE — PROGRESS NOTES
Per [facility], patient received 30 day event monitor by mail; however, did not proceed with testing.

## 2022-08-05 ENCOUNTER — HOSPITAL ENCOUNTER (OUTPATIENT)
Dept: RADIOLOGY | Facility: HOSPITAL | Age: 82
Discharge: HOME | End: 2022-08-05
Attending: INTERNAL MEDICINE
Payer: MEDICARE

## 2022-08-05 DIAGNOSIS — Z12.31 ENCOUNTER FOR SCREENING MAMMOGRAM FOR MALIGNANT NEOPLASM OF BREAST: ICD-10-CM

## 2022-08-05 PROCEDURE — 77063 MAMMO DIGITAL SCREENING BILAT WITH TOMO: ICD-10-PCS | Mod: 26,,, | Performed by: RADIOLOGY

## 2022-08-05 PROCEDURE — 77067 SCR MAMMO BI INCL CAD: CPT | Mod: 26,,, | Performed by: RADIOLOGY

## 2022-08-05 PROCEDURE — 77067 MAMMO DIGITAL SCREENING BILAT WITH TOMO: ICD-10-PCS | Mod: 26,,, | Performed by: RADIOLOGY

## 2022-08-05 PROCEDURE — 77063 BREAST TOMOSYNTHESIS BI: CPT | Mod: 26,,, | Performed by: RADIOLOGY

## 2022-08-05 PROCEDURE — 77067 SCR MAMMO BI INCL CAD: CPT | Mod: TC,PO

## 2022-08-08 ENCOUNTER — TELEPHONE (OUTPATIENT)
Dept: INTERNAL MEDICINE | Facility: CLINIC | Age: 82
End: 2022-08-08
Payer: MEDICARE

## 2022-08-08 NOTE — TELEPHONE ENCOUNTER
----- Message from Rocio Raymundo sent at 8/8/2022  7:35 AM CDT -----  Contact: pt [phone]    Patient needs to know what dosage should she be taking on the following Rx     hydroCHLOROthiazide (HYDRODIURIL)     12.5 or 50    Please call and advise.    Thank You

## 2022-08-08 NOTE — TELEPHONE ENCOUNTER
Spoke to pt. Pt wanted to know which dose of HCTZ she is supposed to be taking.  I clarified that she is taking HCTZ 12.5 mg daily.

## 2022-08-08 NOTE — TELEPHONE ENCOUNTER
----- Message from Karina Wilson MD sent at 8/6/2022  1:21 PM CDT -----  Please note that your mammogram was read as follows  Impression:  There is no mammographic evidence of malignancy.   valentín

## 2022-08-12 ENCOUNTER — TELEPHONE (OUTPATIENT)
Dept: INTERNAL MEDICINE | Facility: CLINIC | Age: 82
End: 2022-08-12
Payer: MEDICARE

## 2022-08-12 NOTE — TELEPHONE ENCOUNTER
Spoke to Roopa with [facility]. I advised that the POC was received on Tuesday, 8/9/22. Advised that Dr. Cabrales has been out of the office this week. Will address when she returns next week.  She was ok with this.

## 2022-08-12 NOTE — TELEPHONE ENCOUNTER
----- Message from Yuli Palacios sent at 8/12/2022  8:05 AM CDT -----  Contact: Speech Therapist/Sylvia/[phone]  Sylvia said that she is calling in regards to needing to check the status of a plan of care that was faxed to the office on 8/9 for the doctor to sign. Please advise

## 2022-09-05 NOTE — PROGRESS NOTES
CC:    82-year-old female with diabetes, hypertension, hyperlipidemia, CKD 3,  Left ventricular diastolic dysfunction with preserved systolic function and aortic stenosis status post valve replacement and recent left thalamic stroke.    S/p Left thalamic stroke, tx: Plavix 75mg and ASA 81mg  -some confusion with Plavix use and patient has been off for 2 months, however in review of medical records note that her neurologist did recommend continuation and it will be resumed.  Patient remains in speech therapy she feels that it is helpful, her son who is present today reported that she still struggles with word finding.    DM tx metformin 500mg BID, patient was feeling ill and her blood sugars were elevated and she had been recommended to take the metformin b.i.d. with improvement in her symptoms related to presumed hyperglycemia.  However she started to have some symptoms related to the 2nd metformin dose and she has resumed 1 metformin daily.  Will need to recheck her A1c to see if this is sufficient as it had been historically.    HTN, tx losartan 100mg, HCTZ 12.5, metoprolol 25 BID, and amlodipine 10mg.    Patient's blood pressure today 138/50  She is complaining of lower extremity edema, and as she is on the amlodipine it is the likely culprit, son also reminds us that patient had been on 50 mg of HCTZ that was decreased to 12.5 when she was discharged home.    HLD, tx fenofibrate 134mg and rosuvastatin  LDL is pending  Patient denied angina or symptoms of a recurrent stroke.  She be given an event monitor but son noted that they had been not placed did as there were many instructions in it arrived at a time when patient's spouse was declining prior to his recent death.    LVDD w/ pEF    Review of information gleaned at [facility]:  MRI +, revealed a left thalamic stroke  US Carotids:   ----Impression:   1. No hemodynamically significant stenosis on either side (<50% stenosis). There is scattered calcified plaque  seen.  2. Forward flow both vertebral arteries.  Doppler color flow, and Doppler spectral analysis of the carotid system   ----Right plaque: mild  Additional comment(s): There is moderate plaque within the right carotid bulb extending into the origins of the internal and external carotid arteries.   ----Left plaque: mild  Additional comment(s): There is mild atherosclerotic plaque within the carotid bulb extending into the origin of the left external carotid artery  CXR:  ----Impression:   1. Postoperative findings.  2. Questionable infiltrate at the left lung base. If indicated, a follow-up exam with lateral may be helpful for further evaluation.  Lateral CXR:  ----Findings: There are sternal wires. The heart is upper limits of normal in size. There is no vascular congestion. Mediastinum is unremarkable.      Neurology Recs: Dr. Kaveh Khoobehi   MRI/MRA reviewed  Echo no thrombus or shunt  ASA/Plavix/statin for stroke prevention  Adequate platelet inhibition  Normotension goal  Poorly controlled risk factors, BP elevated, , increase lipitor to 80mg, I think she is only on 40mg at home(NOW on ROSUVASTATIN)  Stroke education  Outpatient ST    Speech  --Patient presenting with moderate cognitive-linguistic deficits c/b disorientation, anomia, decreased awareness and memory   --Patient is without oropharyngeal dysphagia and is safe to continue a regular/thin diet     Echo:  -CONCLUSIONS   Mild left ventricular hypertrophy.   Normal left ventricular systolic function.   Left ventricular ejection fraction is estimated at 65%.   Normal bioprosthetic aortic valve.   A bubble study was done and appeared to be negative at rest and with valsalva.          Medcard:       ROS:         No fever, chills, or night sweats         No visual changes or d/c         No dysphagia or early satiety;               No cough or wheezing         No PND or orthopnea         No chest pain or palpitations         No weight change or LE  edema         No GERD or abdominal pain         No change in bowel or blood in stool, diarrhea has resolved         No change in urination or dysuria or hematuria         No heat or cold intolerance         No rash or skin lesions or breakdown         No unusual bruising or bleeding         No depression or anxiety              Remainder of review negative except as previously noted    PMHX: Reviewed  PSHX: Reviewed  FHX:    Reviewed  SHX:    Reviewed    PE:  VSS  GEN: WDWN, alert ,NAD, conversant and co-operative, but minimally conversant  EYES: Conj/lids unremarkable, sclera anicteric  RESP: Efforts unlabored, lungs CTA  CV:: Heart RRR w/o mgr, no carotid bruits noted  1+ pedal pulses, + LE edema primarily around the ankles( pt c/o of edema  But no significant pain associated, just discomfort)    MSK: Gait normal No CCE noted  NEURO: VIEIRA. No facial asymmetry or confusion noted  + expressive aphasia- answered direct yes/no questions w/o hesitation  SKIN: Warm and dry    IMPRESSION:  DMw/ microalbuminemia, await A1C update  CKD 3a associated diabetes  HTN associated DM, to resume BP checks at home  HLD, associated DM  H/O completed CVA  - Left thalamic stroke, felt to be embolic, event monitor pending  Expressive aphasia, pt feels better w/ ST  Disorder of arteries and arterioles-Carotid, asx  Aortic atherosclerosis,asx  LE edema, B/L      PLAN:  Continue present meds  Rx update rosuvastatin  Lipid panel pending  RESTART PLAVIX  INCREASE HCTZ 25mg ( was on 50 pre-stroke)  Continue metformin one daily   A1C pending to determine if need qd/bid  Pt needs to complete event monitor, may need to come into office   Call prn  RTC 6 mos

## 2022-09-07 ENCOUNTER — OFFICE VISIT (OUTPATIENT)
Dept: INTERNAL MEDICINE | Facility: CLINIC | Age: 82
End: 2022-09-07
Payer: MEDICARE

## 2022-09-07 VITALS
HEART RATE: 76 BPM | HEIGHT: 66 IN | SYSTOLIC BLOOD PRESSURE: 138 MMHG | TEMPERATURE: 98 F | DIASTOLIC BLOOD PRESSURE: 50 MMHG | RESPIRATION RATE: 20 BRPM | OXYGEN SATURATION: 99 % | BODY MASS INDEX: 27.42 KG/M2 | WEIGHT: 170.63 LBS

## 2022-09-07 DIAGNOSIS — R80.9 TYPE 2 DIABETES MELLITUS WITH MICROALBUMINURIA, WITHOUT LONG-TERM CURRENT USE OF INSULIN: Primary | ICD-10-CM

## 2022-09-07 DIAGNOSIS — E11.59 HYPERTENSION ASSOCIATED WITH DIABETES: ICD-10-CM

## 2022-09-07 DIAGNOSIS — N18.30 CKD STAGE 3 SECONDARY TO DIABETES: ICD-10-CM

## 2022-09-07 DIAGNOSIS — E11.69 HYPERLIPIDEMIA ASSOCIATED WITH TYPE 2 DIABETES MELLITUS: ICD-10-CM

## 2022-09-07 DIAGNOSIS — R47.1 DYSARTHRIA DUE TO ACUTE STROKE: ICD-10-CM

## 2022-09-07 DIAGNOSIS — I63.9 DYSARTHRIA DUE TO ACUTE STROKE: ICD-10-CM

## 2022-09-07 DIAGNOSIS — I70.0 AORTIC ATHEROSCLEROSIS: ICD-10-CM

## 2022-09-07 DIAGNOSIS — I77.9 DISORDER OF ARTERIES AND ARTERIOLES: ICD-10-CM

## 2022-09-07 DIAGNOSIS — Z86.73 H/O COMPLETED STROKE: ICD-10-CM

## 2022-09-07 DIAGNOSIS — E11.22 CKD STAGE 3 SECONDARY TO DIABETES: ICD-10-CM

## 2022-09-07 DIAGNOSIS — E11.29 TYPE 2 DIABETES MELLITUS WITH MICROALBUMINURIA, WITHOUT LONG-TERM CURRENT USE OF INSULIN: Primary | ICD-10-CM

## 2022-09-07 DIAGNOSIS — E78.5 HYPERLIPIDEMIA ASSOCIATED WITH TYPE 2 DIABETES MELLITUS: ICD-10-CM

## 2022-09-07 DIAGNOSIS — I15.2 HYPERTENSION ASSOCIATED WITH DIABETES: ICD-10-CM

## 2022-09-07 PROCEDURE — 1159F MED LIST DOCD IN RCRD: CPT | Mod: CPTII,S$GLB,, | Performed by: INTERNAL MEDICINE

## 2022-09-07 PROCEDURE — 1101F PT FALLS ASSESS-DOCD LE1/YR: CPT | Mod: CPTII,S$GLB,, | Performed by: INTERNAL MEDICINE

## 2022-09-07 PROCEDURE — 3075F SYST BP GE 130 - 139MM HG: CPT | Mod: CPTII,S$GLB,, | Performed by: INTERNAL MEDICINE

## 2022-09-07 PROCEDURE — 99999 PR PBB SHADOW E&M-EST. PATIENT-LVL IV: CPT | Mod: PBBFAC,,, | Performed by: INTERNAL MEDICINE

## 2022-09-07 PROCEDURE — 3288F FALL RISK ASSESSMENT DOCD: CPT | Mod: CPTII,S$GLB,, | Performed by: INTERNAL MEDICINE

## 2022-09-07 PROCEDURE — 3288F PR FALLS RISK ASSESSMENT DOCUMENTED: ICD-10-PCS | Mod: CPTII,S$GLB,, | Performed by: INTERNAL MEDICINE

## 2022-09-07 PROCEDURE — 1101F PR PT FALLS ASSESS DOC 0-1 FALLS W/OUT INJ PAST YR: ICD-10-PCS | Mod: CPTII,S$GLB,, | Performed by: INTERNAL MEDICINE

## 2022-09-07 PROCEDURE — 1160F RVW MEDS BY RX/DR IN RCRD: CPT | Mod: CPTII,S$GLB,, | Performed by: INTERNAL MEDICINE

## 2022-09-07 PROCEDURE — 3078F PR MOST RECENT DIASTOLIC BLOOD PRESSURE < 80 MM HG: ICD-10-PCS | Mod: CPTII,S$GLB,, | Performed by: INTERNAL MEDICINE

## 2022-09-07 PROCEDURE — 1126F AMNT PAIN NOTED NONE PRSNT: CPT | Mod: CPTII,S$GLB,, | Performed by: INTERNAL MEDICINE

## 2022-09-07 PROCEDURE — 99215 PR OFFICE/OUTPT VISIT, EST, LEVL V, 40-54 MIN: ICD-10-PCS | Mod: S$GLB,,, | Performed by: INTERNAL MEDICINE

## 2022-09-07 PROCEDURE — 99215 OFFICE O/P EST HI 40 MIN: CPT | Mod: S$GLB,,, | Performed by: INTERNAL MEDICINE

## 2022-09-07 PROCEDURE — 1126F PR PAIN SEVERITY QUANTIFIED, NO PAIN PRESENT: ICD-10-PCS | Mod: CPTII,S$GLB,, | Performed by: INTERNAL MEDICINE

## 2022-09-07 PROCEDURE — 1159F PR MEDICATION LIST DOCUMENTED IN MEDICAL RECORD: ICD-10-PCS | Mod: CPTII,S$GLB,, | Performed by: INTERNAL MEDICINE

## 2022-09-07 PROCEDURE — 3078F DIAST BP <80 MM HG: CPT | Mod: CPTII,S$GLB,, | Performed by: INTERNAL MEDICINE

## 2022-09-07 PROCEDURE — 3075F PR MOST RECENT SYSTOLIC BLOOD PRESS GE 130-139MM HG: ICD-10-PCS | Mod: CPTII,S$GLB,, | Performed by: INTERNAL MEDICINE

## 2022-09-07 PROCEDURE — 1160F PR REVIEW ALL MEDS BY PRESCRIBER/CLIN PHARMACIST DOCUMENTED: ICD-10-PCS | Mod: CPTII,S$GLB,, | Performed by: INTERNAL MEDICINE

## 2022-09-07 PROCEDURE — 99999 PR PBB SHADOW E&M-EST. PATIENT-LVL IV: ICD-10-PCS | Mod: PBBFAC,,, | Performed by: INTERNAL MEDICINE

## 2022-09-07 RX ORDER — CLOPIDOGREL BISULFATE 75 MG/1
75 TABLET ORAL DAILY
Qty: 90 TABLET | Refills: 1 | Status: SHIPPED | OUTPATIENT
Start: 2022-09-07 | End: 2023-02-06

## 2022-09-07 RX ORDER — HYDROCHLOROTHIAZIDE 25 MG/1
25 TABLET ORAL DAILY
Qty: 90 TABLET | Refills: 3 | Status: SHIPPED | OUTPATIENT
Start: 2022-09-07 | End: 2023-05-30

## 2022-09-07 RX ORDER — ROSUVASTATIN CALCIUM 40 MG/1
40 TABLET, COATED ORAL NIGHTLY
Qty: 90 TABLET | Refills: 1 | Status: SHIPPED | OUTPATIENT
Start: 2022-09-07 | End: 2023-02-06

## 2022-09-07 NOTE — Clinical Note
Chelo Sterling, Saw our mutual patient today and she has not completed her event monitor.  Since she was last seen her spouse .  Son reported a lot of stress at home and confusion with instructions on the event monitor, perhaps they could bring it into their next visit? She was also off the Plavix that I restarted, and her expressive aphasia does not seem significantly improved. Appreciate your assist, Karina Farr

## 2022-09-08 ENCOUNTER — TELEPHONE (OUTPATIENT)
Dept: CARDIOLOGY | Facility: CLINIC | Age: 82
End: 2022-09-08
Payer: MEDICARE

## 2022-09-08 NOTE — TELEPHONE ENCOUNTER
----- Message from Hallie Cruz NP sent at 2022  6:01 PM CDT -----  Please call this patient and arrange for her to have the event monitor placed on her.  She should not have an event monitor until December waiting to start use as we have limited available.    Thanks,  Hallie    ----- Message -----  From: Karina Wilson MD  Sent: 2022   5:17 PM CDT  To: Hallie Cruz NP    Greetings Hallie,  Saw our mutual patient today and she has not completed her event monitor.  Since she was last seen her spouse .  Son reported a lot of stress at home and confusion with instructions on the event monitor, perhaps they could bring it into their next visit?  She was also off the Plavix that I restarted, and her expressive aphasia does not seem significantly improved.  Appreciate your assist, Karina Farr

## 2022-09-09 DIAGNOSIS — R80.9 TYPE 2 DIABETES MELLITUS WITH MICROALBUMINURIA, WITHOUT LONG-TERM CURRENT USE OF INSULIN: Primary | ICD-10-CM

## 2022-09-09 DIAGNOSIS — E11.69 HYPERLIPIDEMIA ASSOCIATED WITH TYPE 2 DIABETES MELLITUS: ICD-10-CM

## 2022-09-09 DIAGNOSIS — E53.8 B12 DEFICIENCY: ICD-10-CM

## 2022-09-09 DIAGNOSIS — E11.59 HYPERTENSION ASSOCIATED WITH DIABETES: ICD-10-CM

## 2022-09-09 DIAGNOSIS — E11.29 TYPE 2 DIABETES MELLITUS WITH MICROALBUMINURIA, WITHOUT LONG-TERM CURRENT USE OF INSULIN: Primary | ICD-10-CM

## 2022-09-09 DIAGNOSIS — E78.5 HYPERLIPIDEMIA ASSOCIATED WITH TYPE 2 DIABETES MELLITUS: ICD-10-CM

## 2022-09-09 DIAGNOSIS — I15.2 HYPERTENSION ASSOCIATED WITH DIABETES: ICD-10-CM

## 2022-09-13 ENCOUNTER — LAB VISIT (OUTPATIENT)
Dept: LAB | Facility: HOSPITAL | Age: 82
End: 2022-09-13
Attending: NURSE PRACTITIONER
Payer: MEDICARE

## 2022-09-13 DIAGNOSIS — E11.29 TYPE 2 DIABETES MELLITUS WITH MICROALBUMINURIA, WITHOUT LONG-TERM CURRENT USE OF INSULIN: ICD-10-CM

## 2022-09-13 DIAGNOSIS — E11.69 HYPERLIPIDEMIA ASSOCIATED WITH TYPE 2 DIABETES MELLITUS: ICD-10-CM

## 2022-09-13 DIAGNOSIS — R80.9 TYPE 2 DIABETES MELLITUS WITH MICROALBUMINURIA, WITHOUT LONG-TERM CURRENT USE OF INSULIN: ICD-10-CM

## 2022-09-13 DIAGNOSIS — E78.5 HYPERLIPIDEMIA ASSOCIATED WITH TYPE 2 DIABETES MELLITUS: ICD-10-CM

## 2022-09-13 LAB
ALBUMIN SERPL BCP-MCNC: 4.1 G/DL (ref 3.5–5.2)
ALP SERPL-CCNC: 45 U/L (ref 55–135)
ALT SERPL W/O P-5'-P-CCNC: 20 U/L (ref 10–44)
ANION GAP SERPL CALC-SCNC: 8 MMOL/L (ref 8–16)
AST SERPL-CCNC: 21 U/L (ref 10–40)
BILIRUB SERPL-MCNC: 0.4 MG/DL (ref 0.1–1)
BUN SERPL-MCNC: 23 MG/DL (ref 8–23)
CALCIUM SERPL-MCNC: 9.8 MG/DL (ref 8.7–10.5)
CHLORIDE SERPL-SCNC: 105 MMOL/L (ref 95–110)
CHOLEST SERPL-MCNC: 173 MG/DL (ref 120–199)
CHOLEST/HDLC SERPL: 3.5 {RATIO} (ref 2–5)
CO2 SERPL-SCNC: 27 MMOL/L (ref 23–29)
CREAT SERPL-MCNC: 1.1 MG/DL (ref 0.5–1.4)
EST. GFR  (NO RACE VARIABLE): 50.5 ML/MIN/1.73 M^2
ESTIMATED AVG GLUCOSE: 157 MG/DL (ref 68–131)
GLUCOSE SERPL-MCNC: 140 MG/DL (ref 70–110)
HBA1C MFR BLD: 7.1 % (ref 4–5.6)
HDLC SERPL-MCNC: 49 MG/DL (ref 40–75)
HDLC SERPL: 28.3 % (ref 20–50)
LDLC SERPL CALC-MCNC: 100 MG/DL (ref 63–159)
NONHDLC SERPL-MCNC: 124 MG/DL
POTASSIUM SERPL-SCNC: 4.3 MMOL/L (ref 3.5–5.1)
PROT SERPL-MCNC: 7 G/DL (ref 6–8.4)
SODIUM SERPL-SCNC: 140 MMOL/L (ref 136–145)
TRIGL SERPL-MCNC: 120 MG/DL (ref 30–150)

## 2022-09-13 PROCEDURE — 83036 HEMOGLOBIN GLYCOSYLATED A1C: CPT | Performed by: INTERNAL MEDICINE

## 2022-09-13 PROCEDURE — 80053 COMPREHEN METABOLIC PANEL: CPT | Performed by: NURSE PRACTITIONER

## 2022-09-13 PROCEDURE — 36415 COLL VENOUS BLD VENIPUNCTURE: CPT | Mod: PO | Performed by: NURSE PRACTITIONER

## 2022-09-13 PROCEDURE — 80061 LIPID PANEL: CPT | Performed by: NURSE PRACTITIONER

## 2022-09-16 ENCOUNTER — TELEPHONE (OUTPATIENT)
Dept: CARDIOLOGY | Facility: CLINIC | Age: 82
End: 2022-09-16
Payer: MEDICARE

## 2022-09-16 DIAGNOSIS — E11.69 HYPERLIPIDEMIA ASSOCIATED WITH TYPE 2 DIABETES MELLITUS: Primary | ICD-10-CM

## 2022-09-16 DIAGNOSIS — I65.23 BILATERAL CAROTID ARTERY STENOSIS: ICD-10-CM

## 2022-09-16 DIAGNOSIS — E78.5 HYPERLIPIDEMIA ASSOCIATED WITH TYPE 2 DIABETES MELLITUS: Primary | ICD-10-CM

## 2022-09-16 RX ORDER — EZETIMIBE 10 MG/1
10 TABLET ORAL DAILY
Qty: 90 TABLET | Refills: 3 | Status: SHIPPED | OUTPATIENT
Start: 2022-09-16 | End: 2023-09-19 | Stop reason: SDUPTHER

## 2022-09-16 NOTE — PROGRESS NOTES
D/w Dr. Gray and will start zetia 10 mg daily to help get her LDL to goal <70.  Cmp/lipids prior to visit with Dr. Gray in 3 months

## 2022-09-16 NOTE — TELEPHONE ENCOUNTER
----- Message from Hallie Cruz NP sent at 9/16/2022 11:02 AM CDT -----  Please let her know that her bad cholesterol, LDL, is stubborn and still too high.  I d/w Dr. Gray and we'd like to start zetia 10 mg (ezetamibe).  I sent the prescription to [facility].  Please have her get the cmp/lipids right before she sees Dr. Gray. Thanks!

## 2022-09-19 ENCOUNTER — TELEPHONE (OUTPATIENT)
Dept: INTERNAL MEDICINE | Facility: CLINIC | Age: 82
End: 2022-09-19
Payer: MEDICARE

## 2022-09-19 ENCOUNTER — TELEPHONE (OUTPATIENT)
Dept: CARDIOLOGY | Facility: CLINIC | Age: 82
End: 2022-09-19

## 2022-09-19 DIAGNOSIS — E78.5 HYPERLIPIDEMIA ASSOCIATED WITH TYPE 2 DIABETES MELLITUS: Primary | ICD-10-CM

## 2022-09-19 DIAGNOSIS — E11.69 HYPERLIPIDEMIA ASSOCIATED WITH TYPE 2 DIABETES MELLITUS: Primary | ICD-10-CM

## 2022-09-19 DIAGNOSIS — E11.59 HYPERTENSION ASSOCIATED WITH DIABETES: ICD-10-CM

## 2022-09-19 DIAGNOSIS — I15.2 HYPERTENSION ASSOCIATED WITH DIABETES: ICD-10-CM

## 2022-09-19 NOTE — TELEPHONE ENCOUNTER
----- Message from Karina Wilson MD sent at 9/15/2022  2:25 PM CDT -----  Your hemoglobin A1c level has resulted, and it has improved.  If you are tolerating the metformin 500 mg twice daily please continue to take it as directed.  If your blood sugar continues to decline, in the future we may be able to decrease you to 1 tablet daily.  Please do not hesitate to call/email if you have any questions or concerns   Karina Cabrales

## 2022-11-09 ENCOUNTER — PATIENT OUTREACH (OUTPATIENT)
Dept: ADMINISTRATIVE | Facility: HOSPITAL | Age: 82
End: 2022-11-09
Payer: MEDICARE

## 2022-12-14 ENCOUNTER — LAB VISIT (OUTPATIENT)
Dept: LAB | Facility: HOSPITAL | Age: 82
End: 2022-12-14
Attending: INTERNAL MEDICINE
Payer: MEDICARE

## 2022-12-14 DIAGNOSIS — E11.59 HYPERTENSION ASSOCIATED WITH DIABETES: ICD-10-CM

## 2022-12-14 DIAGNOSIS — I15.2 HYPERTENSION ASSOCIATED WITH DIABETES: ICD-10-CM

## 2022-12-14 DIAGNOSIS — E11.69 HYPERLIPIDEMIA ASSOCIATED WITH TYPE 2 DIABETES MELLITUS: ICD-10-CM

## 2022-12-14 DIAGNOSIS — E78.5 HYPERLIPIDEMIA ASSOCIATED WITH TYPE 2 DIABETES MELLITUS: ICD-10-CM

## 2022-12-14 LAB
ALBUMIN SERPL BCP-MCNC: 3.9 G/DL (ref 3.5–5.2)
ALP SERPL-CCNC: 39 U/L (ref 55–135)
ALT SERPL W/O P-5'-P-CCNC: 19 U/L (ref 10–44)
ANION GAP SERPL CALC-SCNC: 10 MMOL/L (ref 8–16)
AST SERPL-CCNC: 19 U/L (ref 10–40)
BILIRUB SERPL-MCNC: 0.4 MG/DL (ref 0.1–1)
BUN SERPL-MCNC: 25 MG/DL (ref 8–23)
CALCIUM SERPL-MCNC: 9.8 MG/DL (ref 8.7–10.5)
CHLORIDE SERPL-SCNC: 102 MMOL/L (ref 95–110)
CHOLEST SERPL-MCNC: 137 MG/DL (ref 120–199)
CHOLEST/HDLC SERPL: 3.1 {RATIO} (ref 2–5)
CO2 SERPL-SCNC: 26 MMOL/L (ref 23–29)
CREAT SERPL-MCNC: 1.1 MG/DL (ref 0.5–1.4)
EST. GFR  (NO RACE VARIABLE): 50.5 ML/MIN/1.73 M^2
GLUCOSE SERPL-MCNC: 125 MG/DL (ref 70–110)
HDLC SERPL-MCNC: 44 MG/DL (ref 40–75)
HDLC SERPL: 32.1 % (ref 20–50)
LDLC SERPL CALC-MCNC: 63.6 MG/DL (ref 63–159)
NONHDLC SERPL-MCNC: 93 MG/DL
POTASSIUM SERPL-SCNC: 3.7 MMOL/L (ref 3.5–5.1)
PROT SERPL-MCNC: 6.6 G/DL (ref 6–8.4)
SODIUM SERPL-SCNC: 138 MMOL/L (ref 136–145)
T4 FREE SERPL-MCNC: 0.84 NG/DL (ref 0.71–1.51)
TRIGL SERPL-MCNC: 147 MG/DL (ref 30–150)
TSH SERPL DL<=0.005 MIU/L-ACNC: 4.46 UIU/ML (ref 0.4–4)

## 2022-12-14 PROCEDURE — 80061 LIPID PANEL: CPT | Performed by: INTERNAL MEDICINE

## 2022-12-14 PROCEDURE — 84443 ASSAY THYROID STIM HORMONE: CPT | Performed by: INTERNAL MEDICINE

## 2022-12-14 PROCEDURE — 36415 COLL VENOUS BLD VENIPUNCTURE: CPT | Mod: PO | Performed by: INTERNAL MEDICINE

## 2022-12-14 PROCEDURE — 84439 ASSAY OF FREE THYROXINE: CPT | Performed by: INTERNAL MEDICINE

## 2022-12-14 PROCEDURE — 80053 COMPREHEN METABOLIC PANEL: CPT | Performed by: INTERNAL MEDICINE

## 2022-12-21 ENCOUNTER — OFFICE VISIT (OUTPATIENT)
Dept: CARDIOLOGY | Facility: CLINIC | Age: 82
End: 2022-12-21
Payer: MEDICARE

## 2022-12-21 VITALS
DIASTOLIC BLOOD PRESSURE: 70 MMHG | HEIGHT: 66 IN | WEIGHT: 172.63 LBS | HEART RATE: 65 BPM | BODY MASS INDEX: 27.74 KG/M2 | SYSTOLIC BLOOD PRESSURE: 169 MMHG

## 2022-12-21 DIAGNOSIS — E66.9 OBESITY, DIABETES, AND HYPERTENSION SYNDROME: ICD-10-CM

## 2022-12-21 DIAGNOSIS — I35.0 NONRHEUMATIC AORTIC VALVE STENOSIS: ICD-10-CM

## 2022-12-21 DIAGNOSIS — I15.2 HYPERTENSION ASSOCIATED WITH DIABETES: ICD-10-CM

## 2022-12-21 DIAGNOSIS — E04.2 NONTOXIC MULTINODULAR GOITER: ICD-10-CM

## 2022-12-21 DIAGNOSIS — E78.5 HYPERLIPIDEMIA ASSOCIATED WITH TYPE 2 DIABETES MELLITUS: ICD-10-CM

## 2022-12-21 DIAGNOSIS — I65.23 BILATERAL CAROTID ARTERY STENOSIS: ICD-10-CM

## 2022-12-21 DIAGNOSIS — Z95.2 S/P AVR (AORTIC VALVE REPLACEMENT): ICD-10-CM

## 2022-12-21 DIAGNOSIS — E11.59 HYPERTENSION ASSOCIATED WITH DIABETES: ICD-10-CM

## 2022-12-21 DIAGNOSIS — E11.59 OBESITY, DIABETES, AND HYPERTENSION SYNDROME: ICD-10-CM

## 2022-12-21 DIAGNOSIS — I70.0 AORTIC ATHEROSCLEROSIS: Primary | ICD-10-CM

## 2022-12-21 DIAGNOSIS — Z86.73 HISTORY OF CVA (CEREBROVASCULAR ACCIDENT): ICD-10-CM

## 2022-12-21 DIAGNOSIS — N18.30 CKD STAGE 3 SECONDARY TO DIABETES: ICD-10-CM

## 2022-12-21 DIAGNOSIS — I51.89 LEFT VENTRICULAR DIASTOLIC DYSFUNCTION WITH PRESERVED SYSTOLIC FUNCTION: ICD-10-CM

## 2022-12-21 DIAGNOSIS — E11.22 CKD STAGE 3 SECONDARY TO DIABETES: ICD-10-CM

## 2022-12-21 DIAGNOSIS — I15.2 OBESITY, DIABETES, AND HYPERTENSION SYNDROME: ICD-10-CM

## 2022-12-21 DIAGNOSIS — E11.69 HYPERLIPIDEMIA ASSOCIATED WITH TYPE 2 DIABETES MELLITUS: ICD-10-CM

## 2022-12-21 DIAGNOSIS — E11.69 OBESITY, DIABETES, AND HYPERTENSION SYNDROME: ICD-10-CM

## 2022-12-21 PROCEDURE — 1126F PR PAIN SEVERITY QUANTIFIED, NO PAIN PRESENT: ICD-10-PCS | Mod: CPTII,S$GLB,, | Performed by: INTERNAL MEDICINE

## 2022-12-21 PROCEDURE — 3078F DIAST BP <80 MM HG: CPT | Mod: CPTII,S$GLB,, | Performed by: INTERNAL MEDICINE

## 2022-12-21 PROCEDURE — 99999 PR PBB SHADOW E&M-EST. PATIENT-LVL III: CPT | Mod: PBBFAC,,, | Performed by: INTERNAL MEDICINE

## 2022-12-21 PROCEDURE — 1101F PR PT FALLS ASSESS DOC 0-1 FALLS W/OUT INJ PAST YR: ICD-10-PCS | Mod: CPTII,S$GLB,, | Performed by: INTERNAL MEDICINE

## 2022-12-21 PROCEDURE — 1159F MED LIST DOCD IN RCRD: CPT | Mod: CPTII,S$GLB,, | Performed by: INTERNAL MEDICINE

## 2022-12-21 PROCEDURE — 3077F PR MOST RECENT SYSTOLIC BLOOD PRESSURE >= 140 MM HG: ICD-10-PCS | Mod: CPTII,S$GLB,, | Performed by: INTERNAL MEDICINE

## 2022-12-21 PROCEDURE — 1126F AMNT PAIN NOTED NONE PRSNT: CPT | Mod: CPTII,S$GLB,, | Performed by: INTERNAL MEDICINE

## 2022-12-21 PROCEDURE — 3288F PR FALLS RISK ASSESSMENT DOCUMENTED: ICD-10-PCS | Mod: CPTII,S$GLB,, | Performed by: INTERNAL MEDICINE

## 2022-12-21 PROCEDURE — 1101F PT FALLS ASSESS-DOCD LE1/YR: CPT | Mod: CPTII,S$GLB,, | Performed by: INTERNAL MEDICINE

## 2022-12-21 PROCEDURE — 99499 RISK ADDL DX/OHS AUDIT: ICD-10-PCS | Mod: HCNC,S$GLB,, | Performed by: INTERNAL MEDICINE

## 2022-12-21 PROCEDURE — 1160F RVW MEDS BY RX/DR IN RCRD: CPT | Mod: CPTII,S$GLB,, | Performed by: INTERNAL MEDICINE

## 2022-12-21 PROCEDURE — 99499 UNLISTED E&M SERVICE: CPT | Mod: HCNC,S$GLB,, | Performed by: INTERNAL MEDICINE

## 2022-12-21 PROCEDURE — 3078F PR MOST RECENT DIASTOLIC BLOOD PRESSURE < 80 MM HG: ICD-10-PCS | Mod: CPTII,S$GLB,, | Performed by: INTERNAL MEDICINE

## 2022-12-21 PROCEDURE — 3288F FALL RISK ASSESSMENT DOCD: CPT | Mod: CPTII,S$GLB,, | Performed by: INTERNAL MEDICINE

## 2022-12-21 PROCEDURE — 99214 PR OFFICE/OUTPT VISIT, EST, LEVL IV, 30-39 MIN: ICD-10-PCS | Mod: S$GLB,,, | Performed by: INTERNAL MEDICINE

## 2022-12-21 PROCEDURE — 3077F SYST BP >= 140 MM HG: CPT | Mod: CPTII,S$GLB,, | Performed by: INTERNAL MEDICINE

## 2022-12-21 PROCEDURE — 99999 PR PBB SHADOW E&M-EST. PATIENT-LVL III: ICD-10-PCS | Mod: PBBFAC,,, | Performed by: INTERNAL MEDICINE

## 2022-12-21 PROCEDURE — 1160F PR REVIEW ALL MEDS BY PRESCRIBER/CLIN PHARMACIST DOCUMENTED: ICD-10-PCS | Mod: CPTII,S$GLB,, | Performed by: INTERNAL MEDICINE

## 2022-12-21 PROCEDURE — 1159F PR MEDICATION LIST DOCUMENTED IN MEDICAL RECORD: ICD-10-PCS | Mod: CPTII,S$GLB,, | Performed by: INTERNAL MEDICINE

## 2022-12-21 PROCEDURE — 99214 OFFICE O/P EST MOD 30 MIN: CPT | Mod: S$GLB,,, | Performed by: INTERNAL MEDICINE

## 2022-12-21 NOTE — PROGRESS NOTES
Subjective:   Patient ID:  Zaida Liu is a 82 y.o. female who presents for follow-up of Hypertension associated with diabetes and Follow-up      HPI: In August patient was hospitalized with CVA. She follows with neurology on a [facility] and is scheduled for a follow up visit next month. 30 day monitor was mailed to her, but she did not start monitoring yet. She lost her  of 45 years to Parkinson disease last summer and is quite distraught. Sh has no cardiac complaints    BP is not at goal today.    Lab Results   Component Value Date     12/14/2022    K 3.7 12/14/2022     12/14/2022    CO2 26 12/14/2022    BUN 25 (H) 12/14/2022    CREATININE 1.1 12/14/2022     (H) 12/14/2022    HGBA1C 7.1 (H) 09/13/2022    MG 1.5 (L) 12/15/2015    AST 19 12/14/2022    ALT 19 12/14/2022    ALBUMIN 3.9 12/14/2022    PROT 6.6 12/14/2022    BILITOT 0.4 12/14/2022    WBC 9.56 03/07/2022    HGB 13.6 03/07/2022    HCT 41.2 03/07/2022    HCT 28 (L) 08/25/2015    MCV 91 03/07/2022     03/07/2022    INR 1.0 12/15/2015    TSH 4.460 (H) 12/14/2022    CHOL 137 12/14/2022    HDL 44 12/14/2022    LDLCALC 63.6 12/14/2022    TRIG 147 12/14/2022       No results found in the last 24 hours.     Review of Systems   Constitutional: Negative.   HENT: Negative.     Eyes: Negative.    Cardiovascular:  Positive for leg swelling. Negative for chest pain, claudication, dyspnea on exertion, irregular heartbeat, near-syncope, palpitations and syncope.   Respiratory: Negative.  Negative for cough, shortness of breath, snoring and wheezing.    Endocrine: Negative.  Negative for cold intolerance, heat intolerance, polydipsia, polyphagia and polyuria.   Skin: Negative.    Musculoskeletal:  Positive for arthritis and back pain.   Gastrointestinal: Negative.    Genitourinary: Negative.    Neurological: Negative.    Psychiatric/Behavioral: Negative.       Objective:   Physical Exam  Vitals and nursing note reviewed.   Constitutional:  "      Appearance: Normal appearance. She is well-developed.      Comments: BP (!) 169/70   Pulse 65   Ht 5' 6" (1.676 m)   Wt 78.3 kg (172 lb 9.9 oz)   BMI 27.86 kg/m²      HENT:      Head: Normocephalic.   Eyes:      Pupils: Pupils are equal, round, and reactive to light.   Neck:      Thyroid: No thyromegaly.      Vascular: No carotid bruit.   Cardiovascular:      Rate and Rhythm: Normal rate and regular rhythm.      Pulses: Intact distal pulses.           Carotid pulses are 2+ on the right side and 2+ on the left side.       Radial pulses are 2+ on the right side and 2+ on the left side.        Femoral pulses are 2+ on the right side and 2+ on the left side.       Popliteal pulses are 2+ on the right side and 2+ on the left side.        Dorsalis pedis pulses are 2+ on the right side and 2+ on the left side.        Posterior tibial pulses are 2+ on the right side and 2+ on the left side.      Heart sounds: Normal heart sounds. No murmur heard.    No friction rub. No gallop.   Pulmonary:      Effort: Pulmonary effort is normal. No respiratory distress.      Breath sounds: Normal breath sounds. No wheezing or rales.   Chest:      Chest wall: No tenderness.   Abdominal:      Palpations: Abdomen is soft.   Musculoskeletal:         General: Normal range of motion.      Cervical back: Normal range of motion and neck supple.   Skin:     General: Skin is warm and dry.   Neurological:      Mental Status: She is alert and oriented to person, place, and time.         Assessment and Plan:     Problem List Items Addressed This Visit          Cardiology Problems    Hyperlipidemia associated with type 2 diabetes mellitus    Aortic stenosis    Relevant Orders    Echo    Hypertension associated with diabetes    Relevant Orders    Echo    Left ventricular diastolic dysfunction with preserved systolic function    Relevant Orders    Echo    Aortic atherosclerosis - Primary    Relevant Orders    Echo    Bilateral carotid artery " stenosis    Obesity, diabetes, and hypertension syndrome       Other    Nontoxic multinodular goiter    S/P AVR (aortic valve replacement)    Relevant Orders    Echo    CKD stage 3 secondary to diabetes    History of CVA (cerebrovascular accident)    Relevant Orders    Echo       Patient's Medications   New Prescriptions    No medications on file   Previous Medications    AMLODIPINE (NORVASC) 10 MG TABLET    TAKE 1 TABLET EVERY DAY    ASPIRIN (ECOTRIN) 81 MG EC TABLET    Take 81 mg by mouth once daily.    CLOPIDOGREL (PLAVIX) 75 MG TABLET    Take 1 tablet (75 mg total) by mouth once daily.    EZETIMIBE (ZETIA) 10 MG TABLET    Take 1 tablet (10 mg total) by mouth once daily.    FENOFIBRATE MICRONIZED (LOFIBRA) 134 MG CAP    Take 1 capsule (134 mg total) by mouth daily with breakfast.    HYDROCHLOROTHIAZIDE (HYDRODIURIL) 25 MG TABLET    Take 1 tablet (25 mg total) by mouth once daily.    LOSARTAN (COZAAR) 100 MG TABLET    Take 1 tablet (100 mg total) by mouth once daily.    METFORMIN (GLUCOPHAGE) 500 MG TABLET    Take 1 tablet (500 mg total) by mouth 2 (two) times daily with meals.    METOPROLOL TARTRATE (LOPRESSOR) 25 MG TABLET    TAKE 1 TABLET TWICE DAILY    OMEPRAZOLE (PRILOSEC) 20 MG CAPSULE    Take 20 mg by mouth every morning.    ROSUVASTATIN (CRESTOR) 40 MG TAB    Take 1 tablet (40 mg total) by mouth every evening.    VITAMIN D 1000 UNITS TAB    Take 1,000 Units by mouth once daily.   Modified Medications    No medications on file   Discontinued Medications    No medications on file     Would consider adding SGLT2i t her regimen. It would aid not only in controlling her diabetes, but it her cardiovascular benefits and her hypertension might  be better controlled. Would start Jardiance 10 mg daily and monitor BMP in 1-2 weeks. Defer to Dr. Cabrales.    Encouraged to start 30-day event monitoring.    Length of post CVA DAPT to be addressed by her neurologist. I think there is no added benefit after 90-day  duration of DAPT and risk of hemorrhagic complications is increased. If one is chosen I would recommend Plavix over ASA. Patient will discuss it with neurology.    Repeat CFD and advise.    Follow up in about 6 months (around 6/21/2023).

## 2022-12-29 ENCOUNTER — HOSPITAL ENCOUNTER (OUTPATIENT)
Dept: CARDIOLOGY | Facility: HOSPITAL | Age: 82
Discharge: HOME | End: 2022-12-29
Attending: INTERNAL MEDICINE
Payer: MEDICARE

## 2022-12-29 VITALS — WEIGHT: 172 LBS | BODY MASS INDEX: 27.64 KG/M2 | HEIGHT: 66 IN

## 2022-12-29 DIAGNOSIS — Z95.2 S/P AVR (AORTIC VALVE REPLACEMENT): ICD-10-CM

## 2022-12-29 DIAGNOSIS — I51.89 LEFT VENTRICULAR DIASTOLIC DYSFUNCTION WITH PRESERVED SYSTOLIC FUNCTION: ICD-10-CM

## 2022-12-29 DIAGNOSIS — I70.0 AORTIC ATHEROSCLEROSIS: ICD-10-CM

## 2022-12-29 DIAGNOSIS — I35.0 NONRHEUMATIC AORTIC VALVE STENOSIS: ICD-10-CM

## 2022-12-29 DIAGNOSIS — I15.2 HYPERTENSION ASSOCIATED WITH DIABETES: ICD-10-CM

## 2022-12-29 DIAGNOSIS — E11.59 HYPERTENSION ASSOCIATED WITH DIABETES: ICD-10-CM

## 2022-12-29 DIAGNOSIS — Z86.73 HISTORY OF CVA (CEREBROVASCULAR ACCIDENT): ICD-10-CM

## 2022-12-29 LAB
AV INDEX (PROSTH): 0.54
AV MEAN GRADIENT: 10 MMHG
AV PEAK GRADIENT: 18 MMHG
AV VALVE AREA: 1.73 CM2
AV VELOCITY RATIO: 0.5
BSA FOR ECHO PROCEDURE: 1.91 M2
CV ECHO LV RWT: 0.37 CM
DOP CALC AO PEAK VEL: 2.1 M/S
DOP CALC AO VTI: 50 CM
DOP CALC LVOT AREA: 3.2 CM2
DOP CALC LVOT DIAMETER: 2.02 CM
DOP CALC LVOT PEAK VEL: 1.05 M/S
DOP CALC LVOT STROKE VOLUME: 86.48 CM3
DOP CALCLVOT PEAK VEL VTI: 27 CM
E WAVE DECELERATION TIME: 263.64 MSEC
E/A RATIO: 0.63
E/E' RATIO: 14.18 M/S
ECHO LV POSTERIOR WALL: 0.86 CM (ref 0.6–1.1)
EJECTION FRACTION: 65 %
FRACTIONAL SHORTENING: 33 % (ref 28–44)
INTERVENTRICULAR SEPTUM: 1.07 CM (ref 0.6–1.1)
IVC DIAMETER: 1.3 CM
IVRT: 128.45 MSEC
LA MAJOR: 5.1 CM
LA MINOR: 5.1 CM
LA WIDTH: 4.2 CM
LEFT ATRIUM SIZE: 3.81 CM
LEFT ATRIUM VOLUME INDEX: 36.9 ML/M2
LEFT ATRIUM VOLUME: 69.37 CM3
LEFT INTERNAL DIMENSION IN SYSTOLE: 3.13 CM (ref 2.1–4)
LEFT VENTRICLE DIASTOLIC VOLUME INDEX: 53.32 ML/M2
LEFT VENTRICLE DIASTOLIC VOLUME: 100.24 ML
LEFT VENTRICLE MASS INDEX: 82 G/M2
LEFT VENTRICLE SYSTOLIC VOLUME INDEX: 20.7 ML/M2
LEFT VENTRICLE SYSTOLIC VOLUME: 38.89 ML
LEFT VENTRICULAR INTERNAL DIMENSION IN DIASTOLE: 4.66 CM (ref 3.5–6)
LEFT VENTRICULAR MASS: 154.52 G
LV LATERAL E/E' RATIO: 13 M/S
LV SEPTAL E/E' RATIO: 15.6 M/S
LVOT MG: 2.44 MMHG
LVOT MV: 0.75 CM/S
MV PEAK A VEL: 1.23 M/S
MV PEAK E VEL: 0.78 M/S
MV STENOSIS PRESSURE HALF TIME: 76.45 MS
MV VALVE AREA P 1/2 METHOD: 2.88 CM2
PISA TR MAX VEL: 2.54 M/S
PULM VEIN S/D RATIO: 1.94
PV PEAK D VEL: 0.35 M/S
PV PEAK S VEL: 0.68 M/S
PV PEAK VELOCITY: 2.06 CM/S
RA MAJOR: 4.2 CM
RA PRESSURE: 3 MMHG
RA WIDTH: 3.08 CM
RIGHT VENTRICULAR END-DIASTOLIC DIMENSION: 2.51 CM
TDI LATERAL: 0.06 M/S
TDI SEPTAL: 0.05 M/S
TDI: 0.06 M/S
TR MAX PG: 26 MMHG
TRICUSPID ANNULAR PLANE SYSTOLIC EXCURSION: 1.85 CM
TV PEAK GRADIENT: 2.26 MMHG
TV REST PULMONARY ARTERY PRESSURE: 29 MMHG

## 2022-12-29 PROCEDURE — 93306 ECHO (CUPID ONLY): ICD-10-PCS | Mod: 26,,, | Performed by: INTERNAL MEDICINE

## 2022-12-29 PROCEDURE — 93306 TTE W/DOPPLER COMPLETE: CPT | Mod: 26,,, | Performed by: INTERNAL MEDICINE

## 2022-12-29 PROCEDURE — 93306 TTE W/DOPPLER COMPLETE: CPT

## 2023-01-02 ENCOUNTER — PATIENT MESSAGE (OUTPATIENT)
Dept: CARDIOLOGY | Facility: CLINIC | Age: 83
End: 2023-01-02
Payer: MEDICARE

## 2023-01-02 NOTE — PROGRESS NOTES
Please inform the patient that her cardiac echo showed normal heart function and normally functioning aortic prosthesis.  Good news.

## 2023-01-03 ENCOUNTER — TELEPHONE (OUTPATIENT)
Dept: CARDIOLOGY | Facility: CLINIC | Age: 83
End: 2023-01-03
Payer: MEDICARE

## 2023-01-03 NOTE — TELEPHONE ENCOUNTER
----- Message from Cristela Gray MD sent at 1/2/2023  6:06 AM CST -----  Please inform the patient that her cardiac echo showed normal heart function and normally functioning aortic prosthesis.  Good news.

## 2023-01-19 ENCOUNTER — TELEPHONE (OUTPATIENT)
Dept: CARDIOLOGY | Facility: CLINIC | Age: 83
End: 2023-01-19
Payer: MEDICARE

## 2023-01-19 DIAGNOSIS — I65.23 BILATERAL CAROTID ARTERY STENOSIS: ICD-10-CM

## 2023-01-19 DIAGNOSIS — G45.9 TRANSIENT CEREBRAL ISCHEMIA, UNSPECIFIED TYPE: ICD-10-CM

## 2023-01-19 DIAGNOSIS — E04.2 NONTOXIC MULTINODULAR GOITER: ICD-10-CM

## 2023-01-19 DIAGNOSIS — Z86.73 HISTORY OF CVA (CEREBROVASCULAR ACCIDENT): ICD-10-CM

## 2023-01-19 DIAGNOSIS — I63.9 CRYPTOGENIC STROKE: Primary | ICD-10-CM

## 2023-01-19 NOTE — TELEPHONE ENCOUNTER
----- Message from Diane Lowe sent at 2023  2:17 PM CST -----  Regarding: event monitor  Pls call pt's Son Alphonso [phone].  Back in  Hallie Cruz ordered an Event Monitor that was mailed to the pt and at the time she was unable to wear it and tried setting it up a few days ago and it showed an error which requires a new order to be placed b/c the old order has .    Thank you

## 2023-01-19 NOTE — TELEPHONE ENCOUNTER
----- Message from Saritha George MA sent at 1/3/2023  4:54 PM CST -----  Please inform the patient that her cardiac echo showed normal heart function and normally functioning aortic prosthesis.  Good news.

## 2023-01-19 NOTE — TELEPHONE ENCOUNTER
Can you place another order for event monitor for patient. The other one has  and she's finally trying to wear it.      Thanks  Saritha

## 2023-01-30 ENCOUNTER — CLINICAL SUPPORT (OUTPATIENT)
Dept: CARDIOLOGY | Facility: HOSPITAL | Age: 83
End: 2023-01-30
Attending: INTERNAL MEDICINE
Payer: MEDICARE

## 2023-01-30 DIAGNOSIS — I65.23 BILATERAL CAROTID ARTERY STENOSIS: ICD-10-CM

## 2023-01-30 DIAGNOSIS — I63.9 CRYPTOGENIC STROKE: ICD-10-CM

## 2023-01-30 DIAGNOSIS — Z86.73 HISTORY OF CVA (CEREBROVASCULAR ACCIDENT): ICD-10-CM

## 2023-01-30 DIAGNOSIS — E04.2 NONTOXIC MULTINODULAR GOITER: ICD-10-CM

## 2023-01-30 DIAGNOSIS — G45.9 TRANSIENT CEREBRAL ISCHEMIA, UNSPECIFIED TYPE: ICD-10-CM

## 2023-01-30 PROCEDURE — 93272 CARDIAC EVENT MONITOR (CUPID ONLY): ICD-10-PCS | Mod: HCNC,,, | Performed by: STUDENT IN AN ORGANIZED HEALTH CARE EDUCATION/TRAINING PROGRAM

## 2023-01-30 PROCEDURE — 93270 REMOTE 30 DAY ECG REV/REPORT: CPT | Mod: HCNC

## 2023-01-30 PROCEDURE — 93272 ECG/REVIEW INTERPRET ONLY: CPT | Mod: HCNC,,, | Performed by: STUDENT IN AN ORGANIZED HEALTH CARE EDUCATION/TRAINING PROGRAM

## 2023-02-07 ENCOUNTER — PES CALL (OUTPATIENT)
Dept: ADMINISTRATIVE | Facility: CLINIC | Age: 83
End: 2023-02-07
Payer: MEDICARE

## 2023-02-07 DIAGNOSIS — Z00.00 ENCOUNTER FOR MEDICARE ANNUAL WELLNESS EXAM: ICD-10-CM

## 2023-02-09 DIAGNOSIS — Z00.00 ENCOUNTER FOR MEDICARE ANNUAL WELLNESS EXAM: ICD-10-CM

## 2023-02-10 ENCOUNTER — PATIENT OUTREACH (OUTPATIENT)
Dept: ADMINISTRATIVE | Facility: HOSPITAL | Age: 83
End: 2023-02-10
Payer: MEDICARE

## 2023-03-01 ENCOUNTER — LAB VISIT (OUTPATIENT)
Dept: LAB | Facility: HOSPITAL | Age: 83
End: 2023-03-01
Payer: MEDICARE

## 2023-03-01 DIAGNOSIS — I15.2 HYPERTENSION ASSOCIATED WITH DIABETES: ICD-10-CM

## 2023-03-01 DIAGNOSIS — E78.5 HYPERLIPIDEMIA ASSOCIATED WITH TYPE 2 DIABETES MELLITUS: ICD-10-CM

## 2023-03-01 DIAGNOSIS — R80.9 TYPE 2 DIABETES MELLITUS WITH MICROALBUMINURIA, WITHOUT LONG-TERM CURRENT USE OF INSULIN: ICD-10-CM

## 2023-03-01 DIAGNOSIS — E53.8 B12 DEFICIENCY: ICD-10-CM

## 2023-03-01 DIAGNOSIS — E11.69 HYPERLIPIDEMIA ASSOCIATED WITH TYPE 2 DIABETES MELLITUS: ICD-10-CM

## 2023-03-01 DIAGNOSIS — E11.59 HYPERTENSION ASSOCIATED WITH DIABETES: ICD-10-CM

## 2023-03-01 DIAGNOSIS — E11.29 TYPE 2 DIABETES MELLITUS WITH MICROALBUMINURIA, WITHOUT LONG-TERM CURRENT USE OF INSULIN: ICD-10-CM

## 2023-03-01 LAB
ALBUMIN SERPL BCP-MCNC: 3.7 G/DL (ref 3.5–5.2)
ALP SERPL-CCNC: 35 U/L (ref 55–135)
ALT SERPL W/O P-5'-P-CCNC: 16 U/L (ref 10–44)
ANION GAP SERPL CALC-SCNC: 7 MMOL/L (ref 8–16)
AST SERPL-CCNC: 17 U/L (ref 10–40)
BASOPHILS # BLD AUTO: 0.07 K/UL (ref 0–0.2)
BASOPHILS NFR BLD: 0.9 % (ref 0–1.9)
BILIRUB SERPL-MCNC: 0.4 MG/DL (ref 0.1–1)
BUN SERPL-MCNC: 14 MG/DL (ref 8–23)
CALCIUM SERPL-MCNC: 9.2 MG/DL (ref 8.7–10.5)
CHLORIDE SERPL-SCNC: 106 MMOL/L (ref 95–110)
CHOLEST SERPL-MCNC: 135 MG/DL (ref 120–199)
CHOLEST/HDLC SERPL: 2.9 {RATIO} (ref 2–5)
CO2 SERPL-SCNC: 29 MMOL/L (ref 23–29)
CREAT SERPL-MCNC: 1 MG/DL (ref 0.5–1.4)
DIFFERENTIAL METHOD: ABNORMAL
EOSINOPHIL # BLD AUTO: 0.2 K/UL (ref 0–0.5)
EOSINOPHIL NFR BLD: 2.2 % (ref 0–8)
ERYTHROCYTE [DISTWIDTH] IN BLOOD BY AUTOMATED COUNT: 12.8 % (ref 11.5–14.5)
EST. GFR  (NO RACE VARIABLE): 56.2 ML/MIN/1.73 M^2
ESTIMATED AVG GLUCOSE: 148 MG/DL (ref 68–131)
GLUCOSE SERPL-MCNC: 113 MG/DL (ref 70–110)
HBA1C MFR BLD: 6.8 % (ref 4–5.6)
HCT VFR BLD AUTO: 34.7 % (ref 37–48.5)
HDLC SERPL-MCNC: 47 MG/DL (ref 40–75)
HDLC SERPL: 34.8 % (ref 20–50)
HGB BLD-MCNC: 11.2 G/DL (ref 12–16)
IMM GRANULOCYTES # BLD AUTO: 0.02 K/UL (ref 0–0.04)
IMM GRANULOCYTES NFR BLD AUTO: 0.3 % (ref 0–0.5)
LDLC SERPL CALC-MCNC: 63.8 MG/DL (ref 63–159)
LYMPHOCYTES # BLD AUTO: 2.3 K/UL (ref 1–4.8)
LYMPHOCYTES NFR BLD: 30.9 % (ref 18–48)
MCH RBC QN AUTO: 29.9 PG (ref 27–31)
MCHC RBC AUTO-ENTMCNC: 32.3 G/DL (ref 32–36)
MCV RBC AUTO: 93 FL (ref 82–98)
MONOCYTES # BLD AUTO: 0.6 K/UL (ref 0.3–1)
MONOCYTES NFR BLD: 8.3 % (ref 4–15)
NEUTROPHILS # BLD AUTO: 4.4 K/UL (ref 1.8–7.7)
NEUTROPHILS NFR BLD: 57.4 % (ref 38–73)
NONHDLC SERPL-MCNC: 88 MG/DL
NRBC BLD-RTO: 0 /100 WBC
PLATELET # BLD AUTO: 246 K/UL (ref 150–450)
PMV BLD AUTO: 11.3 FL (ref 9.2–12.9)
POTASSIUM SERPL-SCNC: 3.8 MMOL/L (ref 3.5–5.1)
PROT SERPL-MCNC: 6.1 G/DL (ref 6–8.4)
RBC # BLD AUTO: 3.74 M/UL (ref 4–5.4)
SODIUM SERPL-SCNC: 142 MMOL/L (ref 136–145)
TRIGL SERPL-MCNC: 121 MG/DL (ref 30–150)
WBC # BLD AUTO: 7.58 K/UL (ref 3.9–12.7)

## 2023-03-01 PROCEDURE — 80061 LIPID PANEL: CPT | Mod: HCNC | Performed by: INTERNAL MEDICINE

## 2023-03-01 PROCEDURE — 84443 ASSAY THYROID STIM HORMONE: CPT | Mod: HCNC | Performed by: INTERNAL MEDICINE

## 2023-03-01 PROCEDURE — 36415 COLL VENOUS BLD VENIPUNCTURE: CPT | Mod: HCNC,PO | Performed by: INTERNAL MEDICINE

## 2023-03-01 PROCEDURE — 82607 VITAMIN B-12: CPT | Mod: HCNC | Performed by: INTERNAL MEDICINE

## 2023-03-01 PROCEDURE — 83036 HEMOGLOBIN GLYCOSYLATED A1C: CPT | Mod: HCNC | Performed by: INTERNAL MEDICINE

## 2023-03-01 PROCEDURE — 80053 COMPREHEN METABOLIC PANEL: CPT | Mod: HCNC | Performed by: INTERNAL MEDICINE

## 2023-03-01 PROCEDURE — 85025 COMPLETE CBC W/AUTO DIFF WBC: CPT | Mod: HCNC | Performed by: INTERNAL MEDICINE

## 2023-03-02 ENCOUNTER — PATIENT MESSAGE (OUTPATIENT)
Dept: INTERNAL MEDICINE | Facility: CLINIC | Age: 83
End: 2023-03-02
Payer: MEDICARE

## 2023-03-02 DIAGNOSIS — D50.9 IRON DEFICIENCY ANEMIA, UNSPECIFIED IRON DEFICIENCY ANEMIA TYPE: Primary | ICD-10-CM

## 2023-03-02 LAB
TSH SERPL DL<=0.005 MIU/L-ACNC: 3.73 UIU/ML (ref 0.4–4)
VIT B12 SERPL-MCNC: 442 PG/ML (ref 210–950)

## 2023-03-02 NOTE — TELEPHONE ENCOUNTER
Pt's lab was drawn yesterday and already discarded. Spoke with pt's son and he will bring pt back in to have iron study redrawn within the next week.

## 2023-03-02 NOTE — TELEPHONE ENCOUNTER
Patient's lab today revealed a new anemia, iron studies were ordered please call client services and have them add the iron studies to today's lab before they toss the sample

## 2023-03-03 ENCOUNTER — TELEPHONE (OUTPATIENT)
Dept: INTERNAL MEDICINE | Facility: CLINIC | Age: 83
End: 2023-03-03
Payer: MEDICARE

## 2023-03-03 NOTE — TELEPHONE ENCOUNTER
----- Message from Karina Wilson MD sent at 3/3/2023  9:33 AM CST -----  Please review your lab work and we will discuss at your pending office visit.   And we can plan to check her iron levels at that visit.  valentín

## 2023-03-05 NOTE — PROGRESS NOTES
CC:    83-year-old female with diabetes, hypertension, hyperlipidemia, CKD 3,  Left ventricular diastolic dysfunction with preserved systolic function and aortic stenosis status post valve replacement and recent left thalamic stroke.  [facility]net lab w/ anemia noted and iron studies pending    S/p Left thalamic stroke, tx: Plavix 75mg and ASA 81mg      DM tx metformin 500mg BID ( pt d/c Jardiance- 2/2 side effects)  Denied increased thirst, ++ increased urination  A1C==>6.8    HTN, tx losartan 100mg, HCTZ 12.5, metoprolol 25 BID, and amlodipine 10mg.    Denied HA or focal deficit  ++ LE Edema, better in AM worse in PM, better when elevates in afternoon  BP ==> 136/56      HLD, tx fenofibrate 134mg and rosuvastatin  Patient denied angina or symptoms of a recurrent stroke.  LDL==>63.8      LVDD w/ pEF  Echo-  Mild left atrial enlargement.  The left ventricle is normal in size with normal systolic function.  The estimated ejection fraction is 65%.  Grade I left ventricular diastolic dysfunction.  Normal right ventricular size with normal right ventricular systolic function.  There is a 21 mm [facility]el pericardial bioprosthetic aortic valve present. There is no aortic insufficiency present. Prosthetic aortic valve is normal.  The aortic valve mean gradient is 10 mmHg with a dimensionless index of 0.54.  Normal central venous pressure (3 mmHg).  The estimated PA systolic pressure is 29 mmHg.  There is no pulmonary hypertension    Review of information gleaned at [facility]:  MRI +, revealed a left thalamic stroke  US Carotids:   ----Impression:   1. No hemodynamically significant stenosis on either side (<50% stenosis). There is scattered calcified plaque seen.  2. Forward flow both vertebral arteries.  Doppler color flow, and Doppler spectral analysis of the carotid system   ----Right plaque: mild  Additional comment(s): There is moderate plaque within the right carotid bulb extending into the origins of the internal and external  carotid arteries.   ----Left plaque: mild  Additional comment(s): There is mild atherosclerotic plaque within the carotid bulb extending into the origin of the left external carotid artery  CXR:  ----Impression:   1. Postoperative findings.  2. Questionable infiltrate at the left lung base. If indicated, a follow-up exam with lateral may be helpful for further evaluation.  Lateral CXR:  ----Findings: There are sternal wires. The heart is upper limits of normal in size. There is no vascular congestion. Mediastinum is unremarkable.      Neurology Recs: Dr. Kaveh Khoobehi   MRI/MRA reviewed  Echo no thrombus or shunt  ASA/Plavix/statin for stroke prevention  Adequate platelet inhibition  Normotension goal  Poorly controlled risk factors, BP elevated, , increase lipitor to 80mg, I think she is only on 40mg at home(NOW on ROSUVASTATIN)  Stroke education  Outpatient ST    Speech  --Patient presenting with moderate cognitive-linguistic deficits c/b disorientation, anomia, decreased awareness and memory   --Patient is without oropharyngeal dysphagia and is safe to continue a regular/thin diet     Echo:  -CONCLUSIONS   Mild left ventricular hypertrophy.   Normal left ventricular systolic function.   Left ventricular ejection fraction is estimated at 65%.   Normal bioprosthetic aortic valve.   A bubble study was done and appeared to be negative at rest and with valsalva.          Medcard:       ROS:         No fever, chills, or night sweats         No visual changes or d/c         No dysphagia or early satiety;               No cough or wheezing         No PND or orthopnea         No chest pain or palpitations         No weight change or LE edema         No GERD or abdominal pain         No change in bowel or blood in stool, diarrhea has resolved         No change in urination or dysuria or hematuria         No heat or cold intolerance         No rash or skin lesions or breakdown         No unusual bruising or  bleeding         No depression or anxiety              Remainder of review negative except as previously noted    PMHX: Reviewed  PSHX: Reviewed  FHX:    Reviewed  SHX:    Reviewed    PE:  VSS  GEN: WDWN, alert ,NAD, conversant and co-operative, but minimally conversant  EYES: Conj/lids unremarkable, sclera anicteric  RESP: Efforts unlabored, lungs CTA  CV:: Heart RRR w/o mgr, no carotid bruits noted  1+ pedal pulses, + LE edema primarily around the ankles  ( pt c/o of edema  But no significant pain associated, just discomfort)    MSK: Gait normal No CCE noted  NEURO: VIEIRA. No facial asymmetry or confusion noted  SKIN: Warm and dry    IMPRESSION/PLAN:  DMw/ microalbuminemia, A1C stable continue metformin daily as directed  CKD 3a associated diabetes, urge increased hydration and avoidance of NSAIDs  HTN associated DM, to resume BP checks at home,  Continue losartan 100 mg, amlodipine 10 mg metoprolol 25 mg and HCTZ 25 mg q.day; patient to check BP at home and if numbers or 130 or less systolic she will advise and will try amlodipine 5 mg to see if lower extremity edema response and if the blood pressure remains well controlled    HLD, associated DM, asymptomatic continue fenofibrate 134 Zetia 10 mg and rosuvastatin 40 q.day  H/O completed CVA  - Left thalamic stroke, felt to be embolic, event monitor pending  Expressive aphasia, significant improvement at this visit when patient presented alone provided her history  Disorder of arteries and arterioles-Carotid, asx, continue statin and other meds as noted previously  Aortic atherosclerosis,asx  Anemia, increased anemia with normal iron studies, patient denied any blood in stool or urine, and indices were normochromic normocytic, will continue to monitor, and patient to advise if she notices any unusual bruising or bleeding  Lower extremity edema, likely multifactorial with contribution from amlodipine-  Recommended compression stockings which patient declined,  recommended increasing hydration with water which patient reported she does not drink much of more often drinking soda drinks, and recommend planned lower extremity elevation in the afternoon  Urinary frequency, with dysuria- will check urine and urine culture as well as microalbumin creatinine for her diabetes  Call p.r.n.  Return to clinic 6 months with lab      PLAN:  Continue present meds  Rx update rosuvastatin  Lipid panel pending  RESTART PLAVIX  INCREASE HCTZ 25mg ( was on 50 pre-stroke)  Continue metformin one daily   A1C pending to determine if need qd/bid  Pt needs to complete event monitor, may need to come into office   Call prn  RTC 6 mos

## 2023-03-06 ENCOUNTER — LAB VISIT (OUTPATIENT)
Dept: LAB | Facility: HOSPITAL | Age: 83
End: 2023-03-06
Attending: INTERNAL MEDICINE
Payer: MEDICARE

## 2023-03-06 DIAGNOSIS — D50.9 IRON DEFICIENCY ANEMIA, UNSPECIFIED IRON DEFICIENCY ANEMIA TYPE: ICD-10-CM

## 2023-03-06 LAB
IRON SERPL-MCNC: 96 UG/DL (ref 30–160)
SATURATED IRON: 23 % (ref 20–50)
TOTAL IRON BINDING CAPACITY: 416 UG/DL (ref 250–450)
TRANSFERRIN SERPL-MCNC: 281 MG/DL (ref 200–375)

## 2023-03-06 PROCEDURE — 36415 COLL VENOUS BLD VENIPUNCTURE: CPT | Mod: HCNC,PO | Performed by: INTERNAL MEDICINE

## 2023-03-06 PROCEDURE — 84466 ASSAY OF TRANSFERRIN: CPT | Mod: HCNC | Performed by: INTERNAL MEDICINE

## 2023-03-08 ENCOUNTER — OFFICE VISIT (OUTPATIENT)
Dept: INTERNAL MEDICINE | Facility: CLINIC | Age: 83
End: 2023-03-08
Payer: MEDICARE

## 2023-03-08 VITALS
BODY MASS INDEX: 27.74 KG/M2 | TEMPERATURE: 97 F | WEIGHT: 172.63 LBS | HEART RATE: 68 BPM | DIASTOLIC BLOOD PRESSURE: 56 MMHG | SYSTOLIC BLOOD PRESSURE: 136 MMHG | OXYGEN SATURATION: 97 % | RESPIRATION RATE: 16 BRPM | HEIGHT: 66 IN

## 2023-03-08 DIAGNOSIS — E11.22 CKD STAGE 3 SECONDARY TO DIABETES: ICD-10-CM

## 2023-03-08 DIAGNOSIS — E11.59 HYPERTENSION ASSOCIATED WITH DIABETES: ICD-10-CM

## 2023-03-08 DIAGNOSIS — E11.69 HYPERLIPIDEMIA ASSOCIATED WITH TYPE 2 DIABETES MELLITUS: ICD-10-CM

## 2023-03-08 DIAGNOSIS — R80.9 TYPE 2 DIABETES MELLITUS WITH MICROALBUMINURIA, WITHOUT LONG-TERM CURRENT USE OF INSULIN: Primary | ICD-10-CM

## 2023-03-08 DIAGNOSIS — Z86.73 H/O COMPLETED STROKE: ICD-10-CM

## 2023-03-08 DIAGNOSIS — I77.9 DISORDER OF ARTERIES AND ARTERIOLES: ICD-10-CM

## 2023-03-08 DIAGNOSIS — I15.2 HYPERTENSION ASSOCIATED WITH DIABETES: ICD-10-CM

## 2023-03-08 DIAGNOSIS — N18.30 CKD STAGE 3 SECONDARY TO DIABETES: ICD-10-CM

## 2023-03-08 DIAGNOSIS — I70.0 AORTIC ATHEROSCLEROSIS: ICD-10-CM

## 2023-03-08 DIAGNOSIS — E11.29 TYPE 2 DIABETES MELLITUS WITH MICROALBUMINURIA, WITHOUT LONG-TERM CURRENT USE OF INSULIN: Primary | ICD-10-CM

## 2023-03-08 DIAGNOSIS — E78.5 HYPERLIPIDEMIA ASSOCIATED WITH TYPE 2 DIABETES MELLITUS: ICD-10-CM

## 2023-03-08 DIAGNOSIS — R35.0 URINARY FREQUENCY: ICD-10-CM

## 2023-03-08 LAB
ALBUMIN/CREAT UR: 44.4 UG/MG (ref 0–30)
BACTERIA #/AREA URNS AUTO: ABNORMAL /HPF
BILIRUB UR QL STRIP: NEGATIVE
CLARITY UR REFRACT.AUTO: CLEAR
COLOR UR AUTO: YELLOW
CREAT UR-MCNC: 133 MG/DL (ref 15–325)
GLUCOSE UR QL STRIP: NEGATIVE
HGB UR QL STRIP: NEGATIVE
HYALINE CASTS UR QL AUTO: 20 /LPF
KETONES UR QL STRIP: NEGATIVE
LEUKOCYTE ESTERASE UR QL STRIP: ABNORMAL
MICROALBUMIN UR DL<=1MG/L-MCNC: 59 UG/ML
MICROSCOPIC COMMENT: ABNORMAL
NITRITE UR QL STRIP: NEGATIVE
PH UR STRIP: 6 [PH] (ref 5–8)
PROT UR QL STRIP: ABNORMAL
RBC #/AREA URNS AUTO: 4 /HPF (ref 0–4)
SP GR UR STRIP: 1.02 (ref 1–1.03)
SQUAMOUS #/AREA URNS AUTO: 1 /HPF
URN SPEC COLLECT METH UR: ABNORMAL
WBC #/AREA URNS AUTO: 20 /HPF (ref 0–5)

## 2023-03-08 PROCEDURE — 87077 CULTURE AEROBIC IDENTIFY: CPT | Mod: HCNC | Performed by: INTERNAL MEDICINE

## 2023-03-08 PROCEDURE — 1159F PR MEDICATION LIST DOCUMENTED IN MEDICAL RECORD: ICD-10-PCS | Mod: HCNC,CPTII,S$GLB, | Performed by: INTERNAL MEDICINE

## 2023-03-08 PROCEDURE — 1101F PR PT FALLS ASSESS DOC 0-1 FALLS W/OUT INJ PAST YR: ICD-10-PCS | Mod: HCNC,CPTII,S$GLB, | Performed by: INTERNAL MEDICINE

## 2023-03-08 PROCEDURE — 99214 OFFICE O/P EST MOD 30 MIN: CPT | Mod: HCNC,S$GLB,, | Performed by: INTERNAL MEDICINE

## 2023-03-08 PROCEDURE — 3288F FALL RISK ASSESSMENT DOCD: CPT | Mod: HCNC,CPTII,S$GLB, | Performed by: INTERNAL MEDICINE

## 2023-03-08 PROCEDURE — 99214 PR OFFICE/OUTPT VISIT, EST, LEVL IV, 30-39 MIN: ICD-10-PCS | Mod: HCNC,S$GLB,, | Performed by: INTERNAL MEDICINE

## 2023-03-08 PROCEDURE — 99999 PR PBB SHADOW E&M-EST. PATIENT-LVL IV: ICD-10-PCS | Mod: PBBFAC,HCNC,, | Performed by: INTERNAL MEDICINE

## 2023-03-08 PROCEDURE — 1160F PR REVIEW ALL MEDS BY PRESCRIBER/CLIN PHARMACIST DOCUMENTED: ICD-10-PCS | Mod: HCNC,CPTII,S$GLB, | Performed by: INTERNAL MEDICINE

## 2023-03-08 PROCEDURE — 1160F RVW MEDS BY RX/DR IN RCRD: CPT | Mod: HCNC,CPTII,S$GLB, | Performed by: INTERNAL MEDICINE

## 2023-03-08 PROCEDURE — 81001 URINALYSIS AUTO W/SCOPE: CPT | Mod: HCNC | Performed by: INTERNAL MEDICINE

## 2023-03-08 PROCEDURE — 1101F PT FALLS ASSESS-DOCD LE1/YR: CPT | Mod: HCNC,CPTII,S$GLB, | Performed by: INTERNAL MEDICINE

## 2023-03-08 PROCEDURE — 3075F SYST BP GE 130 - 139MM HG: CPT | Mod: HCNC,CPTII,S$GLB, | Performed by: INTERNAL MEDICINE

## 2023-03-08 PROCEDURE — 87088 URINE BACTERIA CULTURE: CPT | Mod: HCNC | Performed by: INTERNAL MEDICINE

## 2023-03-08 PROCEDURE — 1159F MED LIST DOCD IN RCRD: CPT | Mod: HCNC,CPTII,S$GLB, | Performed by: INTERNAL MEDICINE

## 2023-03-08 PROCEDURE — 3075F PR MOST RECENT SYSTOLIC BLOOD PRESS GE 130-139MM HG: ICD-10-PCS | Mod: HCNC,CPTII,S$GLB, | Performed by: INTERNAL MEDICINE

## 2023-03-08 PROCEDURE — 1126F PR PAIN SEVERITY QUANTIFIED, NO PAIN PRESENT: ICD-10-PCS | Mod: HCNC,CPTII,S$GLB, | Performed by: INTERNAL MEDICINE

## 2023-03-08 PROCEDURE — 82570 ASSAY OF URINE CREATININE: CPT | Mod: HCNC | Performed by: INTERNAL MEDICINE

## 2023-03-08 PROCEDURE — 3288F PR FALLS RISK ASSESSMENT DOCUMENTED: ICD-10-PCS | Mod: HCNC,CPTII,S$GLB, | Performed by: INTERNAL MEDICINE

## 2023-03-08 PROCEDURE — 87186 SC STD MICRODIL/AGAR DIL: CPT | Mod: HCNC | Performed by: INTERNAL MEDICINE

## 2023-03-08 PROCEDURE — 87086 URINE CULTURE/COLONY COUNT: CPT | Mod: HCNC | Performed by: INTERNAL MEDICINE

## 2023-03-08 PROCEDURE — 3078F DIAST BP <80 MM HG: CPT | Mod: HCNC,CPTII,S$GLB, | Performed by: INTERNAL MEDICINE

## 2023-03-08 PROCEDURE — 3078F PR MOST RECENT DIASTOLIC BLOOD PRESSURE < 80 MM HG: ICD-10-PCS | Mod: HCNC,CPTII,S$GLB, | Performed by: INTERNAL MEDICINE

## 2023-03-08 PROCEDURE — 99999 PR PBB SHADOW E&M-EST. PATIENT-LVL IV: CPT | Mod: PBBFAC,HCNC,, | Performed by: INTERNAL MEDICINE

## 2023-03-08 PROCEDURE — 1126F AMNT PAIN NOTED NONE PRSNT: CPT | Mod: HCNC,CPTII,S$GLB, | Performed by: INTERNAL MEDICINE

## 2023-03-10 LAB — BACTERIA UR CULT: ABNORMAL

## 2023-03-11 ENCOUNTER — TELEPHONE (OUTPATIENT)
Dept: INTERNAL MEDICINE | Facility: CLINIC | Age: 83
End: 2023-03-11
Payer: MEDICARE

## 2023-03-11 RX ORDER — AMOXICILLIN AND CLAVULANATE POTASSIUM 875; 125 MG/1; MG/1
1 TABLET, FILM COATED ORAL 2 TIMES DAILY
Qty: 14 TABLET | Refills: 0 | Status: SHIPPED | OUTPATIENT
Start: 2023-03-11 | End: 2023-05-30 | Stop reason: ALTCHOICE

## 2023-03-11 NOTE — TELEPHONE ENCOUNTER
Patient's urine culture revealed an infection  A prescription for Augmentin will be sent to her local pharmacy and she will be advise via the portal

## 2023-03-13 ENCOUNTER — TELEPHONE (OUTPATIENT)
Dept: INTERNAL MEDICINE | Facility: CLINIC | Age: 83
End: 2023-03-13
Payer: MEDICARE

## 2023-03-13 NOTE — TELEPHONE ENCOUNTER
Spoke to pt's son, Alphonso. He stated that he has MPOA. I advised that we would like a copy to place in chart.  He stated that he will try to to get us a copy.    I advised of results, as pt does not check her [facility] account since her CVA.

## 2023-03-13 NOTE — TELEPHONE ENCOUNTER
----- Message from Karina Wilson MD sent at 3/11/2023 12:01 PM CST -----  Your urine culture has resulted and you have an infection.  A prescription for Augmentin has been sent to your local [facility] to be taken twice daily for 7 days.  Please take the antibiotic with food and 8 oz of liquid and include some probiotics in your daily diet.  valentín

## 2023-04-12 ENCOUNTER — TELEPHONE (OUTPATIENT)
Dept: CARDIOLOGY | Facility: CLINIC | Age: 83
End: 2023-04-12
Payer: MEDICARE

## 2023-04-26 ENCOUNTER — PES CALL (OUTPATIENT)
Dept: ADMINISTRATIVE | Facility: CLINIC | Age: 83
End: 2023-04-26
Payer: MEDICARE

## 2023-05-21 ENCOUNTER — PATIENT MESSAGE (OUTPATIENT)
Dept: INTERNAL MEDICINE | Facility: CLINIC | Age: 83
End: 2023-05-21
Payer: MEDICARE

## 2023-05-22 ENCOUNTER — PATIENT MESSAGE (OUTPATIENT)
Dept: INTERNAL MEDICINE | Facility: CLINIC | Age: 83
End: 2023-05-22
Payer: MEDICARE

## 2023-05-22 ENCOUNTER — TELEPHONE (OUTPATIENT)
Dept: CARDIOLOGY | Facility: CLINIC | Age: 83
End: 2023-05-22
Payer: MEDICARE

## 2023-05-22 NOTE — TELEPHONE ENCOUNTER
Would take one daily til her routine settles back in and she is back to her baseline, typically safer to run a little higher than lower after a stroke      And will review at [facility] appt

## 2023-05-22 NOTE — TELEPHONE ENCOUNTER
The d/c meds are listed below as noted from hospital d/c     HCTZ w/ BOTH the 12.5 and 25mg doses are listed ( very confusing)                 however- would recommend she take the 12.5mg and monitor her BP and give a 2nd dose if the BP was >160 systolic and/or > 100 diastolic    Metformin 500mg take with breakfast    Metoprolol 25mg take twice daily      at Time of Discharge  Medication Sig Dispensed Refills Start Date End Date   amLODIPine (NORVASC) 10 MG tablet    Indications: hypertension, Change to evening Take 10 mg daily by mouth   0       aspirin (LO-DOSE ASPIRIN) 81 MG EC tablet   Take 81 mg daily by mouth   0       cholecalciferol, vitamin D3, 25 mcg, 1,000 unit, 1000 UNITS tablet   Take 1,000 Units daily by mouth   0       clopidogreL (PLAVIX) 75 mg tablet   Take 1 tablet daily by mouth   0 02/06/2023     donepeziL (ARICEPT) 5 MG tablet   Take 1 tablet nightly by mouth 30 tablet   3 12/28/2022 12/28/2023   ezetimibe (ZETIA) 10 mg tablet       0 03/11/2023     fenofibrate micronized (LOFIBRA) 134 MG capsule   Take 134 mg every morning before breakfast by mouth   0       hydroCHLOROthiazide (HYDRODIURIL) 12.5 MG tablet   Take 12.5 mg by mouth   0 06/16/2022     hydroCHLOROthiazide (HYDRODIURIL) 25 MG tablet       0 02/01/2023     losartan (COZAAR) 100 MG tablet   Take 100 mg daily by mouth   0       metFORMIN (GLUCOPHAGE) 500 MG tablet   Take 500 mg daily with breakfast by mouth   0       metoprolol tartrate (LOPRESSOR) 25 MG tablet   Take 25 mg 2 (two) times daily by mouth   0       omeprazole (PRILOSEC) 20 MG capsule   Take 20 mg daily by mouth   0       rosuvastatin (CRESTOR) 40 MG tablet   Take 40 mg by mouth   0 06/18/2022 06/18/2023

## 2023-05-22 NOTE — TELEPHONE ENCOUNTER
Pt's son, Alphonso calling to report pt was at [facility] over the weekend for a stroke. Son was told to have you review records from recent admission and advise is pt should remain on Aspirin and Plavix. Hospital follow up appointment scheduled for 6/14 @ 10am. Please advise

## 2023-05-23 ENCOUNTER — PATIENT MESSAGE (OUTPATIENT)
Dept: INTERNAL MEDICINE | Facility: CLINIC | Age: 83
End: 2023-05-23
Payer: MEDICARE

## 2023-05-24 ENCOUNTER — EXTERNAL HOSPITAL ADMISSION (OUTPATIENT)
Dept: ADMINISTRATIVE | Facility: CLINIC | Age: 83
End: 2023-05-24
Payer: MEDICARE

## 2023-05-24 ENCOUNTER — PATIENT OUTREACH (OUTPATIENT)
Dept: ADMINISTRATIVE | Facility: CLINIC | Age: 83
End: 2023-05-24
Payer: MEDICARE

## 2023-05-24 NOTE — PROGRESS NOTES
C3 nurse spoke with Zaida Liu's son, Alphonso, for a [facility] post hospital discharge follow up call. The patient has a scheduled HOSFU appointment with Dr. Cabrales (PCP) 5/30/23 @ 10:30am.     Medications listed on discharge summary show some discrepancies from home medications. HCTZ 25mg and 12.5mg were listed in discharge summary, but no directions on administration were next to the 25mg HCTZ, therefore the patient's son is breaking the 25mg HCTZ in half, as that is what was available at home. They are monitoring her BP as well, and documenting it to bring to HOSFU appointment with PCP. Also, the patient has always taken Metformin 500mg BID, but DC summary states to take it once in the morning. Son has been giving once daily in PM, as patient states it makes her too tired to take during the day. Upon DC they told the patient's son not to change any medications until they see PCP and Cardio, but the list for DC did not coincide with at home medications. C3 nurse was unable to find the documentation of medications administered during hospitalization, or the last dosage date and time. Patient's son will bring all home medications and DC summary to appointment.     Patient's son also questioned what possible options they have access to, should their mother be deemed unsafe to stay home alone with frequent check-ins? Her 3 children are currently alternating 24/7 care for her, but they want to know what kind of assistance they can get once she is stronger, since she cannot drive and used to do her own grocery shopping and drive herself to appointments. He understands this will be some time down the road, but would like some information on what would be available when that time comes.     She will also be seeing Cardiology on 6/14/23, to discuss the blood thinners she is currently taking. Son stated that there was some concern on whether or not these should be changed, due to her having a cardiac episode post intubation  from surgery.

## 2023-05-27 NOTE — PROGRESS NOTES
CC: Hosp follow up        83 y.o. female patient presents in f/u to hospitalization       Admitted: 5/19/2023       D/C:5/21/2023    Family and/or Caretaker present at visit?  Daughter+/Alphonso on phone  Diagnostic tests reviewed/disposition: lab/  Disease/illness education:stroke, HTN, DM   Home health/community services discussion/referrals:  consider HH   Establishment or re-establishment of referral orders for community resources:  consider [facility], [facility]- rec call [place] [facility]  Discussion with other health care providers:  n/a            Patient was seen in ED, after grandson noted her right sided weakness and aphasia when they were sharing their morning coffee   82 yo female w/ HLD, HTN, DM, CKD 3 and prior CVA  presented with aphasia as her primary symptom as well as mild right sided weakness. CT perfusion showed area of penumumbra in the left MCA territory as well as CTA showing occlusion of L MCA M2 posterior branch, she was taken for emergent thrombectomy with Dr. Hill. The result of TICI 3 recanalization. MRI brain and rest of stroke up revealed smaller area of infarct than presents on CT P prior and she has shown signs of improving clinically. Cards evaluated patient for some ST changes after her surgery and said there was no evidence of NSTEMI. TTE showed good EF and patency of prior aortic valve. Patient cleared by PT and OT to go home with supervision. She has good family support. CM consulted to assist with possible outpatient SLP set up. Discussed with patients family how she will need to be watched closely, but she is stable for discharge. Instructed patient and son to continue Aspirin abd Plavix daily.               Since discharge:       Sx:  Recalls to straighten out medication regimen, otherwise patient is back to her baseline except for some memory deficits, in particular concerning the events surrounding, and leading up to her admission  Son reports she is back to walking up and down the  stairs and fixing a little food with the microwave.  They have concerns about her ability to care for herself and the possibility of an other stroke, especially in the setting where she cannot call for help.              Medcard:       ROS:         No fever, chills, or night sweats         No visual changes or d/c         No dysphagia or early satiety;       Appetite good         No cough or wheezing         No PND or orthopnea         No chest pain or palpitations         No weight change or LE edema         No GERD or abdominal pain         No change in bowel or blood in stool         No change in urination or dysuria or hematuria         No heat or cold intolerance         No rash or skin lesions or breakdown         No unusual bruising or bleeding         No depression or anxiety         No memory changes or confusion         No HA or focal deficits             Remainder of review negative except as previously noted    PMHX: Reviewed  PSHX: Reviewed  FHX:    Reviewed  SHX:    Reviewed    PE:  VSS  GEN: WDWN, answers questions slowly appear somewhat confused and has to think through them especially questions concerning her symptoms and her admission and stay at the hospital, NAD, conversant and co-operative  EYES: Conj/lids unremarkable, sclera anicteric  NECK: Supple w/o lymphadenopathy or thyromegaly  RESP: Efforts unlabored, lungs CTAP  CV:: Heart RRR w/o mgr, No carotid bruits noted,    dim pedal pulses , no edema  GI: Abdomen BS+, soft, NT/ND, - HSM noted  MSK: Gait normal  No CCE noted  NEURO: VIEIRA. 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 she was on prior to admit at a concerns for hypoglycemia in the immediate post stroke setting      PLAN:  Post Hospital D/C Medications have been reconciled  Amlodipine 10mg qd  ASA 81mg qd  Fenofibrate 134 qd  Losartan 100mg qd  Metformin 500mg w/ breakfast (patient was on b.i.d.)  Metoprolol 25mg bid  Omeprazole 20mg qd  D2 1K qd  HCTZ 12.5mg qd ( patient was on 25 mg is q.day)  Rosuvastatin 40mg qd  Donepezil 5 mg q hs  Clopidogrel 75 mg q.day  Zetia 10 mg q.day    Reviewed options with patient and family including contacting counseled the Aging for possible [place] transportation services and meals on wheels services  Reviewed recommendations for discussing with [employer] possible lifeline plus or minus connect stability if that is offered  Reviewed home health +/-PT OT but with nurse to measure blood pressure which family is going to do over the next 3 weeks while they are staying with the patient and  for further community options

## 2023-05-30 ENCOUNTER — OFFICE VISIT (OUTPATIENT)
Dept: INTERNAL MEDICINE | Facility: CLINIC | Age: 83
End: 2023-05-30
Payer: MEDICARE

## 2023-05-30 VITALS
RESPIRATION RATE: 15 BRPM | HEIGHT: 66 IN | TEMPERATURE: 98 F | OXYGEN SATURATION: 97 % | BODY MASS INDEX: 27.77 KG/M2 | HEART RATE: 68 BPM | DIASTOLIC BLOOD PRESSURE: 58 MMHG | WEIGHT: 172.81 LBS | SYSTOLIC BLOOD PRESSURE: 136 MMHG

## 2023-05-30 DIAGNOSIS — E11.29 TYPE 2 DIABETES MELLITUS WITH MICROALBUMINURIA, WITHOUT LONG-TERM CURRENT USE OF INSULIN: ICD-10-CM

## 2023-05-30 DIAGNOSIS — R47.1 DYSARTHRIA DUE TO ACUTE STROKE: ICD-10-CM

## 2023-05-30 DIAGNOSIS — N18.32 CHRONIC KIDNEY DISEASE, STAGE 3B: ICD-10-CM

## 2023-05-30 DIAGNOSIS — R80.9 TYPE 2 DIABETES MELLITUS WITH MICROALBUMINURIA, WITHOUT LONG-TERM CURRENT USE OF INSULIN: ICD-10-CM

## 2023-05-30 DIAGNOSIS — I63.312 CEREBROVASCULAR ACCIDENT (CVA) DUE TO THROMBOSIS OF LEFT MIDDLE CEREBRAL ARTERY: ICD-10-CM

## 2023-05-30 DIAGNOSIS — I16.0 HYPERTENSIVE URGENCY: ICD-10-CM

## 2023-05-30 DIAGNOSIS — I63.9 DYSARTHRIA DUE TO ACUTE STROKE: ICD-10-CM

## 2023-05-30 DIAGNOSIS — Z09 HOSPITAL DISCHARGE FOLLOW-UP: Primary | ICD-10-CM

## 2023-05-30 DIAGNOSIS — Z86.73 H/O COMPLETED STROKE: ICD-10-CM

## 2023-05-30 PROCEDURE — 1101F PT FALLS ASSESS-DOCD LE1/YR: CPT | Mod: CPTII,S$GLB,, | Performed by: INTERNAL MEDICINE

## 2023-05-30 PROCEDURE — 3075F PR MOST RECENT SYSTOLIC BLOOD PRESS GE 130-139MM HG: ICD-10-PCS | Mod: CPTII,S$GLB,, | Performed by: INTERNAL MEDICINE

## 2023-05-30 PROCEDURE — 99999 PR PBB SHADOW E&M-EST. PATIENT-LVL IV: CPT | Mod: PBBFAC,,, | Performed by: INTERNAL MEDICINE

## 2023-05-30 PROCEDURE — 1159F MED LIST DOCD IN RCRD: CPT | Mod: CPTII,S$GLB,, | Performed by: INTERNAL MEDICINE

## 2023-05-30 PROCEDURE — 1126F AMNT PAIN NOTED NONE PRSNT: CPT | Mod: CPTII,S$GLB,, | Performed by: INTERNAL MEDICINE

## 2023-05-30 PROCEDURE — 3288F FALL RISK ASSESSMENT DOCD: CPT | Mod: CPTII,S$GLB,, | Performed by: INTERNAL MEDICINE

## 2023-05-30 PROCEDURE — 3288F PR FALLS RISK ASSESSMENT DOCUMENTED: ICD-10-PCS | Mod: CPTII,S$GLB,, | Performed by: INTERNAL MEDICINE

## 2023-05-30 PROCEDURE — 99496 TRANSITIONAL CARE MANAGE SERVICE 7 DAY DISCHARGE: ICD-10-PCS | Mod: S$GLB,,, | Performed by: INTERNAL MEDICINE

## 2023-05-30 PROCEDURE — 1126F PR PAIN SEVERITY QUANTIFIED, NO PAIN PRESENT: ICD-10-PCS | Mod: CPTII,S$GLB,, | Performed by: INTERNAL MEDICINE

## 2023-05-30 PROCEDURE — 99496 TRANSJ CARE MGMT HIGH F2F 7D: CPT | Mod: S$GLB,,, | Performed by: INTERNAL MEDICINE

## 2023-05-30 PROCEDURE — 1101F PR PT FALLS ASSESS DOC 0-1 FALLS W/OUT INJ PAST YR: ICD-10-PCS | Mod: CPTII,S$GLB,, | Performed by: INTERNAL MEDICINE

## 2023-05-30 PROCEDURE — 99999 PR PBB SHADOW E&M-EST. PATIENT-LVL IV: ICD-10-PCS | Mod: PBBFAC,,, | Performed by: INTERNAL MEDICINE

## 2023-05-30 PROCEDURE — 3078F DIAST BP <80 MM HG: CPT | Mod: CPTII,S$GLB,, | Performed by: INTERNAL MEDICINE

## 2023-05-30 PROCEDURE — 3078F PR MOST RECENT DIASTOLIC BLOOD PRESSURE < 80 MM HG: ICD-10-PCS | Mod: CPTII,S$GLB,, | Performed by: INTERNAL MEDICINE

## 2023-05-30 PROCEDURE — 3075F SYST BP GE 130 - 139MM HG: CPT | Mod: CPTII,S$GLB,, | Performed by: INTERNAL MEDICINE

## 2023-05-30 PROCEDURE — 1159F PR MEDICATION LIST DOCUMENTED IN MEDICAL RECORD: ICD-10-PCS | Mod: CPTII,S$GLB,, | Performed by: INTERNAL MEDICINE

## 2023-05-30 RX ORDER — HYDROCHLOROTHIAZIDE 25 MG/1
12.5 TABLET ORAL DAILY
Qty: 90 TABLET | Refills: 3
Start: 2023-05-30 | End: 2023-09-09

## 2023-05-30 RX ORDER — METFORMIN HYDROCHLORIDE 500 MG/1
500 TABLET ORAL
Qty: 180 TABLET | Refills: 3
Start: 2023-05-30 | End: 2023-11-25

## 2023-05-30 NOTE — Clinical Note
Chelo Archibald, Hope you had a good trip.  Saw our mutual patient today in clinic for hospital follow-up for another stroke, she is status post left middle cerebral thrombosis with thrombectomy.  She is to see you June 14th, the son reported the practitioner who did the thrombectomy wanted you to review the Plavix and consider other options.  Please let me know what you think, of the setting her up for home health in a couple of weeks after the family goes back to work.  I have to her HCTZ and her metformin to prevent hypotension and or hypoglycemia, but anticipate that she will need them reinstated in a couple weeks. Thanks, Karina Farr

## 2023-06-07 ENCOUNTER — TELEPHONE (OUTPATIENT)
Dept: CARDIOLOGY | Facility: CLINIC | Age: 83
End: 2023-06-07
Payer: MEDICARE

## 2023-06-07 DIAGNOSIS — E66.9 OBESITY, DIABETES, AND HYPERTENSION SYNDROME: Primary | ICD-10-CM

## 2023-06-07 DIAGNOSIS — E08.65 DIABETES MELLITUS DUE TO UNDERLYING CONDITION WITH HYPERGLYCEMIA: ICD-10-CM

## 2023-06-07 DIAGNOSIS — E11.59 OBESITY, DIABETES, AND HYPERTENSION SYNDROME: Primary | ICD-10-CM

## 2023-06-07 DIAGNOSIS — I70.0 AORTIC ATHEROSCLEROSIS: ICD-10-CM

## 2023-06-07 DIAGNOSIS — E11.69 OBESITY, DIABETES, AND HYPERTENSION SYNDROME: Primary | ICD-10-CM

## 2023-06-07 DIAGNOSIS — I15.2 OBESITY, DIABETES, AND HYPERTENSION SYNDROME: Primary | ICD-10-CM

## 2023-06-12 ENCOUNTER — LAB VISIT (OUTPATIENT)
Dept: LAB | Facility: HOSPITAL | Age: 83
End: 2023-06-12
Attending: INTERNAL MEDICINE
Payer: MEDICARE

## 2023-06-12 DIAGNOSIS — E11.69 OBESITY, DIABETES, AND HYPERTENSION SYNDROME: ICD-10-CM

## 2023-06-12 DIAGNOSIS — I70.0 AORTIC ATHEROSCLEROSIS: ICD-10-CM

## 2023-06-12 DIAGNOSIS — I15.2 OBESITY, DIABETES, AND HYPERTENSION SYNDROME: ICD-10-CM

## 2023-06-12 DIAGNOSIS — E66.9 OBESITY, DIABETES, AND HYPERTENSION SYNDROME: ICD-10-CM

## 2023-06-12 DIAGNOSIS — E08.65 DIABETES MELLITUS DUE TO UNDERLYING CONDITION WITH HYPERGLYCEMIA: ICD-10-CM

## 2023-06-12 DIAGNOSIS — E11.59 OBESITY, DIABETES, AND HYPERTENSION SYNDROME: ICD-10-CM

## 2023-06-12 LAB
ALBUMIN SERPL BCP-MCNC: 4.1 G/DL (ref 3.5–5.2)
ALP SERPL-CCNC: 44 U/L (ref 55–135)
ALT SERPL W/O P-5'-P-CCNC: 18 U/L (ref 10–44)
ANION GAP SERPL CALC-SCNC: 9 MMOL/L (ref 8–16)
AST SERPL-CCNC: 21 U/L (ref 10–40)
BILIRUB SERPL-MCNC: 0.5 MG/DL (ref 0.1–1)
BUN SERPL-MCNC: 27 MG/DL (ref 8–23)
CALCIUM SERPL-MCNC: 9.6 MG/DL (ref 8.7–10.5)
CHLORIDE SERPL-SCNC: 104 MMOL/L (ref 95–110)
CHOLEST SERPL-MCNC: 160 MG/DL (ref 120–199)
CHOLEST/HDLC SERPL: 3.1 {RATIO} (ref 2–5)
CO2 SERPL-SCNC: 28 MMOL/L (ref 23–29)
CREAT SERPL-MCNC: 1.6 MG/DL (ref 0.5–1.4)
EST. GFR  (NO RACE VARIABLE): 32 ML/MIN/1.73 M^2
GLUCOSE SERPL-MCNC: 156 MG/DL (ref 70–110)
HDLC SERPL-MCNC: 51 MG/DL (ref 40–75)
HDLC SERPL: 31.9 % (ref 20–50)
LDLC SERPL CALC-MCNC: 77.8 MG/DL (ref 63–159)
NONHDLC SERPL-MCNC: 109 MG/DL
POTASSIUM SERPL-SCNC: 4.3 MMOL/L (ref 3.5–5.1)
PROT SERPL-MCNC: 7.1 G/DL (ref 6–8.4)
SODIUM SERPL-SCNC: 141 MMOL/L (ref 136–145)
TRIGL SERPL-MCNC: 156 MG/DL (ref 30–150)
TSH SERPL DL<=0.005 MIU/L-ACNC: 1.46 UIU/ML (ref 0.4–4)

## 2023-06-12 PROCEDURE — 80061 LIPID PANEL: CPT | Performed by: INTERNAL MEDICINE

## 2023-06-12 PROCEDURE — 84443 ASSAY THYROID STIM HORMONE: CPT | Performed by: INTERNAL MEDICINE

## 2023-06-12 PROCEDURE — 36415 COLL VENOUS BLD VENIPUNCTURE: CPT | Mod: PN | Performed by: INTERNAL MEDICINE

## 2023-06-12 PROCEDURE — 80053 COMPREHEN METABOLIC PANEL: CPT | Performed by: INTERNAL MEDICINE

## 2023-06-12 PROCEDURE — 83036 HEMOGLOBIN GLYCOSYLATED A1C: CPT | Performed by: INTERNAL MEDICINE

## 2023-06-13 LAB
ESTIMATED AVG GLUCOSE: 151 MG/DL (ref 68–131)
HBA1C MFR BLD: 6.9 % (ref 4–5.6)

## 2023-06-13 RX ORDER — HYDROCHLOROTHIAZIDE 25 MG/1
12.5 TABLET ORAL DAILY
Qty: 90 TABLET | Refills: 3 | Status: CANCELLED | OUTPATIENT
Start: 2023-06-13

## 2023-06-13 RX ORDER — METFORMIN HYDROCHLORIDE 500 MG/1
500 TABLET ORAL
Qty: 180 TABLET | Refills: 3 | Status: CANCELLED | OUTPATIENT
Start: 2023-06-13

## 2023-06-13 NOTE — TELEPHONE ENCOUNTER
Warning appeared when trying to E script metformin that patient's renal function was such that the metformin would not be recommended.  When new lab was reviewed indeed her creatinine was up and her EGFR had declined and will hold the metformin at this time  Tried to call patient but she did not have voicemail set up, tried to call son Alphonso did leave a message with him to hold metformin and to call us back for further instructions

## 2023-06-14 ENCOUNTER — OFFICE VISIT (OUTPATIENT)
Dept: CARDIOLOGY | Facility: CLINIC | Age: 83
End: 2023-06-14
Payer: MEDICARE

## 2023-06-14 ENCOUNTER — TELEPHONE (OUTPATIENT)
Dept: INTERNAL MEDICINE | Facility: CLINIC | Age: 83
End: 2023-06-14
Payer: MEDICARE

## 2023-06-14 VITALS
BODY MASS INDEX: 28.64 KG/M2 | SYSTOLIC BLOOD PRESSURE: 136 MMHG | DIASTOLIC BLOOD PRESSURE: 62 MMHG | HEART RATE: 61 BPM | WEIGHT: 171.88 LBS | HEIGHT: 65 IN

## 2023-06-14 DIAGNOSIS — I15.2 HYPERTENSION ASSOCIATED WITH DIABETES: ICD-10-CM

## 2023-06-14 DIAGNOSIS — I63.9 CEREBROVASCULAR ACCIDENT (CVA), UNSPECIFIED MECHANISM: Primary | ICD-10-CM

## 2023-06-14 DIAGNOSIS — E11.59 HYPERTENSION ASSOCIATED WITH DIABETES: ICD-10-CM

## 2023-06-14 PROCEDURE — 99999 PR PBB SHADOW E&M-EST. PATIENT-LVL III: CPT | Mod: PBBFAC,,, | Performed by: INTERNAL MEDICINE

## 2023-06-14 PROCEDURE — 3078F DIAST BP <80 MM HG: CPT | Mod: CPTII,S$GLB,, | Performed by: INTERNAL MEDICINE

## 2023-06-14 PROCEDURE — 3288F PR FALLS RISK ASSESSMENT DOCUMENTED: ICD-10-PCS | Mod: CPTII,S$GLB,, | Performed by: INTERNAL MEDICINE

## 2023-06-14 PROCEDURE — 1159F MED LIST DOCD IN RCRD: CPT | Mod: CPTII,S$GLB,, | Performed by: INTERNAL MEDICINE

## 2023-06-14 PROCEDURE — 1126F AMNT PAIN NOTED NONE PRSNT: CPT | Mod: CPTII,S$GLB,, | Performed by: INTERNAL MEDICINE

## 2023-06-14 PROCEDURE — 1126F PR PAIN SEVERITY QUANTIFIED, NO PAIN PRESENT: ICD-10-PCS | Mod: CPTII,S$GLB,, | Performed by: INTERNAL MEDICINE

## 2023-06-14 PROCEDURE — 3288F FALL RISK ASSESSMENT DOCD: CPT | Mod: CPTII,S$GLB,, | Performed by: INTERNAL MEDICINE

## 2023-06-14 PROCEDURE — 3075F SYST BP GE 130 - 139MM HG: CPT | Mod: CPTII,S$GLB,, | Performed by: INTERNAL MEDICINE

## 2023-06-14 PROCEDURE — 3075F PR MOST RECENT SYSTOLIC BLOOD PRESS GE 130-139MM HG: ICD-10-PCS | Mod: CPTII,S$GLB,, | Performed by: INTERNAL MEDICINE

## 2023-06-14 PROCEDURE — 1159F PR MEDICATION LIST DOCUMENTED IN MEDICAL RECORD: ICD-10-PCS | Mod: CPTII,S$GLB,, | Performed by: INTERNAL MEDICINE

## 2023-06-14 PROCEDURE — 1101F PT FALLS ASSESS-DOCD LE1/YR: CPT | Mod: CPTII,S$GLB,, | Performed by: INTERNAL MEDICINE

## 2023-06-14 PROCEDURE — 99999 PR PBB SHADOW E&M-EST. PATIENT-LVL III: ICD-10-PCS | Mod: PBBFAC,,, | Performed by: INTERNAL MEDICINE

## 2023-06-14 PROCEDURE — 3078F PR MOST RECENT DIASTOLIC BLOOD PRESSURE < 80 MM HG: ICD-10-PCS | Mod: CPTII,S$GLB,, | Performed by: INTERNAL MEDICINE

## 2023-06-14 PROCEDURE — 1101F PR PT FALLS ASSESS DOC 0-1 FALLS W/OUT INJ PAST YR: ICD-10-PCS | Mod: CPTII,S$GLB,, | Performed by: INTERNAL MEDICINE

## 2023-06-14 PROCEDURE — 99215 PR OFFICE/OUTPT VISIT, EST, LEVL V, 40-54 MIN: ICD-10-PCS | Mod: S$GLB,,, | Performed by: INTERNAL MEDICINE

## 2023-06-14 PROCEDURE — 1160F PR REVIEW ALL MEDS BY PRESCRIBER/CLIN PHARMACIST DOCUMENTED: ICD-10-PCS | Mod: CPTII,S$GLB,, | Performed by: INTERNAL MEDICINE

## 2023-06-14 PROCEDURE — 1160F RVW MEDS BY RX/DR IN RCRD: CPT | Mod: CPTII,S$GLB,, | Performed by: INTERNAL MEDICINE

## 2023-06-14 PROCEDURE — 99215 OFFICE O/P EST HI 40 MIN: CPT | Mod: S$GLB,,, | Performed by: INTERNAL MEDICINE

## 2023-06-14 NOTE — TELEPHONE ENCOUNTER
Spoke to pt's son, alphonso. He stated that Dr. Gray had reviewed the pt's kidney results as well. Pt was recommended to increase water intake, and f/u scheduled for 2 weeks. Pt also advised not to go out to her garden as well.  I advised that the pt hold the metformin and follow a DM diet.   Alphonso stated that he will also monitor the pt's BS daily, and let us know if any changes.

## 2023-06-14 NOTE — TELEPHONE ENCOUNTER
Returned call to pt's son, Alphonso. No answer. Left message advising to hold metformin for now. Left recommendations and inquiries. Advised to call or send message to let us know.

## 2023-06-14 NOTE — TELEPHONE ENCOUNTER
Spoke to pt's son. He stated that he had received a message from Dr. Cabrales.  Please advise on metformin.

## 2023-06-14 NOTE — PROGRESS NOTES
Subjective:   Patient ID:  Zaida Liu is a 83 y.o. female who presents for follow-up of Hospital Follow Up      HPI: Since the last visit patient was hospitalized with another CVA (5/19/23) with expressive aphasia.    CT angio: occlusion of a posterior left M2 branch in the sylvian fissure and downstream M3 branches coursing superiorly and in the left sylvian fissure correlating to developing ischemia in the left parietal lobe.  Atherosclerotic changes  of the carotid bifurcations and proximal ICA's without hemodynamically significant stenosis. Goitrous thyroid glad.She underwent mechanical thrombectomy with marked improved in her symptoms. Following the procedure patient had ST segment changes on her ECG.  Cardiology consults deemed these demand ischemia. NO specific recommendations were given. Repeat CFD showed preserved LV function ( 65-70%), grade II DD, normally functioning AVR with a mean gradient 12 mmHg and negative bubble study.  PA pressure 39 mmHg, moderately increased LA size. ( 42.4 ml/m2).    Patient was discharged on decreased dose of Amlodipine and Hctz, Plavix and ASA and is presently doing well.She is undergoing speech therapy.    Reviewing her chart, there was no evidence of post AVR supraventricular arrhythmia.  The 30 day monitor post CVA last year was unremarkable.  There is a remote history of DVT post ?surgery (patient does not recall this).    She is scheduled for a follow up with neurology on the [facility]      Lab Results   Component Value Date     06/12/2023    K 4.3 06/12/2023     06/12/2023    CO2 28 06/12/2023    BUN 27 (H) 06/12/2023    CREATININE 1.6 (H) 06/12/2023     (H) 06/12/2023    HGBA1C 6.9 (H) 06/12/2023    MG 1.5 (L) 12/15/2015    AST 21 06/12/2023    ALT 18 06/12/2023    ALBUMIN 4.1 06/12/2023    PROT 7.1 06/12/2023    BILITOT 0.5 06/12/2023    WBC 7.58 03/01/2023    HGB 11.2 (L) 03/01/2023    HCT 34.7 (L) 03/01/2023    HCT 28 (L) 08/25/2015    MCV 93  "03/01/2023     03/01/2023    INR 1.0 12/15/2015    TSH 1.462 06/12/2023    CHOL 160 06/12/2023    HDL 51 06/12/2023    LDLCALC 77.8 06/12/2023    TRIG 156 (H) 06/12/2023       No results found in the last 24 hours.     Review of Systems   Constitutional: Negative.   HENT: Negative.     Eyes: Negative.    Cardiovascular: Negative.  Negative for chest pain, claudication, dyspnea on exertion, irregular heartbeat, leg swelling, near-syncope, palpitations and syncope.   Respiratory: Negative.  Negative for cough, shortness of breath, snoring and wheezing.    Endocrine: Negative.  Negative for cold intolerance, heat intolerance, polydipsia, polyphagia and polyuria.   Skin: Negative.    Musculoskeletal: Negative.    Gastrointestinal: Negative.    Genitourinary: Negative.    Neurological: Negative.    Psychiatric/Behavioral: Negative.       Objective:   Physical Exam  Vitals and nursing note reviewed.   Constitutional:       Appearance: Normal appearance. She is well-developed.      Comments: /62   Pulse 61   Ht 5' 5" (1.651 m)   Wt 78 kg (171 lb 13.6 oz)   BMI 28.60 kg/m²      HENT:      Head: Normocephalic.   Eyes:      Pupils: Pupils are equal, round, and reactive to light.   Neck:      Thyroid: No thyromegaly.      Vascular: No carotid bruit.   Cardiovascular:      Rate and Rhythm: Normal rate and regular rhythm.      Pulses: Intact distal pulses.           Carotid pulses are 2+ on the right side and 2+ on the left side.       Radial pulses are 2+ on the right side and 2+ on the left side.        Femoral pulses are 2+ on the right side and 2+ on the left side.       Popliteal pulses are 2+ on the right side and 2+ on the left side.        Dorsalis pedis pulses are 2+ on the right side and 2+ on the left side.        Posterior tibial pulses are 2+ on the right side and 2+ on the left side.      Heart sounds: Normal heart sounds. No murmur heard.    No friction rub. No gallop.   Pulmonary:      Effort: " Pulmonary effort is normal. No respiratory distress.      Breath sounds: Normal breath sounds. No wheezing or rales.   Chest:      Chest wall: No tenderness.   Abdominal:      Palpations: Abdomen is soft.   Musculoskeletal:         General: Normal range of motion.      Cervical back: Normal range of motion and neck supple.   Skin:     General: Skin is warm and dry.   Neurological:      Mental Status: She is alert.         Assessment and Plan:     Problem List Items Addressed This Visit          Cardiology Problems    Hypertension associated with diabetes    Relevant Orders    Hypertension Digital Medicine ([facility]) Enrollment Order (Completed)    Hypertension Digital Medicine ([facility]): Assign Onboarding Questionnaires (Completed)     Other Visit Diagnoses       Cerebrovascular accident (CVA), unspecified mechanism    -  Primary    Relevant Orders    Basic Metabolic Panel    Ambulatory referral/consult to Electrophysiology    Cardiolipin antibody    C-reactive protein    Lipoprotein A (LPA)    Antithrombin III    Protein S activity    Protein C activity    Homocysteine, serum    Factor 5 leiden    Hypertension Digital Medicine ([facility]) Enrollment Order (Completed)    Hypertension Digital Medicine ([facility]): Assign Onboarding Questionnaires (Completed)            Patient's Medications   New Prescriptions    No medications on file   Previous Medications    AMLODIPINE (NORVASC) 10 MG TABLET    TAKE 1 TABLET EVERY DAY    ASPIRIN (ECOTRIN) 81 MG EC TABLET    Take 81 mg by mouth once daily.    CLOPIDOGREL (PLAVIX) 75 MG TABLET    TAKE 1 TABLET ONE TIME DAILY    EZETIMIBE (ZETIA) 10 MG TABLET    Take 1 tablet (10 mg total) by mouth once daily.    FENOFIBRATE MICRONIZED (LOFIBRA) 134 MG CAP    TAKE 1 CAPSULE DAILY WITH BREAKFAST.    HYDROCHLOROTHIAZIDE (HYDRODIURIL) 25 MG TABLET    Take 0.5 tablets (12.5 mg total) by mouth once daily.    LOSARTAN (COZAAR) 100 MG TABLET    TAKE 1 TABLET DAILY.    METFORMIN (GLUCOPHAGE) 500 MG TABLET     Take 1 tablet (500 mg total) by mouth daily with breakfast.    METOPROLOL TARTRATE (LOPRESSOR) 25 MG TABLET    TAKE 1 TABLET TWICE DAILY    OMEPRAZOLE (PRILOSEC) 20 MG CAPSULE    Take 20 mg by mouth every morning.    ROSUVASTATIN (CRESTOR) 40 MG TAB    TAKE 1 TABLET (40 MG TOTAL) BY MOUTH EVERY EVENING.    VITAMIN D 1000 UNITS TAB    Take 1,000 Units by mouth once daily.   Modified Medications    No medications on file   Discontinued Medications    No medications on file     Two recent cardiac echoes showed preserved LV function with normally functioning bioprosthetic AVR.  She has atherosclerotic disease, but angiogram prior to AVR in 2015 showed only minimal irregularities in the coronary system and no significant epicardial disease.  CTA on the [facility] showed atherosclerotic disease of the aorta and pre cerebral vessels, but no obstructive disease.  I am concerned that the recurrent CVA may have been related to undetected so far atrial fibrillation.  I have discussed it with patient and her son.  Will consult EP for possible ILR to further monitor patient for arrhythmia. Until then continue on DAPT.  Due to remote history of DVT (appeared to be provoked) will also schedule hypercoagulable work up.  Continue on the present antihypertensive regimen as is.      No follow-ups on file.

## 2023-06-14 NOTE — TELEPHONE ENCOUNTER
Need to hold the metformin as the last lab done by Dr. Gray revealed a decrease in her kidney function  Just try diabetic diet for now, and monitor her BS  Have they noticed any change in pt, is she not hydrating??  Has she taken any NSAIDS??  Has she had any diarrhea that might dehydrate her or tried working in the garden and gotten dehydrated??    Received requests for refills HCTZ and metformin

## 2023-06-14 NOTE — TELEPHONE ENCOUNTER
----- Message from Payton Perez sent at 6/13/2023  4:47 PM CDT -----  Contact: [phone]  Pt son states that he was returning a call. Please call and advise. Thanks

## 2023-06-15 DIAGNOSIS — Z86.73 HISTORY OF CVA (CEREBROVASCULAR ACCIDENT): Primary | ICD-10-CM

## 2023-06-19 RX ORDER — AMLODIPINE BESYLATE 10 MG/1
TABLET ORAL
Qty: 90 TABLET | Refills: 3 | Status: SHIPPED | OUTPATIENT
Start: 2023-06-19 | End: 2023-10-02

## 2023-07-05 ENCOUNTER — PES CALL (OUTPATIENT)
Dept: ADMINISTRATIVE | Facility: CLINIC | Age: 83
End: 2023-07-05
Payer: MEDICARE

## 2023-07-11 ENCOUNTER — PATIENT MESSAGE (OUTPATIENT)
Dept: CARDIOLOGY | Facility: CLINIC | Age: 83
End: 2023-07-11
Payer: MEDICARE

## 2023-07-11 ENCOUNTER — OFFICE VISIT (OUTPATIENT)
Dept: ELECTROPHYSIOLOGY | Facility: CLINIC | Age: 83
End: 2023-07-11
Payer: MEDICARE

## 2023-07-11 ENCOUNTER — HOSPITAL ENCOUNTER (OUTPATIENT)
Dept: CARDIOLOGY | Facility: CLINIC | Age: 83
Discharge: HOME | End: 2023-07-11
Payer: MEDICARE

## 2023-07-11 VITALS
HEIGHT: 65 IN | HEART RATE: 61 BPM | WEIGHT: 175.94 LBS | DIASTOLIC BLOOD PRESSURE: 55 MMHG | SYSTOLIC BLOOD PRESSURE: 171 MMHG | BODY MASS INDEX: 29.31 KG/M2

## 2023-07-11 DIAGNOSIS — E11.29 TYPE 2 DIABETES MELLITUS WITH MICROALBUMINURIA, WITHOUT LONG-TERM CURRENT USE OF INSULIN: ICD-10-CM

## 2023-07-11 DIAGNOSIS — I63.9 CEREBROVASCULAR ACCIDENT (CVA), UNSPECIFIED MECHANISM: ICD-10-CM

## 2023-07-11 DIAGNOSIS — R80.9 TYPE 2 DIABETES MELLITUS WITH MICROALBUMINURIA, WITHOUT LONG-TERM CURRENT USE OF INSULIN: ICD-10-CM

## 2023-07-11 DIAGNOSIS — Z86.73 HISTORY OF CVA (CEREBROVASCULAR ACCIDENT): Primary | ICD-10-CM

## 2023-07-11 DIAGNOSIS — Z86.73 HISTORY OF CVA (CEREBROVASCULAR ACCIDENT): ICD-10-CM

## 2023-07-11 DIAGNOSIS — I70.0 AORTIC ATHEROSCLEROSIS: ICD-10-CM

## 2023-07-11 DIAGNOSIS — E11.59 HYPERTENSION ASSOCIATED WITH DIABETES: ICD-10-CM

## 2023-07-11 DIAGNOSIS — I15.2 HYPERTENSION ASSOCIATED WITH DIABETES: ICD-10-CM

## 2023-07-11 PROCEDURE — 1101F PT FALLS ASSESS-DOCD LE1/YR: CPT | Mod: CPTII,S$GLB,, | Performed by: INTERNAL MEDICINE

## 2023-07-11 PROCEDURE — 1101F PR PT FALLS ASSESS DOC 0-1 FALLS W/OUT INJ PAST YR: ICD-10-PCS | Mod: CPTII,S$GLB,, | Performed by: INTERNAL MEDICINE

## 2023-07-11 PROCEDURE — 1160F RVW MEDS BY RX/DR IN RCRD: CPT | Mod: CPTII,S$GLB,, | Performed by: INTERNAL MEDICINE

## 2023-07-11 PROCEDURE — 99999 PR PBB SHADOW E&M-EST. PATIENT-LVL III: CPT | Mod: PBBFAC,,, | Performed by: INTERNAL MEDICINE

## 2023-07-11 PROCEDURE — 3288F PR FALLS RISK ASSESSMENT DOCUMENTED: ICD-10-PCS | Mod: CPTII,S$GLB,, | Performed by: INTERNAL MEDICINE

## 2023-07-11 PROCEDURE — 93000 RHYTHM STRIP: ICD-10-PCS | Mod: S$GLB,,, | Performed by: INTERNAL MEDICINE

## 2023-07-11 PROCEDURE — 99215 OFFICE O/P EST HI 40 MIN: CPT | Mod: S$GLB,,, | Performed by: INTERNAL MEDICINE

## 2023-07-11 PROCEDURE — 1126F AMNT PAIN NOTED NONE PRSNT: CPT | Mod: CPTII,S$GLB,, | Performed by: INTERNAL MEDICINE

## 2023-07-11 PROCEDURE — 3077F SYST BP >= 140 MM HG: CPT | Mod: CPTII,S$GLB,, | Performed by: INTERNAL MEDICINE

## 2023-07-11 PROCEDURE — 1159F MED LIST DOCD IN RCRD: CPT | Mod: CPTII,S$GLB,, | Performed by: INTERNAL MEDICINE

## 2023-07-11 PROCEDURE — 3078F DIAST BP <80 MM HG: CPT | Mod: CPTII,S$GLB,, | Performed by: INTERNAL MEDICINE

## 2023-07-11 PROCEDURE — 99999 PR PBB SHADOW E&M-EST. PATIENT-LVL III: ICD-10-PCS | Mod: PBBFAC,,, | Performed by: INTERNAL MEDICINE

## 2023-07-11 PROCEDURE — 1159F PR MEDICATION LIST DOCUMENTED IN MEDICAL RECORD: ICD-10-PCS | Mod: CPTII,S$GLB,, | Performed by: INTERNAL MEDICINE

## 2023-07-11 PROCEDURE — 1126F PR PAIN SEVERITY QUANTIFIED, NO PAIN PRESENT: ICD-10-PCS | Mod: CPTII,S$GLB,, | Performed by: INTERNAL MEDICINE

## 2023-07-11 PROCEDURE — 1160F PR REVIEW ALL MEDS BY PRESCRIBER/CLIN PHARMACIST DOCUMENTED: ICD-10-PCS | Mod: CPTII,S$GLB,, | Performed by: INTERNAL MEDICINE

## 2023-07-11 PROCEDURE — 3288F FALL RISK ASSESSMENT DOCD: CPT | Mod: CPTII,S$GLB,, | Performed by: INTERNAL MEDICINE

## 2023-07-11 PROCEDURE — 3078F PR MOST RECENT DIASTOLIC BLOOD PRESSURE < 80 MM HG: ICD-10-PCS | Mod: CPTII,S$GLB,, | Performed by: INTERNAL MEDICINE

## 2023-07-11 PROCEDURE — 3077F PR MOST RECENT SYSTOLIC BLOOD PRESSURE >= 140 MM HG: ICD-10-PCS | Mod: CPTII,S$GLB,, | Performed by: INTERNAL MEDICINE

## 2023-07-11 PROCEDURE — 99215 PR OFFICE/OUTPT VISIT, EST, LEVL V, 40-54 MIN: ICD-10-PCS | Mod: S$GLB,,, | Performed by: INTERNAL MEDICINE

## 2023-07-11 PROCEDURE — 93000 ELECTROCARDIOGRAM COMPLETE: CPT | Mod: S$GLB,,, | Performed by: INTERNAL MEDICINE

## 2023-07-11 NOTE — H&P (VIEW-ONLY)
Subjective:   Patient ID:  Zaida Liu is a 83 y.o. female who presents for evaluation of Cryptogenic stroke    Referring Cardiologist: Cristela Gray MD  Primary Care Physician: Karina Wilson MD    HPI  I had the pleasure of seeing Mrs. Liu today in our electrophysiology clinic in consultation for her cryptogenic stroke. As you are aware she is a pleasant 83 year-old woman with bicuspid AV s/p bioprosthetic AVR in 2015, hypertension, type 2 diabetes mellitus, aortic atherosclerotic disease, and cryptogenic stroke. Her first stroke occurred in August of 2022. She had a recurrent stroke in May of 2023. This was an embolic left MCA stroke treated with thrombectomy at [facility]. A 30 day monitor from January of 2023 noted no AF. She is referred for ILR.    I reviewed all available ECGs which show sinus rhythm.    Review of Systems   Constitutional: Negative for fever and malaise/fatigue.   HENT:  Negative for congestion and sore throat.    Eyes:  Negative for blurred vision and visual disturbance.   Cardiovascular:  Negative for chest pain, dyspnea on exertion, irregular heartbeat, near-syncope, palpitations and syncope.   Respiratory:  Negative for cough and shortness of breath.    Hematologic/Lymphatic: Negative for bleeding problem. Does not bruise/bleed easily.   Skin: Negative.    Musculoskeletal: Negative.    Gastrointestinal:  Negative for bloating, abdominal pain, hematochezia and melena.   Neurological:  Negative for focal weakness and weakness.   Psychiatric/Behavioral: Negative.       Objective:   Physical Exam  Vitals reviewed.   Constitutional:       General: She is not in acute distress.     Appearance: She is well-developed. She is not diaphoretic.   HENT:      Head: Normocephalic and atraumatic.   Eyes:      General:         Right eye: No discharge.         Left eye: No discharge.      Conjunctiva/sclera: Conjunctivae normal.   Cardiovascular:      Rate and Rhythm: Normal rate and  regular rhythm.      Heart sounds: No murmur heard.    No friction rub. No gallop.   Pulmonary:      Effort: Pulmonary effort is normal. No respiratory distress.      Breath sounds: Normal breath sounds. No wheezing or rales.   Abdominal:      General: Bowel sounds are normal. There is no distension.      Palpations: Abdomen is soft.      Tenderness: There is no abdominal tenderness.   Musculoskeletal:      Cervical back: Neck supple.   Skin:     General: Skin is warm and dry.   Neurological:      Mental Status: She is alert and oriented to person, place, and time.   Psychiatric:         Behavior: Behavior normal.         Thought Content: Thought content normal.         Judgment: Judgment normal.       Assessment:      1. History of CVA (cerebrovascular accident)    2. Hypertension associated with diabetes    3. Aortic atherosclerosis    4. Type 2 diabetes mellitus with microalbuminuria, without long-term current use of insulin    5. Cerebrovascular accident (CVA), unspecified mechanism        Plan:   In summary, Mrs. Liu is a pleasant 83 year-old woman with bicuspid AV s/p bioprosthetic AVR in 2015, hypertension, type 2 diabetes mellitus, aortic atherosclerotic disease, and embolic cryptogenic stroke. A 30 day event monitor showed no AF. We discussed the potential etiology of undiagnosed atrial arrhythmias (atrial fibrillation, atrial flutter) as possible culprits for recurrent strokes. We discussed how stroke prevention strategies are different in the setting of atrial fibrillation and flutter with use of anticoagulation. To date no atrial arrhythmias have been observed however she has risk factors for atrial fibrillation. Discussed the benefit of long-term heart rhythm monitoring with an implantable loop recorder. Discussed the risks of implantation including pain, infection, bleeding and rare risk of device erosion. She understood and elects to proceed. Consent signed.    Plan  ILR, [facility]    Thank you  for allowing me to participate in the care of this patient. Please do not hesitate to call me with any questions or concerns.    Harsh Araujo MD, PhD  Cardiac Electrophysiology

## 2023-07-11 NOTE — PROGRESS NOTES
Subjective:   Patient ID:  Zaida Liu is a 83 y.o. female who presents for evaluation of Cryptogenic stroke    Referring Cardiologist: Cristela Gray MD  Primary Care Physician: Karina Wilson MD    HPI  I had the pleasure of seeing Mrs. Liu today in our electrophysiology clinic in consultation for her cryptogenic stroke. As you are aware she is a pleasant 83 year-old woman with bicuspid AV s/p bioprosthetic AVR in 2015, hypertension, type 2 diabetes mellitus, aortic atherosclerotic disease, and cryptogenic stroke. Her first stroke occurred in August of 2022. She had a recurrent stroke in May of 2023. This was an embolic left MCA stroke treated with thrombectomy at [facility] [place]. A 30 day monitor from January of 2023 noted no AF. She is referred for ILR.    I reviewed all available ECGs which show sinus rhythm.    Review of Systems   Constitutional: Negative for fever and malaise/fatigue.   HENT:  Negative for congestion and sore throat.    Eyes:  Negative for blurred vision and visual disturbance.   Cardiovascular:  Negative for chest pain, dyspnea on exertion, irregular heartbeat, near-syncope, palpitations and syncope.   Respiratory:  Negative for cough and shortness of breath.    Hematologic/Lymphatic: Negative for bleeding problem. Does not bruise/bleed easily.   Skin: Negative.    Musculoskeletal: Negative.    Gastrointestinal:  Negative for bloating, abdominal pain, hematochezia and melena.   Neurological:  Negative for focal weakness and weakness.   Psychiatric/Behavioral: Negative.       Objective:   Physical Exam  Vitals reviewed.   Constitutional:       General: She is not in acute distress.     Appearance: She is well-developed. She is not diaphoretic.   HENT:      Head: Normocephalic and atraumatic.   Eyes:      General:         Right eye: No discharge.         Left eye: No discharge.      Conjunctiva/sclera: Conjunctivae normal.   Cardiovascular:      Rate and Rhythm: Normal rate and  regular rhythm.      Heart sounds: No murmur heard.    No friction rub. No gallop.   Pulmonary:      Effort: Pulmonary effort is normal. No respiratory distress.      Breath sounds: Normal breath sounds. No wheezing or rales.   Abdominal:      General: Bowel sounds are normal. There is no distension.      Palpations: Abdomen is soft.      Tenderness: There is no abdominal tenderness.   Musculoskeletal:      Cervical back: Neck supple.   Skin:     General: Skin is warm and dry.   Neurological:      Mental Status: She is alert and oriented to person, place, and time.   Psychiatric:         Behavior: Behavior normal.         Thought Content: Thought content normal.         Judgment: Judgment normal.       Assessment:      1. History of CVA (cerebrovascular accident)    2. Hypertension associated with diabetes    3. Aortic atherosclerosis    4. Type 2 diabetes mellitus with microalbuminuria, without long-term current use of insulin    5. Cerebrovascular accident (CVA), unspecified mechanism        Plan:   In summary, Mrs. Liu is a pleasant 83 year-old woman with bicuspid AV s/p bioprosthetic AVR in 2015, hypertension, type 2 diabetes mellitus, aortic atherosclerotic disease, and embolic cryptogenic stroke. A 30 day event monitor showed no AF. We discussed the potential etiology of undiagnosed atrial arrhythmias (atrial fibrillation, atrial flutter) as possible culprits for recurrent strokes. We discussed how stroke prevention strategies are different in the setting of atrial fibrillation and flutter with use of anticoagulation. To date no atrial arrhythmias have been observed however she has risk factors for atrial fibrillation. Discussed the benefit of long-term heart rhythm monitoring with an implantable loop recorder. Discussed the risks of implantation including pain, infection, bleeding and rare risk of device erosion. She understood and elects to proceed. Consent signed.    Plan  ILR, [facility]    Thank you  for allowing me to participate in the care of this patient. Please do not hesitate to call me with any questions or concerns.    Harhs Araujo MD, PhD  Cardiac Electrophysiology

## 2023-07-11 NOTE — TELEPHONE ENCOUNTER
Ms. Noe, I have sent you a massage that the blood work was fine. I hope your received it several days ago. All the blood work that we sent out to see if there are issues to cause abnormal clotting was normal.  I am glad you agreed to have monitor implanted so we can continue to monitor your heart rhythm.

## 2023-07-12 DIAGNOSIS — I63.9 CRYPTOGENIC STROKE: Primary | ICD-10-CM

## 2023-07-12 NOTE — TELEPHONE ENCOUNTER
Thank you for the note Alphonso,    Yes, Ms Liu's lp(a) was elevated but with her history this was expected and she is on recommended therapy .Therefore I did not comment on it. Currently Ms. Liu is on Rosuvastatin and Zetia.  We aim to keep LDL-C <70.  Hers was below 70 except for the last one that was barely over the threshold (76).      Niacin is not commonly used to treat lp(a), but it can lower its level. It does not however alter the increased risk of further cardiovascular events ( (heart attack, stroke) and it does have side effects .     I think it is worth trying and repeat lipids and LFT's in 2 months.      Another, option is to add injectable medication that is given 2 times a month in addition to already taken meds.  I would only entertain this possibility if her next LDL-C is above 70.

## 2023-07-13 ENCOUNTER — PATIENT MESSAGE (OUTPATIENT)
Dept: CARDIOLOGY | Facility: CLINIC | Age: 83
End: 2023-07-13
Payer: MEDICARE

## 2023-07-28 ENCOUNTER — TELEPHONE (OUTPATIENT)
Dept: ELECTROPHYSIOLOGY | Facility: CLINIC | Age: 83
End: 2023-07-28
Payer: MEDICARE

## 2023-07-28 NOTE — TELEPHONE ENCOUNTER
Spoke to Patient.    CONFIRMED procedure arrival time of 8:30 am on 7/31/2023 for ILR implant with Dr Araujo.    Reiterated instructions including:    -Directions to check in desk      -Confirmed absence or presence of implanted device/stimulator N/A    -Confirmed no recent or current fever, cough, or shortness of breath .    -Confirmed no redness, rash, irritation, or yeast infection to skin/ chest area.     -Bathe night prior and morning prior to procedure with Hibiclens solution or an antibacterial soap  -Reviewed current visitor policy    Patient verbalized understanding of above, denies any further questions and appreciated the call.

## 2023-07-30 PROBLEM — I63.9 CRYPTOGENIC STROKE: Status: ACTIVE | Noted: 2023-07-30

## 2023-07-30 NOTE — ASSESSMENT & PLAN NOTE
S/p ILR implant  BP elevated despite taking medications this morning-- D/c lopressor-- start Carvedilol  Device clinic on 8/10  F/u with NP in 3 months

## 2023-07-30 NOTE — HPI
Zaida Liu 83 y.o. female presents for ILR implant for crytogenic stroke.  Patient has a history of bicuspid AV s/p bioprosthetic AVR in 2015, hypertension, type 2 diabetes mellitus, aortic atherosclerotic disease, and cryptogenic stroke. Her first stroke occurred in August of 2022. She had a recurrent stroke in May of 2023. This was an embolic left MCA stroke treated with thrombectomy at [facility]. A 30 day monitor from January of 2023 noted no AF.    Not taking OACs.

## 2023-07-31 ENCOUNTER — HOSPITAL ENCOUNTER (OUTPATIENT)
Facility: HOSPITAL | Age: 83
Discharge: HOME | End: 2023-07-31
Attending: INTERNAL MEDICINE | Admitting: INTERNAL MEDICINE
Payer: MEDICARE

## 2023-07-31 VITALS
TEMPERATURE: 99 F | BODY MASS INDEX: 27.64 KG/M2 | SYSTOLIC BLOOD PRESSURE: 181 MMHG | WEIGHT: 172 LBS | OXYGEN SATURATION: 98 % | HEART RATE: 67 BPM | DIASTOLIC BLOOD PRESSURE: 78 MMHG | HEIGHT: 66 IN | RESPIRATION RATE: 18 BRPM

## 2023-07-31 DIAGNOSIS — Z95.9 CARDIAC DEVICE IN SITU: ICD-10-CM

## 2023-07-31 DIAGNOSIS — I63.9 CRYPTOGENIC STROKE: Primary | ICD-10-CM

## 2023-07-31 LAB — POCT GLUCOSE: 196 MG/DL (ref 70–110)

## 2023-07-31 PROCEDURE — 33285 INSJ SUBQ CAR RHYTHM MNTR: CPT | Mod: HCNC | Performed by: INTERNAL MEDICINE

## 2023-07-31 PROCEDURE — 63600175 PHARM REV CODE 636 W HCPCS: Mod: HCNC | Performed by: INTERNAL MEDICINE

## 2023-07-31 PROCEDURE — 93010 EKG 12-LEAD: ICD-10-PCS | Mod: HCNC,,, | Performed by: INTERNAL MEDICINE

## 2023-07-31 PROCEDURE — 25000003 PHARM REV CODE 250: Mod: HCNC | Performed by: INTERNAL MEDICINE

## 2023-07-31 PROCEDURE — 33285 PR INSERTION,SUBQ CARDIAC RHYTHM MONITOR, W/PRGRMG: ICD-10-PCS | Mod: HCNC,,, | Performed by: INTERNAL MEDICINE

## 2023-07-31 PROCEDURE — 82962 GLUCOSE BLOOD TEST: CPT | Mod: HCNC | Performed by: INTERNAL MEDICINE

## 2023-07-31 PROCEDURE — 93010 ELECTROCARDIOGRAM REPORT: CPT | Mod: HCNC,,, | Performed by: INTERNAL MEDICINE

## 2023-07-31 PROCEDURE — 93005 ELECTROCARDIOGRAM TRACING: CPT | Mod: 59,HCNC

## 2023-07-31 PROCEDURE — C1764 EVENT RECORDER, CARDIAC: HCPCS | Mod: HCNC | Performed by: INTERNAL MEDICINE

## 2023-07-31 PROCEDURE — 33285 INSJ SUBQ CAR RHYTHM MNTR: CPT | Mod: HCNC,,, | Performed by: INTERNAL MEDICINE

## 2023-07-31 DEVICE — LUX-DX™ INSERTABLE CARDIAC MONITOR
Type: IMPLANTABLE DEVICE | Site: CHEST  WALL | Status: FUNCTIONAL
Brand: LUX-DX™ INSERTABLE CARDIAC MONITOR

## 2023-07-31 RX ORDER — CARVEDILOL 6.25 MG/1
6.25 TABLET ORAL 2 TIMES DAILY WITH MEALS
Qty: 60 TABLET | Refills: 4 | Status: SHIPPED | OUTPATIENT
Start: 2023-07-31 | End: 2023-09-19 | Stop reason: SDUPTHER

## 2023-07-31 RX ORDER — CEFAZOLIN SODIUM 1 G/3ML
INJECTION, POWDER, FOR SOLUTION INTRAMUSCULAR; INTRAVENOUS
Status: DISCONTINUED | OUTPATIENT
Start: 2023-07-31 | End: 2023-07-31 | Stop reason: HOSPADM

## 2023-07-31 RX ORDER — LIDOCAINE HYDROCHLORIDE AND EPINEPHRINE 10; 10 MG/ML; UG/ML
INJECTION, SOLUTION INFILTRATION; PERINEURAL
Status: DISCONTINUED | OUTPATIENT
Start: 2023-07-31 | End: 2023-07-31 | Stop reason: HOSPADM

## 2023-07-31 NOTE — HOSPITAL COURSE
S/p ILR insertion.  Patient tolerated procedure.  Dressing intact.  D/c'd home with instructions.

## 2023-07-31 NOTE — INTERVAL H&P NOTE
The patient has been examined and the H&P has been reviewed:    I concur with the findings and no changes have occurred since H&P was written.  Zaida Liu 83 y.o. female presents for ILR implant for crytogenic stroke.  Patient has a history of bicuspid AV s/p bioprosthetic AVR in 2015, hypertension, type 2 diabetes mellitus, aortic atherosclerotic disease, and cryptogenic stroke. Her first stroke occurred in August of 2022. She had a recurrent stroke in May of 2023. This was an embolic left MCA stroke [place] [facility] A 30 day monitor from January of 2023 noted no AF.    Not taking OACs.     Procedure ILR insertion: risks, benefits and alternative options discussed and understood by patient/family.    AAO x 3  Cardiac: regular rate and rhythm.      EKG: NSR; 62 bpm     Active Hospital Problems    Diagnosis  POA    *Cryptogenic stroke [I63.9]  Yes     Patient is here for ILR implant    The indication(s), risks, benefits and alternatives of the procedure were explained to the patient, patient's family and/or surrogate decision maker. Risks include (but not limited to) bleeding, pain, infection. All questions were answered. Patient is understanding of these risks, and wishes to proceed. Consents signed.        Resolved Hospital Problems   No resolved problems to display.

## 2023-07-31 NOTE — DISCHARGE INSTRUCTIONS
Post-Procedure Patient Discharge Instructions  Loop Recorders     Wound Care  You are discharged with a standard dressing over the incision, you may remove the dressing after 24 hours.   There is Dermabond (clear glue) over your incision, do not scrub it off. It acts as a barrier and will eventually disappear.  Do not get the incision wet for 48 hours following the procedure. You may sponge bath during this period, working around the incision. After 48 hours, you may shower, but you should still try to keep this area as dry as possible, and avoid direct water contact to the incision (allow the water to hit back of your shoulder rather than directly on the incision). Gently pat the incision dry if it does get wet.  You may take regular showers after 2 weeks, unless otherwise indicated at your follow-up visit.  Do not submerge the incision in a tub, pool, or body of water for 6 weeks.  If you notice unusual swelling, redness, drainage, have more device site pain, chest pain, shortness of breath, or have a fever greater than 100 degrees, call our device clinic immediately: [phone] or [phone] during normal office hours. You may call [phone] after-hours or on weekends and ask for the electrophysiologist on call.    Activity   If you were driving prior to the procedure, you may resume driving right after your procedure (as long you did not receive any sedation). If you have a history of passing out or a history of certain arrhythmias, there may be driving restrictions unrelated to the procedure. Please clarify with your physician if this is the case.  Avoid rough contact at the device site for 6 weeks.  You may participate in sexual activity unless otherwise instructed.  You may return to work the follow day unless told otherwise by your provider.    Long-Term Instructions  Metal Detectors at Airports: Metal detectors at airports do not interfere with you loop recorder.  MRI: You may have an MRI  with an implantable loop recorder. All that is required is that you send a transmission to download your information before and after you have your MRI.    Long-Term Follow-Up  Your device has the ability to transmit device information from home to the doctors office using a home monitoring system.  This remote system takes the place of a doctors visit. Your device will be checked from home every 31 days. Every 12 months, you will be asked to come into the office for an in-office check.  Your device should last in the range of 3 years.         Any need to reschedule or cancel procedures, or any questions regarding your procedures should be addressed directly with the Arrhythmia Department Nurses at the following phone number: [phone].

## 2023-07-31 NOTE — NURSING
Received report from ESTRELLA Stanford. Patient s/p Loop recorder insertion, AAOx3. VSS, no c/o pain or discomfort at this time, resp even and unlabored. Dermabond to left chest is CDI. No active bleeding. No hematoma noted. Post procedure protocol reviewed with patient and patient's daughter. Understanding verbalized. Family members at bedside. Nurse call bell within reach.

## 2023-07-31 NOTE — DISCHARGE SUMMARY
[facility] - Cardiology  Cardiac Electrophysiology  Discharge Summary      Patient Name: Zaida Liu  MRN: [id number]  Admission Date: 7/31/2023  Hospital Length of Stay: 0 days  Discharge Date and Time: 7/31/2023 11:34 AM  Attending Physician: Harsh Araujo MD    Discharging Provider: Anette Figueroa NP  Primary Care Physician: Jazmin Wilson MD    HPI:   Zaida Liu 83 y.o. female presents for ILR implant for crytogenic stroke.  Patient has a history of bicuspid AV s/p bioprosthetic AVR in 2015, hypertension, type 2 diabetes mellitus, aortic atherosclerotic disease, and cryptogenic stroke. Her first stroke occurred in August of 2022. She had a recurrent stroke in May of 2023. This was an embolic left MCA stroke treated with thrombectomy at [facility]. A 30 day monitor from January of 2023 noted no AF.    Not taking OACs.       Procedure(s) (LRB):  Insertion, Implantable Loop Recorder (Left)     Electrophysiology Procedure    Result Date: 7/31/2023    Successful implantation of Loop Recorder. I certify that I was present for the critical steps of the procedure including the diagnostic, surgical and/or interventional portions. Procedure Log documented by No documenter listed and verified by Harsh Araujo MD. Date: 7/31/2023  Time: 11:28 AM      Hospital Course:  S/p ILR insertion.  Patient tolerated procedure.  Dressing intact.  D/c'd home with instructions.        Goals of Care Treatment Preferences:  Code Status: Full Code      Consults: None    Significant Diagnostic Studies: Cardiac Graphics: ECG: Pre-procedure: NSR: 62 bpm    Pending Diagnostic Studies:       None            Final Active Diagnoses:    Diagnosis Date Noted POA    PRINCIPAL PROBLEM:  Cryptogenic stroke [I63.9] 07/30/2023 Yes      Problems Resolved During this Admission:     Neuro  * Cryptogenic stroke  S/p ILR implant  BP elevated despite taking medications this morning-- D/c lopressor-- start Carvedilol  Device clinic on 8/10  F/u  with NP in 3 months    Discharged Condition: stable    Disposition: Home or Self Care    Follow Up:   Follow-up Information       DEVICE CHECK CLINIC. Go on 8/10/2023.    Why: post ILR insertion, Wound check             Promise Urban NP. Schedule an appointment as soon as possible for a visit in 3 month(s).    Specialty: Cardiology  Why: post ILR insertion  Contact information:  [address]  [facility] [address]  [phone]                           Patient Instructions:      Diet Cardiac     Ice to affected area   Order Comments: X 24 - 48 hours as needed for comfort     Remove dressing in 48 hours   Order Comments: May shower after dressing removed with shower spray to back.  Do not submerge in pool or tub.     Notify your health care provider if you experience any of the following:  temperature >100.4     Notify your health care provider if you experience any of the following:  persistent nausea and vomiting or diarrhea     Notify your health care provider if you experience any of the following:  severe uncontrolled pain     Notify your health care provider if you experience any of the following:  redness, tenderness, or signs of infection (pain, swelling, redness, odor or green/yellow discharge around incision site)     Notify your health care provider if you experience any of the following:  difficulty breathing or increased cough     Notify your health care provider if you experience any of the following:  severe persistent headache     Notify your health care provider if you experience any of the following:  worsening rash     Notify your health care provider if you experience any of the following:  persistent dizziness, light-headedness, or visual disturbances     Notify your health care provider if you experience any of the following:  increased confusion or weakness     Activity as tolerated     Shower on day dressing removed (No bath)     Medications:  Reconciled Home Medications:       Medication List        START taking these medications      carvediloL 6.25 MG tablet  Commonly known as: COREG  Take 1 tablet (6.25 mg total) by mouth 2 (two) times daily with meals.            CONTINUE taking these medications      amLODIPine 10 MG tablet  Commonly known as: NORVASC  TAKE 1 TABLET EVERY DAY     aspirin 81 MG EC tablet  Commonly known as: ECOTRIN  Take 81 mg by mouth once daily.     clopidogreL 75 mg tablet  Commonly known as: PLAVIX  TAKE 1 TABLET ONE TIME DAILY     ezetimibe 10 mg tablet  Commonly known as: ZETIA  Take 1 tablet (10 mg total) by mouth once daily.     fenofibrate micronized 134 MG Cap  Commonly known as: LOFIBRA  TAKE 1 CAPSULE DAILY WITH BREAKFAST.     hydroCHLOROthiazide 25 MG tablet  Commonly known as: HYDRODIURIL  Take 0.5 tablets (12.5 mg total) by mouth once daily.     losartan 100 MG tablet  Commonly known as: COZAAR  TAKE 1 TABLET DAILY.     metFORMIN 500 MG tablet  Commonly known as: GLUCOPHAGE  Take 1 tablet (500 mg total) by mouth daily with breakfast.     omeprazole 20 MG capsule  Commonly known as: PRILOSEC  Take 20 mg by mouth every morning.     rosuvastatin 40 MG Tab  Commonly known as: CRESTOR  TAKE 1 TABLET (40 MG TOTAL) BY MOUTH EVERY EVENING.     vitamin D 1000 units Tab  Commonly known as: VITAMIN D3  Take 1,000 Units by mouth once daily.            STOP taking these medications      metoprolol tartrate 25 MG tablet  Commonly known as: LOPRESSOR              Time spent on the discharge of patient: 20 minutes    Anette Figueroa NP  Cardiac Electrophysiology  [facility] - Cardiology

## 2023-07-31 NOTE — NURSING
Patient discharged per MD orders. Instructions given on medications, wound care, activity, signs of infection, when to call MD, and follow up appointments. Pt and daughter verbalized understanding. PIV removed. Patient ambulated off of unit; accompanied by daughter. Pt refused wheelchair.

## 2023-07-31 NOTE — NURSING
Pt ambulated around unit and to restroom. Pt voided. Tolerated walk well. No c/o pain or discomfort at this time, resp even and unlabored. Mepilex dressing to left chest site is CDI. No active bleeding. No hematoma noted.

## 2023-08-01 ENCOUNTER — PATIENT MESSAGE (OUTPATIENT)
Dept: CARDIOLOGY | Facility: CLINIC | Age: 83
End: 2023-08-01
Payer: MEDICARE

## 2023-08-01 NOTE — TELEPHONE ENCOUNTER
Alphonso, Thank you for letting me know, BP of 130-140 systolic is still elevated and I agree with Dr. Araujo. If after monitoring her BP is low ( <110) please let  men know. Glad all went well.

## 2023-08-03 DIAGNOSIS — I63.9 CRYPTOGENIC STROKE: Primary | ICD-10-CM

## 2023-08-10 ENCOUNTER — CLINICAL SUPPORT (OUTPATIENT)
Dept: CARDIOLOGY | Facility: HOSPITAL | Age: 83
End: 2023-08-10
Attending: INTERNAL MEDICINE
Payer: MEDICARE

## 2023-08-10 DIAGNOSIS — I63.9 CRYPTOGENIC STROKE: ICD-10-CM

## 2023-08-10 PROCEDURE — 93285 PRGRMG DEV EVAL SCRMS IP: CPT | Mod: 26,HCNC,, | Performed by: INTERNAL MEDICINE

## 2023-08-10 PROCEDURE — 93285 CARDIAC DEVICE CHECK - IN CLINIC & HOSPITAL: ICD-10-PCS | Mod: 26,HCNC,, | Performed by: INTERNAL MEDICINE

## 2023-09-05 ENCOUNTER — TELEPHONE (OUTPATIENT)
Dept: INTERNAL MEDICINE | Facility: CLINIC | Age: 83
End: 2023-09-05
Payer: MEDICARE

## 2023-09-05 DIAGNOSIS — E11.59 HYPERTENSION ASSOCIATED WITH DIABETES: ICD-10-CM

## 2023-09-05 DIAGNOSIS — E11.69 HYPERLIPIDEMIA ASSOCIATED WITH TYPE 2 DIABETES MELLITUS: ICD-10-CM

## 2023-09-05 DIAGNOSIS — E78.5 HYPERLIPIDEMIA ASSOCIATED WITH TYPE 2 DIABETES MELLITUS: ICD-10-CM

## 2023-09-05 DIAGNOSIS — E11.29 TYPE 2 DIABETES MELLITUS WITH MICROALBUMINURIA, WITHOUT LONG-TERM CURRENT USE OF INSULIN: Primary | ICD-10-CM

## 2023-09-05 DIAGNOSIS — I15.2 HYPERTENSION ASSOCIATED WITH DIABETES: ICD-10-CM

## 2023-09-05 DIAGNOSIS — R80.9 TYPE 2 DIABETES MELLITUS WITH MICROALBUMINURIA, WITHOUT LONG-TERM CURRENT USE OF INSULIN: Primary | ICD-10-CM

## 2023-09-05 NOTE — TELEPHONE ENCOUNTER
----- Message from Alfonzo Cornejo sent at 9/5/2023  2:13 PM CDT -----  Regarding: blood work  Good afternoon patient is schedule to come in tomorrow for blood work patient does not have any orders in active request please add orders to patients chart

## 2023-09-06 ENCOUNTER — LAB VISIT (OUTPATIENT)
Dept: LAB | Facility: HOSPITAL | Age: 83
End: 2023-09-06
Payer: MEDICARE

## 2023-09-06 DIAGNOSIS — R80.9 TYPE 2 DIABETES MELLITUS WITH MICROALBUMINURIA, WITHOUT LONG-TERM CURRENT USE OF INSULIN: ICD-10-CM

## 2023-09-06 DIAGNOSIS — E78.5 HYPERLIPIDEMIA ASSOCIATED WITH TYPE 2 DIABETES MELLITUS: ICD-10-CM

## 2023-09-06 DIAGNOSIS — E11.29 TYPE 2 DIABETES MELLITUS WITH MICROALBUMINURIA, WITHOUT LONG-TERM CURRENT USE OF INSULIN: ICD-10-CM

## 2023-09-06 DIAGNOSIS — E11.59 HYPERTENSION ASSOCIATED WITH DIABETES: ICD-10-CM

## 2023-09-06 DIAGNOSIS — I15.2 HYPERTENSION ASSOCIATED WITH DIABETES: ICD-10-CM

## 2023-09-06 DIAGNOSIS — E11.69 HYPERLIPIDEMIA ASSOCIATED WITH TYPE 2 DIABETES MELLITUS: ICD-10-CM

## 2023-09-06 LAB
ALBUMIN SERPL BCP-MCNC: 3.8 G/DL (ref 3.5–5.2)
ALP SERPL-CCNC: 44 U/L (ref 55–135)
ALT SERPL W/O P-5'-P-CCNC: 16 U/L (ref 10–44)
ANION GAP SERPL CALC-SCNC: 12 MMOL/L (ref 8–16)
AST SERPL-CCNC: 18 U/L (ref 10–40)
BILIRUB SERPL-MCNC: 0.4 MG/DL (ref 0.1–1)
BUN SERPL-MCNC: 30 MG/DL (ref 8–23)
CALCIUM SERPL-MCNC: 9.5 MG/DL (ref 8.7–10.5)
CHLORIDE SERPL-SCNC: 105 MMOL/L (ref 95–110)
CHOLEST SERPL-MCNC: 147 MG/DL (ref 120–199)
CHOLEST/HDLC SERPL: 3.3 {RATIO} (ref 2–5)
CO2 SERPL-SCNC: 23 MMOL/L (ref 23–29)
CREAT SERPL-MCNC: 1.2 MG/DL (ref 0.5–1.4)
EST. GFR  (NO RACE VARIABLE): 45.2 ML/MIN/1.73 M^2
ESTIMATED AVG GLUCOSE: 186 MG/DL (ref 68–131)
GLUCOSE SERPL-MCNC: 161 MG/DL (ref 70–110)
HBA1C MFR BLD: 8.1 % (ref 4–5.6)
HDLC SERPL-MCNC: 45 MG/DL (ref 40–75)
HDLC SERPL: 30.6 % (ref 20–50)
LDLC SERPL CALC-MCNC: 80.8 MG/DL (ref 63–159)
NONHDLC SERPL-MCNC: 102 MG/DL
POTASSIUM SERPL-SCNC: 4.6 MMOL/L (ref 3.5–5.1)
PROT SERPL-MCNC: 6.5 G/DL (ref 6–8.4)
SODIUM SERPL-SCNC: 140 MMOL/L (ref 136–145)
TRIGL SERPL-MCNC: 106 MG/DL (ref 30–150)

## 2023-09-06 PROCEDURE — 83036 HEMOGLOBIN GLYCOSYLATED A1C: CPT | Mod: HCNC | Performed by: INTERNAL MEDICINE

## 2023-09-06 PROCEDURE — 36415 COLL VENOUS BLD VENIPUNCTURE: CPT | Mod: HCNC,PO | Performed by: INTERNAL MEDICINE

## 2023-09-06 PROCEDURE — 80061 LIPID PANEL: CPT | Mod: HCNC | Performed by: INTERNAL MEDICINE

## 2023-09-06 PROCEDURE — 80053 COMPREHEN METABOLIC PANEL: CPT | Mod: HCNC | Performed by: INTERNAL MEDICINE

## 2023-09-07 ENCOUNTER — TELEPHONE (OUTPATIENT)
Dept: INTERNAL MEDICINE | Facility: CLINIC | Age: 83
End: 2023-09-07
Payer: MEDICARE

## 2023-09-07 NOTE — TELEPHONE ENCOUNTER
----- Message from Karina Wilson MD sent at 9/7/2023  3:25 PM CDT -----  Please review your lab work and we will discuss at your pending office visit.   valentín

## 2023-09-09 RX ORDER — HYDROCHLOROTHIAZIDE 25 MG/1
25 TABLET ORAL
Qty: 90 TABLET | Refills: 3 | Status: SHIPPED | OUTPATIENT
Start: 2023-09-09

## 2023-09-13 ENCOUNTER — CLINICAL SUPPORT (OUTPATIENT)
Dept: CARDIOLOGY | Facility: HOSPITAL | Age: 83
End: 2023-09-13
Payer: MEDICARE

## 2023-09-13 DIAGNOSIS — Z95.818 PRESENCE OF OTHER CARDIAC IMPLANTS AND GRAFTS: ICD-10-CM

## 2023-09-13 PROCEDURE — 93298 CARDIAC DEVICE CHECK - REMOTE: ICD-10-PCS | Mod: HCNC,,, | Performed by: INTERNAL MEDICINE

## 2023-09-13 PROCEDURE — G2066 INTER DEVC REMOTE 30D: HCPCS | Mod: HCNC | Performed by: INTERNAL MEDICINE

## 2023-09-13 PROCEDURE — 93298 REM INTERROG DEV EVAL SCRMS: CPT | Mod: HCNC,,, | Performed by: INTERNAL MEDICINE

## 2023-09-17 ENCOUNTER — PATIENT MESSAGE (OUTPATIENT)
Dept: INTERNAL MEDICINE | Facility: CLINIC | Age: 83
End: 2023-09-17
Payer: MEDICARE

## 2023-09-19 ENCOUNTER — TELEPHONE (OUTPATIENT)
Dept: INTERNAL MEDICINE | Facility: CLINIC | Age: 83
End: 2023-09-19
Payer: MEDICARE

## 2023-09-19 DIAGNOSIS — I65.23 BILATERAL CAROTID ARTERY STENOSIS: ICD-10-CM

## 2023-09-19 DIAGNOSIS — E78.5 HYPERLIPIDEMIA ASSOCIATED WITH TYPE 2 DIABETES MELLITUS: ICD-10-CM

## 2023-09-19 DIAGNOSIS — E11.69 HYPERLIPIDEMIA ASSOCIATED WITH TYPE 2 DIABETES MELLITUS: ICD-10-CM

## 2023-09-19 RX ORDER — EZETIMIBE 10 MG/1
10 TABLET ORAL DAILY
Qty: 90 TABLET | Refills: 3 | Status: SHIPPED | OUTPATIENT
Start: 2023-09-19 | End: 2024-01-30 | Stop reason: SDUPTHER

## 2023-09-19 RX ORDER — CARVEDILOL 6.25 MG/1
6.25 TABLET ORAL 2 TIMES DAILY WITH MEALS
Qty: 60 TABLET | Refills: 3 | Status: SHIPPED | OUTPATIENT
Start: 2023-09-19 | End: 2023-10-02 | Stop reason: SDUPTHER

## 2023-09-19 NOTE — TELEPHONE ENCOUNTER
----- Message from Amy Sampson sent at 9/19/2023  1:43 PM CDT -----  Contact: [phone]  Requesting an RX refill or new RX.  Is this a refill or new RX: refill  RX name and strength   ezetimibe (ZETIA) 10 mg tablet 90 tablet   Is this a 30 day or 90 day RX:   Pharmacy name and phone #   [facility] - [place] - [address]   Phone:  [phone]  Fax:  [phone]    Patient is out of medication.      Requesting an RX refill or new RX.  Is this a refill or new RX: refill  RX name and strength   carvediloL (COREG) 6.25 MG tablet 60 tablet   Is this a 30 day or 90 day RX: 90  Pharmacy name and phone # :  [facility] [place] - [address]   Phone:  [phone]  Fax:  [phone]       Patient is out of medication.

## 2023-09-19 NOTE — TELEPHONE ENCOUNTER
----- Message from Amy Sampson sent at 9/19/2023  1:40 PM CDT -----  Contact: [phone]  Caller is requesting an earlier appointment then we can schedule.  Caller is requesting a message be sent to the provider.  I  When is the next available appointment with their provider:  March 2024  Reason for the appointment:  6 mth /f/u  which was 9/13/23 however it was a book out  Patient preference of timeframe to be scheduled:    Would the patient like a call back, or a response through their [facility] portal?:   call back  Comments:   Please call to r/s

## 2023-09-19 NOTE — TELEPHONE ENCOUNTER
Spoke to pt. Pt confirmed of recently approved refills.    Medication list reviewed.    Pt advised that once we have a few more dates available to reschedule her visit, we will be in touch.

## 2023-10-02 ENCOUNTER — OFFICE VISIT (OUTPATIENT)
Dept: INTERNAL MEDICINE | Facility: CLINIC | Age: 83
End: 2023-10-02
Payer: MEDICARE

## 2023-10-02 VITALS
SYSTOLIC BLOOD PRESSURE: 142 MMHG | TEMPERATURE: 98 F | HEART RATE: 75 BPM | BODY MASS INDEX: 27.97 KG/M2 | DIASTOLIC BLOOD PRESSURE: 60 MMHG | OXYGEN SATURATION: 98 % | WEIGHT: 174 LBS | HEIGHT: 66 IN | RESPIRATION RATE: 16 BRPM

## 2023-10-02 DIAGNOSIS — E78.5 HYPERLIPIDEMIA ASSOCIATED WITH TYPE 2 DIABETES MELLITUS: ICD-10-CM

## 2023-10-02 DIAGNOSIS — I63.9 DYSARTHRIA DUE TO ACUTE STROKE: ICD-10-CM

## 2023-10-02 DIAGNOSIS — I63.312 CEREBROVASCULAR ACCIDENT (CVA) DUE TO THROMBOSIS OF LEFT MIDDLE CEREBRAL ARTERY: ICD-10-CM

## 2023-10-02 DIAGNOSIS — E11.69 HYPERLIPIDEMIA ASSOCIATED WITH TYPE 2 DIABETES MELLITUS: ICD-10-CM

## 2023-10-02 DIAGNOSIS — E11.22 CKD STAGE 3 SECONDARY TO DIABETES: ICD-10-CM

## 2023-10-02 DIAGNOSIS — G31.9 DEGENERATIVE DISEASE OF NERVOUS SYSTEM, UNSPECIFIED: ICD-10-CM

## 2023-10-02 DIAGNOSIS — R60.0 BILATERAL LEG EDEMA: ICD-10-CM

## 2023-10-02 DIAGNOSIS — R80.9 TYPE 2 DIABETES MELLITUS WITH MICROALBUMINURIA, WITHOUT LONG-TERM CURRENT USE OF INSULIN: ICD-10-CM

## 2023-10-02 DIAGNOSIS — E11.29 TYPE 2 DIABETES MELLITUS WITH MICROALBUMINURIA, WITHOUT LONG-TERM CURRENT USE OF INSULIN: ICD-10-CM

## 2023-10-02 DIAGNOSIS — Z00.00 ANNUAL PHYSICAL EXAM: Primary | ICD-10-CM

## 2023-10-02 DIAGNOSIS — I15.2 HYPERTENSION ASSOCIATED WITH DIABETES: ICD-10-CM

## 2023-10-02 DIAGNOSIS — N18.30 CKD STAGE 3 SECONDARY TO DIABETES: ICD-10-CM

## 2023-10-02 DIAGNOSIS — R30.0 DYSURIA: ICD-10-CM

## 2023-10-02 DIAGNOSIS — R47.1 DYSARTHRIA DUE TO ACUTE STROKE: ICD-10-CM

## 2023-10-02 DIAGNOSIS — E11.59 HYPERTENSION ASSOCIATED WITH DIABETES: ICD-10-CM

## 2023-10-02 LAB
BILIRUB SERPL-MCNC: NORMAL MG/DL
BLOOD URINE, POC: NORMAL
CLARITY, POC UA: NORMAL
COLOR, POC UA: YELLOW
GLUCOSE UR QL STRIP: 50
KETONES UR QL STRIP: NORMAL
LEUKOCYTE ESTERASE URINE, POC: NORMAL
NITRITE, POC UA: NORMAL
PH, POC UA: 7
PROTEIN, POC: NORMAL
SPECIFIC GRAVITY, POC UA: 1.01
UROBILINOGEN, POC UA: NORMAL

## 2023-10-02 PROCEDURE — 81002 POCT URINE DIPSTICK WITHOUT MICROSCOPE: ICD-10-PCS | Mod: HCNC,S$GLB,, | Performed by: INTERNAL MEDICINE

## 2023-10-02 PROCEDURE — 1101F PT FALLS ASSESS-DOCD LE1/YR: CPT | Mod: HCNC,CPTII,S$GLB, | Performed by: INTERNAL MEDICINE

## 2023-10-02 PROCEDURE — 3077F PR MOST RECENT SYSTOLIC BLOOD PRESSURE >= 140 MM HG: ICD-10-PCS | Mod: HCNC,CPTII,S$GLB, | Performed by: INTERNAL MEDICINE

## 2023-10-02 PROCEDURE — 1101F PR PT FALLS ASSESS DOC 0-1 FALLS W/OUT INJ PAST YR: ICD-10-PCS | Mod: HCNC,CPTII,S$GLB, | Performed by: INTERNAL MEDICINE

## 2023-10-02 PROCEDURE — 1125F AMNT PAIN NOTED PAIN PRSNT: CPT | Mod: HCNC,CPTII,S$GLB, | Performed by: INTERNAL MEDICINE

## 2023-10-02 PROCEDURE — 99397 PER PM REEVAL EST PAT 65+ YR: CPT | Mod: HCNC,S$GLB,, | Performed by: INTERNAL MEDICINE

## 2023-10-02 PROCEDURE — 99999 PR PBB SHADOW E&M-EST. PATIENT-LVL IV: ICD-10-PCS | Mod: PBBFAC,HCNC,, | Performed by: INTERNAL MEDICINE

## 2023-10-02 PROCEDURE — 87077 CULTURE AEROBIC IDENTIFY: CPT | Mod: HCNC | Performed by: INTERNAL MEDICINE

## 2023-10-02 PROCEDURE — 99397 PR PREVENTIVE VISIT,EST,65 & OVER: ICD-10-PCS | Mod: HCNC,S$GLB,, | Performed by: INTERNAL MEDICINE

## 2023-10-02 PROCEDURE — 3077F SYST BP >= 140 MM HG: CPT | Mod: HCNC,CPTII,S$GLB, | Performed by: INTERNAL MEDICINE

## 2023-10-02 PROCEDURE — 99999 PR PBB SHADOW E&M-EST. PATIENT-LVL IV: CPT | Mod: PBBFAC,HCNC,, | Performed by: INTERNAL MEDICINE

## 2023-10-02 PROCEDURE — 87086 URINE CULTURE/COLONY COUNT: CPT | Mod: HCNC | Performed by: INTERNAL MEDICINE

## 2023-10-02 PROCEDURE — 3078F PR MOST RECENT DIASTOLIC BLOOD PRESSURE < 80 MM HG: ICD-10-PCS | Mod: HCNC,CPTII,S$GLB, | Performed by: INTERNAL MEDICINE

## 2023-10-02 PROCEDURE — 87088 URINE BACTERIA CULTURE: CPT | Mod: HCNC | Performed by: INTERNAL MEDICINE

## 2023-10-02 PROCEDURE — 87186 SC STD MICRODIL/AGAR DIL: CPT | Mod: HCNC | Performed by: INTERNAL MEDICINE

## 2023-10-02 PROCEDURE — 3078F DIAST BP <80 MM HG: CPT | Mod: HCNC,CPTII,S$GLB, | Performed by: INTERNAL MEDICINE

## 2023-10-02 PROCEDURE — 3288F PR FALLS RISK ASSESSMENT DOCUMENTED: ICD-10-PCS | Mod: HCNC,CPTII,S$GLB, | Performed by: INTERNAL MEDICINE

## 2023-10-02 PROCEDURE — 3288F FALL RISK ASSESSMENT DOCD: CPT | Mod: HCNC,CPTII,S$GLB, | Performed by: INTERNAL MEDICINE

## 2023-10-02 PROCEDURE — 1159F MED LIST DOCD IN RCRD: CPT | Mod: HCNC,CPTII,S$GLB, | Performed by: INTERNAL MEDICINE

## 2023-10-02 PROCEDURE — 1125F PR PAIN SEVERITY QUANTIFIED, PAIN PRESENT: ICD-10-PCS | Mod: HCNC,CPTII,S$GLB, | Performed by: INTERNAL MEDICINE

## 2023-10-02 PROCEDURE — 81002 URINALYSIS NONAUTO W/O SCOPE: CPT | Mod: HCNC,S$GLB,, | Performed by: INTERNAL MEDICINE

## 2023-10-02 PROCEDURE — 1159F PR MEDICATION LIST DOCUMENTED IN MEDICAL RECORD: ICD-10-PCS | Mod: HCNC,CPTII,S$GLB, | Performed by: INTERNAL MEDICINE

## 2023-10-02 RX ORDER — CARVEDILOL 6.25 MG/1
6.25 TABLET ORAL 2 TIMES DAILY WITH MEALS
Qty: 180 TABLET | Refills: 3 | Status: SHIPPED | OUTPATIENT
Start: 2023-10-02

## 2023-10-02 RX ORDER — AMLODIPINE BESYLATE 10 MG/1
5 TABLET ORAL DAILY
Qty: 90 TABLET | Refills: 3
Start: 2023-10-02

## 2023-10-02 NOTE — Clinical Note
Chelo Archibald, saw our mutual patient today and reviewed a neurology note from her outside neurologist with recommendation to DC her Plavix in November.  Just wanted to run that 1 by you to make sure that you are okay with this plan. She will continue on her daily aspirin. Thanks, Karina Farr

## 2023-10-02 NOTE — PROGRESS NOTES
83 y.o. female presents for Annual Physical Exam ( son +)    Former smoker, distant (quit 1966)  Social ETOH    HEALTH MAINTENANCE:  Cholesterol/labs: reviewed  HIV: UTD  HCV: UTD  C-scope: 2020 w/ 4 colon polyps,2 hyperplastic, 2 tubular adenomas  - rec was 3 yr f/up; pt on Plavix and plan to d/c 11/2023, unless Cards feels longer tx needed; consider c-scope next spring 2024  Mammo: pending, but will wait for implantable loop to be removed  - reviewed 2022 mammo , minimal fibroglandular tissuse, pt to perform monthly SBE and advise of any findings  DEXA: 7/2021- normal  PAP: aged out  EYE exam: Due- appt scheduled  VACCINATIONS:    VACCINATIONS:   Shingrix, 4/2022, 2/2022   Tdap, 2/2014.     Pneumovax, 2006   Prevnar,  2015    Flu, yearly    Covid: 2/2 , + booster                  DM tx w/metformin 500mg BID  Denied increased thirst, urination, or hypoglycemia episodes  A1C ==>    Lab Results   Component Value Date    LABA1C 7.5 02/21/2022    HGBA1C 8.1 (H) 09/06/2023         HTN tx w/amlodipine 10mg qd, HCTZ 25mg qd, carvedilol 6.25mg BID, and losartan 100mg qd  Denied HA or dizziness  BP today==>142/60    Dyslipidemia tx w/rosuvastatin 40mg qd  Denied unusual muscle pain or chest pain  LDL==>  Lab Results   Component Value Date    CHOL 147 09/06/2023    CHOL 160 06/12/2023    CHOL 135 03/01/2023     Lab Results   Component Value Date    HDL 45 09/06/2023    HDL 51 06/12/2023    HDL 47 03/01/2023     Lab Results   Component Value Date    LDLCALC 80.8 09/06/2023    LDLCALC 77.8 06/12/2023    LDLCALC 63.8 03/01/2023     Lab Results   Component Value Date    TRIG 106 09/06/2023    TRIG 156 (H) 06/12/2023    TRIG 121 03/01/2023     Lab Results   Component Value Date    CHOLHDL 30.6 09/06/2023    CHOLHDL 31.9 06/12/2023    CHOLHDL 34.8 03/01/2023     CVA, 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  S/p thrombectomy  + expressive aphasia, mild  + memory loss, mild    ROS:  No fever, chills, or night sweats  No  blurry vision or visual disturbance  No dysphagia or early satiety  No palpitations or tachycardia  No cough or wheezing  No GERD or abdominal pain  No numbness or focal deficits  ADL's: 80-90% back s/p CVA   Memory: impaired  Mental Health: Good- 20 yo grandson lives w/ her in 2 story home, but able to live on 1st floor if necessary    AD's: + will, ?living will, and POA(children)  Nutrition: Good  Safety: intact  Gait: no recent falls  Urinary incontinence: + dysuria  Remainder of review negative except as previously noted    PAST MEDICAL HX: Reviewed  PAST SURGICAL HX: Reviewed  SOCIAL HX: Reviewed  FAMILY HX: Reviewed    PHYSICAL EXAM:  VS: As noted  GENERAL: WDWN, A&O, NAD, conversant and co-operative  EYES: Conj/lids unremarkable, sclera anicteric  NECK: Supple w/o lymphadenopathy or thyromegaly  RESPIRATORY: Efforts are unlabored. Lungs CTAP  CARDIOVASCULAR: Heart RRR. No carotid bruits noted.   + pedal pulses.   1-2+ LE edema(son notes exacerbated with increased amlodipine and patient reports lower extremity edema improves overnight with elevation)  GASTROINTESTINAL: BS+, soft , NT/ND, no HSM noted  MUSCULOSKELETAL: Gait normal. No CCE  NEUROLOGIC: VIEIRA. No tremor noted  SKIN: Warm and dry    IMPRESSION/PLAN:  Annual Physical  DM w/ microalbuminemia/CKD 3a, A1C elevated  -START Jardiance 10mg  -RESTART metformin 500mg qd ( hold off on PM dose for now), now that eGFR improved  -diabetic diet  -hydration, water best option  HTN associated DM, elevated  ((Home BP range 107-147.<70 (3/29 BP readings elevated))  LE edema, B/L  -DECREASE amlodipine 10 mg to 5mg qd  -INCREASE HCTZ to 25mg qd  -continue losartan 100mg qg  -continue carvedilol 6.25mg BID  -continue low salt diet  HLD associated DM,  -continue rosuvastatin 40mg  -continue Zetia 10mg ( pt out of x several days )  CVA stroke  -continue Plavix til November  Expressive aphasia  Degenerative system-nervous system  Dysuria  -PCOT urine  -pt sx she has  Augmentin at home to start   HM  -c-scope on hold  -mammo on hold  -reviewed vaccines recs  -eye exam pending  -RTC 6 mos  -lab 3 mos, recheck A1C, no appt needed

## 2023-10-04 LAB — BACTERIA UR CULT: ABNORMAL

## 2023-10-05 ENCOUNTER — TELEPHONE (OUTPATIENT)
Dept: INTERNAL MEDICINE | Facility: CLINIC | Age: 83
End: 2023-10-05
Payer: MEDICARE

## 2023-10-05 ENCOUNTER — PATIENT MESSAGE (OUTPATIENT)
Dept: ADMINISTRATIVE | Facility: HOSPITAL | Age: 83
End: 2023-10-05
Payer: MEDICARE

## 2023-10-05 ENCOUNTER — PATIENT OUTREACH (OUTPATIENT)
Dept: ADMINISTRATIVE | Facility: HOSPITAL | Age: 83
End: 2023-10-05
Payer: MEDICARE

## 2023-10-05 RX ORDER — AMOXICILLIN AND CLAVULANATE POTASSIUM 875; 125 MG/1; MG/1
1 TABLET, FILM COATED ORAL 2 TIMES DAILY
Qty: 14 TABLET | Refills: 0 | Status: SHIPPED | OUTPATIENT
Start: 2023-10-05 | End: 2024-03-23 | Stop reason: ALTCHOICE

## 2023-10-05 NOTE — LETTER
AUTHORIZATION FOR RELEASE OF   CONFIDENTIAL INFORMATION        We are seeing Zaida Liu, date of birth 1940, in the clinic at [facility] INTERNAL MEDICINE. Karina Wilson MD is the patient's PCP. Zaida Liu has an outstanding lab/procedure at the time we reviewed her chart. In order to help keep her health information updated, she has authorized us to request the following medical record(s):        (  )  MAMMOGRAM                                      (  )  COLONOSCOPY      (  )  PAP SMEAR                                          (  )  OUTSIDE LAB RESULTS     (  )  DEXA SCAN                                          (  x)  EYE EXAM            (  )  FOOT EXAM                                          (  )  ENTIRE RECORD     (  )  OUTSIDE IMMUNIZATIONS                 (  )  _______________         Please fax records to [facility], Plunkett-Kasparek, [place] Ellen, MD, [phone]     If you have any questions, please contact Yaquelin at [phone].           Patient Name: Zaida Liu  : 1940  Patient Phone #: [phone]

## 2023-10-05 NOTE — TELEPHONE ENCOUNTER
Please advise patient that her urine culture revealed an infection and the sensitivities have resulted.  Augmentin was sent to her pharmacy to take for a week, with food and at least 8 oz of liquid and please remind her that she needs to hydrate really well with water

## 2023-10-05 NOTE — PROGRESS NOTES
Chart reviewed  Immunizations reconciled   Portal message sent   Release sent to obtain eye exam

## 2023-10-05 NOTE — TELEPHONE ENCOUNTER
Spoke to pt's son, Alphonso. I informed of results, rx, and recommendations.  He stated that he will  the rx, and have the pt start today.

## 2023-10-13 ENCOUNTER — CLINICAL SUPPORT (OUTPATIENT)
Dept: CARDIOLOGY | Facility: HOSPITAL | Age: 83
End: 2023-10-13
Payer: MEDICARE

## 2023-10-13 DIAGNOSIS — Z95.818 PRESENCE OF OTHER CARDIAC IMPLANTS AND GRAFTS: ICD-10-CM

## 2023-10-13 PROCEDURE — G2066 INTER DEVC REMOTE 30D: HCPCS | Mod: HCNC | Performed by: INTERNAL MEDICINE

## 2023-10-24 ENCOUNTER — CLINICAL SUPPORT (OUTPATIENT)
Dept: CARDIOLOGY | Facility: HOSPITAL | Age: 83
End: 2023-10-24
Attending: INTERNAL MEDICINE
Payer: MEDICARE

## 2023-10-24 DIAGNOSIS — Z95.818 PRESENCE OF OTHER CARDIAC IMPLANTS AND GRAFTS: ICD-10-CM

## 2023-10-24 PROCEDURE — 93298 CARDIAC DEVICE CHECK - REMOTE: ICD-10-PCS | Mod: HCNC,,, | Performed by: INTERNAL MEDICINE

## 2023-10-24 PROCEDURE — 93298 REM INTERROG DEV EVAL SCRMS: CPT | Mod: HCNC,,, | Performed by: INTERNAL MEDICINE

## 2023-10-24 PROCEDURE — G2066 INTER DEVC REMOTE 30D: HCPCS | Mod: HCNC | Performed by: INTERNAL MEDICINE

## 2023-10-30 PROBLEM — I63.9 CRYPTOGENIC STROKE: Status: RESOLVED | Noted: 2023-07-30 | Resolved: 2023-10-30

## 2023-11-01 ENCOUNTER — TELEPHONE (OUTPATIENT)
Dept: INTERNAL MEDICINE | Facility: CLINIC | Age: 83
End: 2023-11-01
Payer: MEDICARE

## 2023-11-01 DIAGNOSIS — R30.0 DYSURIA: Primary | ICD-10-CM

## 2023-11-01 DIAGNOSIS — R35.0 URINARY FREQUENCY: ICD-10-CM

## 2023-11-01 PROBLEM — Z95.818 STATUS POST PLACEMENT OF IMPLANTABLE LOOP RECORDER: Status: ACTIVE | Noted: 2023-11-01

## 2023-11-01 NOTE — TELEPHONE ENCOUNTER
----- Message from Carmelita Reno sent at 11/1/2023  2:54 PM CDT -----  Contact: P [phone]  Patient is requesting a call back from the staff.

## 2023-11-01 NOTE — TELEPHONE ENCOUNTER
----- Message from Carmelita Reno sent at 11/1/2023  4:08 PM CDT -----  Contact: Caller [phone]  1MEDICALADVICE     Patient is calling for Medical Advice regarding: patient took  her medicine for  UTI but still has the infection and  need more medicine     How long has patient had these symptoms: 2 weeks.     Pharmacy name and phone#:   [facility] [address]  [place] [address] - [address]  [address]  [place] [address]  Phone: [phone] Fax: [phone]      Would like response via [facility]: Phone     Comments:

## 2023-11-01 NOTE — PROGRESS NOTES
Ms. Liu is a patient of Dr. Araujo and was last seen in clinic 7/11/2023.      Subjective:   Patient ID:  Zaida Liu is an 83 y.o. female who presents for follow up of Loop recorder  .     HPI:    Ms. Liu is an 83 y.o. female with bicuspid AV s/p AVR (2015), HTN, DM, aortic atherosclerosis, CVA, ILR here for follow up after loop implantation.    Background:    Referring Cardiologist: Cristela Gray MD  Primary Care Physician: Karina Wilson MD    Mrs. Liu has a hx of bicuspid AV s/p bioprosthetic AVR in 2015, hypertension, type 2 diabetes mellitus, aortic atherosclerotic disease, and cryptogenic stroke. Her first stroke occurred in August of 2022. She had a recurrent stroke in May of 2023. This was an embolic left MCA stroke treated with thrombectomy at [facility]. A 30 day monitor from January of 2023 noted no AF. She is referred for ILR.    I reviewed all available ECGs which show sinus rhythm.  In summary, Mrs. Liu is a pleasant 83 year-old woman with bicuspid AV s/p bioprosthetic AVR in 2015, hypertension, type 2 diabetes mellitus, aortic atherosclerotic disease, and embolic cryptogenic stroke. A 30 day event monitor showed no AF. We discussed the potential etiology of undiagnosed atrial arrhythmias (atrial fibrillation, atrial flutter) as possible culprits for recurrent strokes. We discussed how stroke prevention strategies are different in the setting of atrial fibrillation and flutter with use of anticoagulation. To date no atrial arrhythmias have been observed however she has risk factors for atrial fibrillation. Discussed the benefit of long-term heart rhythm monitoring with an implantable loop recorder. Discussed the risks of implantation including pain, infection, bleeding and rare risk of device erosion. She understood and elects to proceed. Consent signed.    Plan  Jerzy SCHWARTZ Sci    Update (11/03/2023):    7/21/2023: Successful implantation of Loop Recorder.    Today she says  she has no new cardiac complaints. Ms. Liu reports no chest pain with exertion or at rest, palpitations, SOB, MARIN, dizziness, or syncope.    Loop reports have shown non arrhythmia to date (most recent remote 10/24/23).    I have personally reviewed the patient's EKG today, which shows sinus rhythm at 68bpm. NJ interval is 196. QRS is 88. QTc is 414.    Relevant Cardiac Test Results:    2D Echo (12/29/2022):  Mild left atrial enlargement.  The left ventricle is normal in size with normal systolic function.  The estimated ejection fraction is 65%.  Grade I left ventricular diastolic dysfunction.  Normal right ventricular size with normal right ventricular systolic function.  There is a 21 mm [facility] pericardial bioprosthetic aortic valve present. There is no aortic insufficiency present. Prosthetic aortic valve is normal.  The aortic valve mean gradient is 10 mmHg with a dimensionless index of 0.54.  Normal central venous pressure (3 mmHg).  The estimated PA systolic pressure is 29 mmHg.  There is no pulmonary hypertension.    Current Outpatient Medications   Medication Sig    amLODIPine (NORVASC) 10 MG tablet Take 0.5 tablets (5 mg total) by mouth once daily.    amoxicillin-clavulanate 875-125mg (AUGMENTIN) 875-125 mg per tablet Take 1 tablet by mouth 2 (two) times daily.    aspirin (ECOTRIN) 81 MG EC tablet Take 81 mg by mouth once daily.    B-complex with vitamin C (VITAMIN B COMPLEX-C ORAL) Take by mouth. Vitamin b3    carvediloL (COREG) 6.25 MG tablet Take 1 tablet (6.25 mg total) by mouth 2 (two) times daily with meals.    clopidogreL (PLAVIX) 75 mg tablet TAKE 1 TABLET ONE TIME DAILY    empagliflozin (JARDIANCE) 10 mg tablet Take 1 tablet (10 mg total) by mouth once daily.    ezetimibe (ZETIA) 10 mg tablet Take 1 tablet (10 mg total) by mouth once daily.    fenofibrate micronized (LOFIBRA) 134 MG Cap TAKE 1 CAPSULE DAILY WITH BREAKFAST.    hydroCHLOROthiazide (HYDRODIURIL) 25 MG tablet TAKE 1 TABLET (25 MG  "TOTAL) BY MOUTH ONCE DAILY.    losartan (COZAAR) 100 MG tablet TAKE 1 TABLET DAILY.    metFORMIN (GLUCOPHAGE) 500 MG tablet Take 1 tablet (500 mg total) by mouth daily with breakfast.    omeprazole (PRILOSEC) 20 MG capsule Take 20 mg by mouth every morning.    rosuvastatin (CRESTOR) 40 MG Tab TAKE 1 TABLET (40 MG TOTAL) BY MOUTH EVERY EVENING.    vitamin D 1000 units Tab Take 1,000 Units by mouth once daily.     No current facility-administered medications for this visit.       Review of Systems   Constitutional: Negative for malaise/fatigue.   Cardiovascular:  Negative for chest pain, dyspnea on exertion, irregular heartbeat, leg swelling and palpitations.   Respiratory:  Negative for shortness of breath.    Hematologic/Lymphatic: Negative for bleeding problem.   Skin:  Negative for rash.   Musculoskeletal:  Negative for myalgias.   Gastrointestinal:  Negative for hematemesis, hematochezia and nausea.   Genitourinary:  Negative for hematuria.   Neurological:  Negative for light-headedness.   Psychiatric/Behavioral:  Negative for altered mental status.    Allergic/Immunologic: Negative for persistent infections.       Objective:          BP (!) 152/70   Pulse 68   Ht 5' 6" (1.676 m)   Wt 77.2 kg (170 lb 3.1 oz)   BMI 27.47 kg/m²     Physical Exam  Vitals and nursing note reviewed.   Constitutional:       Appearance: Normal appearance. She is well-developed.   HENT:      Head: Normocephalic.      Nose: Nose normal.   Eyes:      Pupils: Pupils are equal, round, and reactive to light.   Cardiovascular:      Rate and Rhythm: Normal rate and regular rhythm.   Pulmonary:      Effort: No respiratory distress.      Breath sounds: Normal breath sounds.   Musculoskeletal:         General: Normal range of motion.   Skin:     General: Skin is warm and dry.      Findings: No erythema.   Neurological:      Mental Status: She is alert and oriented to person, place, and time.   Psychiatric:         Speech: Speech normal.        "  Behavior: Behavior normal.           Lab Results   Component Value Date     09/06/2023    K 4.6 09/06/2023    MG 1.5 (L) 12/15/2015    BUN 30 (H) 09/06/2023    CREATININE 1.2 09/06/2023    ALT 16 09/06/2023    AST 18 09/06/2023    HGB 11.2 (L) 03/01/2023    HCT 34.7 (L) 03/01/2023    HCT 28 (L) 08/25/2015    TSH 1.462 06/12/2023    LDLCALC 80.8 09/06/2023       Recent Labs   Lab 05/14/22  1925 05/19/23  1606 05/20/23  0331   INR 1.0 1.1 1.1       Assessment:     1. Status post placement of implantable loop recorder    2. History of CVA (cerebrovascular accident)    3. Obesity, diabetes, and hypertension syndrome      Plan:     In summary,  Ms. Liu is an 83 y.o. female with bicuspid AV s/p AVR (2015), HTN, DM, aortic atherosclerosis, CVA, ILR here for follow up after loop implantation.  She is 3 months s/p implantation of loop recorder to monitor for occult atrial fibrillation after cryptogenic CVA. To date, no arrhythmias have been identified on device.   We reviewed process of monitoring with monthly reports. Home monitor functioning. She feels well. All questions answered.     Continue current regimen  Continue monthly loop reports  RTC 1 yr, sooner if needed    *A copy of this note has been sent to Dr. Araujo*    Follow up in about 1 year (around 11/3/2024).    ------------------------------------------------------------------    RAMAN Land, NP-C  Cardiac Electrophysiology

## 2023-11-02 ENCOUNTER — TELEPHONE (OUTPATIENT)
Dept: ELECTROPHYSIOLOGY | Facility: CLINIC | Age: 83
End: 2023-11-02
Payer: MEDICARE

## 2023-11-03 ENCOUNTER — OFFICE VISIT (OUTPATIENT)
Dept: ELECTROPHYSIOLOGY | Facility: CLINIC | Age: 83
End: 2023-11-03
Payer: MEDICARE

## 2023-11-03 ENCOUNTER — LAB VISIT (OUTPATIENT)
Dept: LAB | Facility: HOSPITAL | Age: 83
End: 2023-11-03
Attending: INTERNAL MEDICINE
Payer: MEDICARE

## 2023-11-03 ENCOUNTER — HOSPITAL ENCOUNTER (OUTPATIENT)
Dept: CARDIOLOGY | Facility: CLINIC | Age: 83
Discharge: HOME | End: 2023-11-03
Payer: MEDICARE

## 2023-11-03 VITALS
WEIGHT: 170.19 LBS | DIASTOLIC BLOOD PRESSURE: 70 MMHG | BODY MASS INDEX: 27.35 KG/M2 | HEART RATE: 68 BPM | HEIGHT: 66 IN | SYSTOLIC BLOOD PRESSURE: 152 MMHG

## 2023-11-03 DIAGNOSIS — E11.59 OBESITY, DIABETES, AND HYPERTENSION SYNDROME: ICD-10-CM

## 2023-11-03 DIAGNOSIS — I15.2 OBESITY, DIABETES, AND HYPERTENSION SYNDROME: ICD-10-CM

## 2023-11-03 DIAGNOSIS — R30.0 DYSURIA: ICD-10-CM

## 2023-11-03 DIAGNOSIS — Z86.73 HISTORY OF CVA (CEREBROVASCULAR ACCIDENT): ICD-10-CM

## 2023-11-03 DIAGNOSIS — E66.9 OBESITY, DIABETES, AND HYPERTENSION SYNDROME: ICD-10-CM

## 2023-11-03 DIAGNOSIS — I63.9 CRYPTOGENIC STROKE: ICD-10-CM

## 2023-11-03 DIAGNOSIS — Z95.818 STATUS POST PLACEMENT OF IMPLANTABLE LOOP RECORDER: Primary | ICD-10-CM

## 2023-11-03 DIAGNOSIS — R35.0 URINARY FREQUENCY: ICD-10-CM

## 2023-11-03 DIAGNOSIS — E11.69 OBESITY, DIABETES, AND HYPERTENSION SYNDROME: ICD-10-CM

## 2023-11-03 PROCEDURE — 3078F PR MOST RECENT DIASTOLIC BLOOD PRESSURE < 80 MM HG: ICD-10-PCS | Mod: HCNC,CPTII,S$GLB, | Performed by: NURSE PRACTITIONER

## 2023-11-03 PROCEDURE — 99214 PR OFFICE/OUTPT VISIT, EST, LEVL IV, 30-39 MIN: ICD-10-PCS | Mod: HCNC,S$GLB,, | Performed by: NURSE PRACTITIONER

## 2023-11-03 PROCEDURE — 1160F RVW MEDS BY RX/DR IN RCRD: CPT | Mod: HCNC,CPTII,S$GLB, | Performed by: NURSE PRACTITIONER

## 2023-11-03 PROCEDURE — 3077F PR MOST RECENT SYSTOLIC BLOOD PRESSURE >= 140 MM HG: ICD-10-PCS | Mod: HCNC,CPTII,S$GLB, | Performed by: NURSE PRACTITIONER

## 2023-11-03 PROCEDURE — 1160F PR REVIEW ALL MEDS BY PRESCRIBER/CLIN PHARMACIST DOCUMENTED: ICD-10-PCS | Mod: HCNC,CPTII,S$GLB, | Performed by: NURSE PRACTITIONER

## 2023-11-03 PROCEDURE — 93005 RHYTHM STRIP: ICD-10-PCS | Mod: HCNC,S$GLB,, | Performed by: INTERNAL MEDICINE

## 2023-11-03 PROCEDURE — 93010 ELECTROCARDIOGRAM REPORT: CPT | Mod: HCNC,S$GLB,, | Performed by: INTERNAL MEDICINE

## 2023-11-03 PROCEDURE — 1101F PT FALLS ASSESS-DOCD LE1/YR: CPT | Mod: HCNC,CPTII,S$GLB, | Performed by: NURSE PRACTITIONER

## 2023-11-03 PROCEDURE — 99999 PR PBB SHADOW E&M-EST. PATIENT-LVL III: CPT | Mod: PBBFAC,HCNC,, | Performed by: NURSE PRACTITIONER

## 2023-11-03 PROCEDURE — 93005 ELECTROCARDIOGRAM TRACING: CPT | Mod: HCNC,S$GLB,, | Performed by: INTERNAL MEDICINE

## 2023-11-03 PROCEDURE — 1159F MED LIST DOCD IN RCRD: CPT | Mod: HCNC,CPTII,S$GLB, | Performed by: NURSE PRACTITIONER

## 2023-11-03 PROCEDURE — 93010 RHYTHM STRIP: ICD-10-PCS | Mod: HCNC,S$GLB,, | Performed by: INTERNAL MEDICINE

## 2023-11-03 PROCEDURE — 1101F PR PT FALLS ASSESS DOC 0-1 FALLS W/OUT INJ PAST YR: ICD-10-PCS | Mod: HCNC,CPTII,S$GLB, | Performed by: NURSE PRACTITIONER

## 2023-11-03 PROCEDURE — 3288F FALL RISK ASSESSMENT DOCD: CPT | Mod: HCNC,CPTII,S$GLB, | Performed by: NURSE PRACTITIONER

## 2023-11-03 PROCEDURE — 3288F PR FALLS RISK ASSESSMENT DOCUMENTED: ICD-10-PCS | Mod: HCNC,CPTII,S$GLB, | Performed by: NURSE PRACTITIONER

## 2023-11-03 PROCEDURE — 3078F DIAST BP <80 MM HG: CPT | Mod: HCNC,CPTII,S$GLB, | Performed by: NURSE PRACTITIONER

## 2023-11-03 PROCEDURE — 3077F SYST BP >= 140 MM HG: CPT | Mod: HCNC,CPTII,S$GLB, | Performed by: NURSE PRACTITIONER

## 2023-11-03 PROCEDURE — 87086 URINE CULTURE/COLONY COUNT: CPT | Mod: HCNC | Performed by: INTERNAL MEDICINE

## 2023-11-03 PROCEDURE — 99214 OFFICE O/P EST MOD 30 MIN: CPT | Mod: HCNC,S$GLB,, | Performed by: NURSE PRACTITIONER

## 2023-11-03 PROCEDURE — 99999 PR PBB SHADOW E&M-EST. PATIENT-LVL III: ICD-10-PCS | Mod: PBBFAC,HCNC,, | Performed by: NURSE PRACTITIONER

## 2023-11-03 PROCEDURE — 1159F PR MEDICATION LIST DOCUMENTED IN MEDICAL RECORD: ICD-10-PCS | Mod: HCNC,CPTII,S$GLB, | Performed by: NURSE PRACTITIONER

## 2023-11-05 LAB
BACTERIA UR CULT: NORMAL
BACTERIA UR CULT: NORMAL

## 2023-11-11 DIAGNOSIS — E78.5 HYPERLIPIDEMIA ASSOCIATED WITH TYPE 2 DIABETES MELLITUS: ICD-10-CM

## 2023-11-11 DIAGNOSIS — E11.69 HYPERLIPIDEMIA ASSOCIATED WITH TYPE 2 DIABETES MELLITUS: ICD-10-CM

## 2023-11-13 RX ORDER — ROSUVASTATIN CALCIUM 40 MG/1
40 TABLET, COATED ORAL NIGHTLY
Qty: 90 TABLET | Refills: 3 | Status: SHIPPED | OUTPATIENT
Start: 2023-11-13

## 2023-11-13 RX ORDER — CLOPIDOGREL BISULFATE 75 MG/1
75 TABLET ORAL
Qty: 90 TABLET | Refills: 3 | Status: SHIPPED | OUTPATIENT
Start: 2023-11-13 | End: 2024-04-03 | Stop reason: ALTCHOICE

## 2023-11-13 NOTE — TELEPHONE ENCOUNTER
The patient was called and her son was informed of Rx's being refilled.  One of the Rx's was Plavix. He wasn't sure if she should be on the medication.  [facility] questioned the Rx.  The son will call the neurologist office to verify.

## 2023-11-20 LAB
OHS CV AF BURDEN PERCENT: < 1
OHS CV DC REMOTE DEVICE TYPE: NORMAL

## 2023-11-25 RX ORDER — METFORMIN HYDROCHLORIDE 500 MG/1
500 TABLET ORAL 2 TIMES DAILY WITH MEALS
Qty: 180 TABLET | Refills: 3 | Status: SHIPPED | OUTPATIENT
Start: 2023-11-25 | End: 2024-04-03

## 2023-11-29 ENCOUNTER — CLINICAL SUPPORT (OUTPATIENT)
Dept: CARDIOLOGY | Facility: HOSPITAL | Age: 83
End: 2023-11-29
Attending: INTERNAL MEDICINE
Payer: MEDICARE

## 2023-11-29 ENCOUNTER — CLINICAL SUPPORT (OUTPATIENT)
Dept: CARDIOLOGY | Facility: HOSPITAL | Age: 83
End: 2023-11-29

## 2023-11-29 DIAGNOSIS — Z95.818 PRESENCE OF OTHER CARDIAC IMPLANTS AND GRAFTS: ICD-10-CM

## 2023-12-27 DIAGNOSIS — E11.59 HYPERTENSION ASSOCIATED WITH DIABETES: ICD-10-CM

## 2023-12-27 DIAGNOSIS — I15.2 HYPERTENSION ASSOCIATED WITH DIABETES: ICD-10-CM

## 2023-12-27 RX ORDER — LOSARTAN POTASSIUM 100 MG/1
100 TABLET ORAL
Qty: 90 TABLET | Refills: 3 | Status: SHIPPED | OUTPATIENT
Start: 2023-12-27

## 2023-12-27 RX ORDER — FENOFIBRATE 134 MG/1
CAPSULE ORAL
Qty: 90 CAPSULE | Refills: 3 | Status: SHIPPED | OUTPATIENT
Start: 2023-12-27

## 2024-01-03 ENCOUNTER — CLINICAL SUPPORT (OUTPATIENT)
Dept: CARDIOLOGY | Facility: HOSPITAL | Age: 84
End: 2024-01-03
Payer: MEDICARE

## 2024-01-03 ENCOUNTER — CLINICAL SUPPORT (OUTPATIENT)
Dept: CARDIOLOGY | Facility: HOSPITAL | Age: 84
End: 2024-01-03
Attending: INTERNAL MEDICINE
Payer: MEDICARE

## 2024-01-03 DIAGNOSIS — Z95.818 PRESENCE OF OTHER CARDIAC IMPLANTS AND GRAFTS: ICD-10-CM

## 2024-01-03 PROCEDURE — 93298 REM INTERROG DEV EVAL SCRMS: CPT | Mod: 26,HCNC,, | Performed by: INTERNAL MEDICINE

## 2024-01-17 LAB
OHS CV AF BURDEN PERCENT: < 1
OHS CV DC REMOTE DEVICE TYPE: NORMAL

## 2024-01-30 DIAGNOSIS — E78.5 HYPERLIPIDEMIA ASSOCIATED WITH TYPE 2 DIABETES MELLITUS: ICD-10-CM

## 2024-01-30 DIAGNOSIS — I65.23 BILATERAL CAROTID ARTERY STENOSIS: ICD-10-CM

## 2024-01-30 DIAGNOSIS — E11.69 HYPERLIPIDEMIA ASSOCIATED WITH TYPE 2 DIABETES MELLITUS: ICD-10-CM

## 2024-01-30 RX ORDER — HYDROCHLOROTHIAZIDE 25 MG/1
25 TABLET ORAL
Qty: 90 TABLET | Refills: 3 | Status: CANCELLED | OUTPATIENT
Start: 2024-01-30

## 2024-01-30 RX ORDER — EZETIMIBE 10 MG/1
10 TABLET ORAL DAILY
Qty: 90 TABLET | Refills: 3 | Status: SHIPPED | OUTPATIENT
Start: 2024-01-30 | End: 2025-01-29

## 2024-01-30 NOTE — TELEPHONE ENCOUNTER
----- Message from Veronica Gifford sent at 1/30/2024 10:15 AM CST -----  Regarding: Prescription Authorization    1/30/24-Mikki Mckeon called for pharmacy authorization for prescriptions to be filled. Fax # [phone], Ezepimibe-10 mg, Hydrochlorothiazide -25mg, Jardiance-10 mg. Pt contact #  [phone], MRN# [id number]

## 2024-01-30 NOTE — TELEPHONE ENCOUNTER
I spoke to the son and informed him that [facility] would like to have medications sent to them.  He will check with the patient to see if she has enough medication at present  To wait on a mail order delivery.  He will call me back.

## 2024-01-30 NOTE — TELEPHONE ENCOUNTER
----- Message from Lacey House sent at 1/30/2024 11:11 AM CST -----  Contact: CRISTY DOMÍNGUEZ SON  [phone]  Patient is returning a phone call.  Who left a message for the patient: ELVER   Does patient know what this is regarding:  REFILL   Would you like a call back, or a response through your [facility] portal?:    CALL BACK   Comments:

## 2024-02-07 ENCOUNTER — CLINICAL SUPPORT (OUTPATIENT)
Dept: CARDIOLOGY | Facility: HOSPITAL | Age: 84
End: 2024-02-07
Attending: INTERNAL MEDICINE
Payer: MEDICARE

## 2024-02-07 ENCOUNTER — CLINICAL SUPPORT (OUTPATIENT)
Dept: CARDIOLOGY | Facility: HOSPITAL | Age: 84
End: 2024-02-07
Payer: MEDICARE

## 2024-02-07 DIAGNOSIS — Z95.818 PRESENCE OF OTHER CARDIAC IMPLANTS AND GRAFTS: ICD-10-CM

## 2024-02-07 LAB
OHS CV AF BURDEN PERCENT: < 1
OHS CV DC REMOTE DEVICE TYPE: NORMAL

## 2024-02-07 PROCEDURE — 93298 REM INTERROG DEV EVAL SCRMS: CPT | Mod: 26,HCNC,, | Performed by: INTERNAL MEDICINE

## 2024-02-28 ENCOUNTER — TELEPHONE (OUTPATIENT)
Dept: INTERNAL MEDICINE | Facility: CLINIC | Age: 84
End: 2024-02-28
Payer: MEDICARE

## 2024-02-28 DIAGNOSIS — R82.90 ABNORMAL URINE: Primary | ICD-10-CM

## 2024-02-28 DIAGNOSIS — R30.0 DYSURIA: ICD-10-CM

## 2024-02-28 DIAGNOSIS — R35.0 URINARY FREQUENCY: ICD-10-CM

## 2024-02-28 LAB
OHS CV AF BURDEN PERCENT: < 1
OHS CV DC REMOTE DEVICE TYPE: NORMAL

## 2024-02-29 ENCOUNTER — LAB VISIT (OUTPATIENT)
Dept: LAB | Facility: HOSPITAL | Age: 84
End: 2024-02-29
Attending: INTERNAL MEDICINE
Payer: MEDICARE

## 2024-02-29 DIAGNOSIS — R82.90 ABNORMAL URINE: ICD-10-CM

## 2024-02-29 LAB
BACTERIA #/AREA URNS AUTO: ABNORMAL /HPF
BILIRUB UR QL STRIP: NEGATIVE
CLARITY UR REFRACT.AUTO: ABNORMAL
COLOR UR AUTO: YELLOW
GLUCOSE UR QL STRIP: ABNORMAL
HGB UR QL STRIP: NEGATIVE
KETONES UR QL STRIP: NEGATIVE
LEUKOCYTE ESTERASE UR QL STRIP: ABNORMAL
MICROSCOPIC COMMENT: ABNORMAL
NITRITE UR QL STRIP: NEGATIVE
PH UR STRIP: 6 [PH] (ref 5–8)
PROT UR QL STRIP: NEGATIVE
RBC #/AREA URNS AUTO: 3 /HPF (ref 0–4)
SP GR UR STRIP: 1.02 (ref 1–1.03)
SQUAMOUS #/AREA URNS AUTO: 3 /HPF
URN SPEC COLLECT METH UR: ABNORMAL
WBC #/AREA URNS AUTO: 61 /HPF (ref 0–5)
WBC CLUMPS UR QL AUTO: ABNORMAL
YEAST UR QL AUTO: ABNORMAL

## 2024-02-29 PROCEDURE — 87077 CULTURE AEROBIC IDENTIFY: CPT | Mod: HCNC | Performed by: INTERNAL MEDICINE

## 2024-02-29 PROCEDURE — 87086 URINE CULTURE/COLONY COUNT: CPT | Mod: HCNC | Performed by: INTERNAL MEDICINE

## 2024-02-29 PROCEDURE — 87186 SC STD MICRODIL/AGAR DIL: CPT | Mod: HCNC | Performed by: INTERNAL MEDICINE

## 2024-02-29 PROCEDURE — 81001 URINALYSIS AUTO W/SCOPE: CPT | Mod: HCNC | Performed by: INTERNAL MEDICINE

## 2024-02-29 PROCEDURE — 87088 URINE BACTERIA CULTURE: CPT | Mod: HCNC | Performed by: INTERNAL MEDICINE

## 2024-03-01 ENCOUNTER — PATIENT MESSAGE (OUTPATIENT)
Dept: INTERNAL MEDICINE | Facility: CLINIC | Age: 84
End: 2024-03-01
Payer: MEDICARE

## 2024-03-01 RX ORDER — NITROFURANTOIN 25; 75 MG/1; MG/1
100 CAPSULE ORAL 2 TIMES DAILY
Qty: 14 CAPSULE | Refills: 0 | Status: SHIPPED | OUTPATIENT
Start: 2024-03-01 | End: 2024-03-23 | Stop reason: ALTCHOICE

## 2024-03-01 NOTE — TELEPHONE ENCOUNTER
Patient's urinalysis has resulted and it does reveal bacteria   However, there are no nitrites that are usually present when there is a bladder infection.    Her urine culture is pending and it will give us sensitivities if there is a infection present ,  so that we know the best antibiotic to use.    However as she is symptomatic, an antibiotic will be prescribed today based on her last urine culture sensitivities October 2023.  If the urine culture reveals an infection and the sensitivities are different,   the antibiotic may have to be changed at that time.    Nitrofurantoin 100 mg to be taken twice daily will be E scripted to her local [facility] pharmacy

## 2024-03-02 LAB — BACTERIA UR CULT: ABNORMAL

## 2024-03-04 ENCOUNTER — PATIENT MESSAGE (OUTPATIENT)
Dept: INTERNAL MEDICINE | Facility: CLINIC | Age: 84
End: 2024-03-04
Payer: MEDICARE

## 2024-03-06 ENCOUNTER — TELEPHONE (OUTPATIENT)
Dept: INTERNAL MEDICINE | Facility: CLINIC | Age: 84
End: 2024-03-06
Payer: MEDICARE

## 2024-03-06 NOTE — TELEPHONE ENCOUNTER
----- Message from Karina Wilson MD sent at 3/4/2024  4:35 PM CST -----  Your urine culture sensitivities have resulted and your infection is sensitive to the antibiotic that was prescribed last week.  Please complete the course of antibiotics as prescribed.  valentín

## 2024-03-13 ENCOUNTER — CLINICAL SUPPORT (OUTPATIENT)
Dept: CARDIOLOGY | Facility: HOSPITAL | Age: 84
End: 2024-03-13
Attending: INTERNAL MEDICINE
Payer: MEDICARE

## 2024-03-13 ENCOUNTER — CLINICAL SUPPORT (OUTPATIENT)
Dept: CARDIOLOGY | Facility: HOSPITAL | Age: 84
End: 2024-03-13
Payer: MEDICARE

## 2024-03-13 DIAGNOSIS — Z95.818 PRESENCE OF OTHER CARDIAC IMPLANTS AND GRAFTS: ICD-10-CM

## 2024-03-13 PROCEDURE — 93298 REM INTERROG DEV EVAL SCRMS: CPT | Mod: 26,HCNC,, | Performed by: INTERNAL MEDICINE

## 2024-03-23 NOTE — PROGRESS NOTES
84 y.o. femalepresents in f/u to diabetes, hypertension, obesity ,LVDD,  and dyslipidemia; s/p CVA w/ cognitive mild deficits  Plavix was d/c 11/2023 by Neurology  W/up w/ arrhythmia for afib, review of last report was negative     Pt son, Alphonso +    DM w/ micro albuminemia/CKD 3A, tx w/Jardiance 10 mg q.day and metformin 500 mg b.i.d.  Denied increased thirst, or hypoglycemia episodes  ++ polyuria/dysuria w/ UTI since stared Jardiance, pt c/o sx today  ++incontinence +pad and occ stool incontinence  Son doesn't think pt drinks as much as she reports  A1C ==>    Lab Results   Component Value Date    LABA1C 7.5 02/21/2022    HGBA1C 7.7 (H) 04/03/2024         HTN associated DM tx w/amlodipine 10 mg patient taking half tablet, carvedilol 6.25 mg b.i.d, HCTZ 25 mg and losartan 100 mg.  Denied HA or vertigo; ++ lightheadedness, more orthostatic  BP today==>110/50    Dyslipidemia, associated DM, tx w/Zetia 10 mg, fenofibrate 134 mg, and rosuvastatin 40 mg q.day  Status post CVA with persistent dysarthria  Denied unusual muscle pain or chest pain  LDL==>  Lab Results   Component Value Date    CHOL 147 09/06/2023    CHOL 160 06/12/2023    CHOL 135 03/01/2023     Lab Results   Component Value Date    HDL 45 09/06/2023    HDL 51 06/12/2023    HDL 47 03/01/2023     Lab Results   Component Value Date    LDLCALC 80.8 09/06/2023    LDLCALC 77.8 06/12/2023    LDLCALC 63.8 03/01/2023     Lab Results   Component Value Date    TRIG 106 09/06/2023    TRIG 156 (H) 06/12/2023    TRIG 121 03/01/2023     Lab Results   Component Value Date    CHOLHDL 30.6 09/06/2023    CHOLHDL 31.9 06/12/2023    CHOLHDL 34.8 03/01/2023         ROS:  No fever, chills, or night sweats  No blurry vision or visual disturbance  No dysphagia or early satiety  No palpitations or tachycardia  No cough or wheezing  No GERD or abdominal pain  No numbness or focal deficits  Remainder of review negative except as previously noted    PAST MEDICAL HX: Reviewed  PAST  SURGICAL HX: Reviewed  SOCIAL HX: Reviewed  FAMILY HX: Reviewed    PHYSICAL EXAM:  VS: As noted  GENERAL: WDWN, A&O, NAD, conversant and co-operative  EYES: Conj/lids unremarkable, sclera anicteric,   NECK: Supple w/o lymphadenopathy or thyromegaly  RESPIRATORY: Efforts are unlabored. Lungs CTAP  CARDIOVASCULAR: Heart RRR. No carotid bruits noted. 1+ pedal pulses. No LE edema  GASTROINTESTINAL: BS+, soft , NT/ND, no HSM noted  MUSCULOSKELETAL: Gait normal. No CCE  NEUROLOGIC: VIEIRA. No tremor noted  SKIN: Warm and dry    IMPRESSION/PLAN:  DM w/ microalbuminemia, stable, await A1C   -continue Jardiance 10 mg q.day-started for DM and LVDD  (check for UTI, if becomes recurrent may have to d/c)  -continue metformin 500 mg qd  -continue diabetic diet  CKD 3 secondary to diabetes, stable  -continue hydration, water being the best option  (Unclear pt intake, c/o polyuria that may skew her perception of oral intake)  -avoid NSAIDs  HTN associated DM, stable today, but low normal and w/ orthostatic lightheadedness may need to adjust her BP meds  -continue losartan 100 mg q.day  -continue HCTZ 25 mg q.day  -continue carvedilol 6.25 mg b.i.d  .( metoprolol d/c by Dr. 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  LVDD, stable  -continue present meds including Jardiance (see prior notation)  And BP meds including diuretic, but consideration for decreasing the dose   Polyuria, chronic   -likely multifactorial, on diuretic, and SGLT-2i, along w/ rec hydration  UTI, recurrent, also likely multifactorial  -wears pads for incontinence-urine and occ bowel, being  bacteria source  -SGLT2i may increase susceptibility  -UA/culture pending  Lightheadedness, orthostatic, as noted above  Skin lesion left ear, appears benign, but pt w/ long hx sun exposure  -consult derm, pt has external Dermatologist  HM  -eye exam due  -microalbumin creatinine   -lab today  -rtc 3 mos

## 2024-03-26 LAB
OHS CV AF BURDEN PERCENT: < 1
OHS CV DC REMOTE DEVICE TYPE: NORMAL

## 2024-04-03 ENCOUNTER — LAB VISIT (OUTPATIENT)
Dept: LAB | Facility: HOSPITAL | Age: 84
End: 2024-04-03
Payer: MEDICARE

## 2024-04-03 ENCOUNTER — PATIENT MESSAGE (OUTPATIENT)
Dept: ADMINISTRATIVE | Facility: HOSPITAL | Age: 84
End: 2024-04-03
Payer: MEDICARE

## 2024-04-03 ENCOUNTER — OFFICE VISIT (OUTPATIENT)
Dept: INTERNAL MEDICINE | Facility: CLINIC | Age: 84
End: 2024-04-03
Payer: MEDICARE

## 2024-04-03 ENCOUNTER — TELEPHONE (OUTPATIENT)
Dept: INTERNAL MEDICINE | Facility: CLINIC | Age: 84
End: 2024-04-03
Payer: MEDICARE

## 2024-04-03 ENCOUNTER — PATIENT MESSAGE (OUTPATIENT)
Dept: INTERNAL MEDICINE | Facility: CLINIC | Age: 84
End: 2024-04-03
Payer: MEDICARE

## 2024-04-03 VITALS
BODY MASS INDEX: 26.93 KG/M2 | SYSTOLIC BLOOD PRESSURE: 110 MMHG | WEIGHT: 167.56 LBS | HEART RATE: 68 BPM | TEMPERATURE: 98 F | HEIGHT: 66 IN | DIASTOLIC BLOOD PRESSURE: 50 MMHG | RESPIRATION RATE: 16 BRPM

## 2024-04-03 DIAGNOSIS — E11.69 HYPERLIPIDEMIA ASSOCIATED WITH TYPE 2 DIABETES MELLITUS: ICD-10-CM

## 2024-04-03 DIAGNOSIS — H61.92 SKIN LESION OF LEFT EAR: ICD-10-CM

## 2024-04-03 DIAGNOSIS — E11.22 CKD STAGE 3 SECONDARY TO DIABETES: ICD-10-CM

## 2024-04-03 DIAGNOSIS — E78.5 HYPERLIPIDEMIA ASSOCIATED WITH TYPE 2 DIABETES MELLITUS: ICD-10-CM

## 2024-04-03 DIAGNOSIS — N39.0 URINARY TRACT INFECTION WITHOUT HEMATURIA, SITE UNSPECIFIED: ICD-10-CM

## 2024-04-03 DIAGNOSIS — E11.59 HYPERTENSION ASSOCIATED WITH DIABETES: ICD-10-CM

## 2024-04-03 DIAGNOSIS — E11.29 TYPE 2 DIABETES MELLITUS WITH MICROALBUMINURIA, WITHOUT LONG-TERM CURRENT USE OF INSULIN: Primary | ICD-10-CM

## 2024-04-03 DIAGNOSIS — N18.30 CKD STAGE 3 SECONDARY TO DIABETES: ICD-10-CM

## 2024-04-03 DIAGNOSIS — R80.9 TYPE 2 DIABETES MELLITUS WITH MICROALBUMINURIA, WITHOUT LONG-TERM CURRENT USE OF INSULIN: ICD-10-CM

## 2024-04-03 DIAGNOSIS — I15.2 HYPERTENSION ASSOCIATED WITH DIABETES: ICD-10-CM

## 2024-04-03 DIAGNOSIS — R47.1 DYSARTHRIA DUE TO ACUTE STROKE: ICD-10-CM

## 2024-04-03 DIAGNOSIS — R35.89 POLYURIA: ICD-10-CM

## 2024-04-03 DIAGNOSIS — I51.89 LEFT VENTRICULAR DIASTOLIC DYSFUNCTION WITH PRESERVED SYSTOLIC FUNCTION: ICD-10-CM

## 2024-04-03 DIAGNOSIS — R80.9 TYPE 2 DIABETES MELLITUS WITH MICROALBUMINURIA, WITHOUT LONG-TERM CURRENT USE OF INSULIN: Primary | ICD-10-CM

## 2024-04-03 DIAGNOSIS — I70.0 AORTIC ATHEROSCLEROSIS: ICD-10-CM

## 2024-04-03 DIAGNOSIS — R42 ORTHOSTATIC LIGHTHEADEDNESS: ICD-10-CM

## 2024-04-03 DIAGNOSIS — E11.29 TYPE 2 DIABETES MELLITUS WITH MICROALBUMINURIA, WITHOUT LONG-TERM CURRENT USE OF INSULIN: ICD-10-CM

## 2024-04-03 DIAGNOSIS — Z86.73 H/O COMPLETED STROKE: ICD-10-CM

## 2024-04-03 DIAGNOSIS — I63.9 DYSARTHRIA DUE TO ACUTE STROKE: ICD-10-CM

## 2024-04-03 LAB
ALBUMIN SERPL BCP-MCNC: 4 G/DL (ref 3.5–5.2)
ALP SERPL-CCNC: 41 U/L (ref 55–135)
ALT SERPL W/O P-5'-P-CCNC: 20 U/L (ref 10–44)
ANION GAP SERPL CALC-SCNC: 9 MMOL/L (ref 8–16)
AST SERPL-CCNC: 21 U/L (ref 10–40)
BILIRUB SERPL-MCNC: 0.5 MG/DL (ref 0.1–1)
BUN SERPL-MCNC: 24 MG/DL (ref 8–23)
CALCIUM SERPL-MCNC: 10.3 MG/DL (ref 8.7–10.5)
CHLORIDE SERPL-SCNC: 105 MMOL/L (ref 95–110)
CO2 SERPL-SCNC: 25 MMOL/L (ref 23–29)
CREAT SERPL-MCNC: 1.1 MG/DL (ref 0.5–1.4)
EST. GFR  (NO RACE VARIABLE): 49.9 ML/MIN/1.73 M^2
ESTIMATED AVG GLUCOSE: 174 MG/DL (ref 68–131)
GLUCOSE SERPL-MCNC: 134 MG/DL (ref 70–110)
HBA1C MFR BLD: 7.7 % (ref 4–5.6)
POTASSIUM SERPL-SCNC: 4.3 MMOL/L (ref 3.5–5.1)
PROT SERPL-MCNC: 6.9 G/DL (ref 6–8.4)
SODIUM SERPL-SCNC: 139 MMOL/L (ref 136–145)

## 2024-04-03 PROCEDURE — 83036 HEMOGLOBIN GLYCOSYLATED A1C: CPT | Mod: HCNC | Performed by: INTERNAL MEDICINE

## 2024-04-03 PROCEDURE — 99214 OFFICE O/P EST MOD 30 MIN: CPT | Mod: HCNC,S$GLB,, | Performed by: INTERNAL MEDICINE

## 2024-04-03 PROCEDURE — 3288F FALL RISK ASSESSMENT DOCD: CPT | Mod: HCNC,CPTII,S$GLB, | Performed by: INTERNAL MEDICINE

## 2024-04-03 PROCEDURE — 1159F MED LIST DOCD IN RCRD: CPT | Mod: HCNC,CPTII,S$GLB, | Performed by: INTERNAL MEDICINE

## 2024-04-03 PROCEDURE — 3078F DIAST BP <80 MM HG: CPT | Mod: HCNC,CPTII,S$GLB, | Performed by: INTERNAL MEDICINE

## 2024-04-03 PROCEDURE — 3074F SYST BP LT 130 MM HG: CPT | Mod: HCNC,CPTII,S$GLB, | Performed by: INTERNAL MEDICINE

## 2024-04-03 PROCEDURE — 99999 PR PBB SHADOW E&M-EST. PATIENT-LVL IV: CPT | Mod: PBBFAC,HCNC,, | Performed by: INTERNAL MEDICINE

## 2024-04-03 PROCEDURE — 1126F AMNT PAIN NOTED NONE PRSNT: CPT | Mod: HCNC,CPTII,S$GLB, | Performed by: INTERNAL MEDICINE

## 2024-04-03 PROCEDURE — 36415 COLL VENOUS BLD VENIPUNCTURE: CPT | Mod: HCNC,PO | Performed by: INTERNAL MEDICINE

## 2024-04-03 PROCEDURE — 1101F PT FALLS ASSESS-DOCD LE1/YR: CPT | Mod: HCNC,CPTII,S$GLB, | Performed by: INTERNAL MEDICINE

## 2024-04-03 PROCEDURE — 80053 COMPREHEN METABOLIC PANEL: CPT | Mod: HCNC | Performed by: INTERNAL MEDICINE

## 2024-04-03 RX ORDER — METFORMIN HYDROCHLORIDE 500 MG/1
500 TABLET ORAL
Qty: 90 TABLET | Refills: 3
Start: 2024-04-03

## 2024-04-03 NOTE — TELEPHONE ENCOUNTER
Sent p preservice intake msg for approval on external derm  Dr annamaria carcamo.    Faxed referral to them at [phone]

## 2024-04-04 PROBLEM — G31.9 DEGENERATIVE DISEASE OF NERVOUS SYSTEM, UNSPECIFIED: Status: RESOLVED | Noted: 2023-10-02 | Resolved: 2024-04-04

## 2024-04-04 NOTE — TELEPHONE ENCOUNTER
In review of her chart it is noted that Dr. Araujo stopped her metoprolol and started her on carvedilol, as these are both in the same chemical family known as beta-blockers.    Will review the Jelly drops information

## 2024-04-05 ENCOUNTER — PATIENT MESSAGE (OUTPATIENT)
Dept: CARDIOLOGY | Facility: CLINIC | Age: 84
End: 2024-04-05
Payer: MEDICARE

## 2024-04-05 RX ORDER — HYDROCHLOROTHIAZIDE 25 MG/1
12.5 TABLET ORAL DAILY
Qty: 90 TABLET | Refills: 3
Start: 2024-04-05

## 2024-04-05 NOTE — TELEPHONE ENCOUNTER
Her urine did not reveal an infection, so no antibiotic is needed at this time  Her bloodwork revealed the hemoglobin A1C has improved as has her kidney function.    The only medication change I would recommend at this time would be to take 1/2 of the HCTZ 25mg   instead of the whole tablet daily    And monitor her BP and pulse and advise of the readings in 1-2 weeks    Also, monitor her lightheadedness to see if it improves w/ the decreased dose of diuretic  And if the frequent urination diminishes    It is still important for her to drink water throughout the day

## 2024-04-05 NOTE — TELEPHONE ENCOUNTER
Please advise     Pt see the results of urine & blood work. Wants to know will there be medication change ?

## 2024-04-17 ENCOUNTER — CLINICAL SUPPORT (OUTPATIENT)
Dept: CARDIOLOGY | Facility: HOSPITAL | Age: 84
End: 2024-04-17
Payer: MEDICARE

## 2024-04-17 ENCOUNTER — CLINICAL SUPPORT (OUTPATIENT)
Dept: CARDIOLOGY | Facility: HOSPITAL | Age: 84
End: 2024-04-17
Attending: INTERNAL MEDICINE
Payer: MEDICARE

## 2024-04-17 DIAGNOSIS — Z95.818 PRESENCE OF OTHER CARDIAC IMPLANTS AND GRAFTS: ICD-10-CM

## 2024-04-17 PROCEDURE — 93298 REM INTERROG DEV EVAL SCRMS: CPT | Mod: 26,HCNC,, | Performed by: INTERNAL MEDICINE

## 2024-04-19 LAB
OHS CV AF BURDEN PERCENT: < 1
OHS CV DC REMOTE DEVICE TYPE: NORMAL

## 2024-04-29 RX ORDER — CARVEDILOL 6.25 MG/1
6.25 TABLET ORAL 2 TIMES DAILY WITH MEALS
Qty: 20 TABLET | Refills: 0 | Status: SHIPPED | OUTPATIENT
Start: 2024-04-29 | End: 2024-05-07 | Stop reason: SDUPTHER

## 2024-04-29 RX ORDER — CARVEDILOL 6.25 MG/1
6.25 TABLET ORAL 2 TIMES DAILY WITH MEALS
Qty: 180 TABLET | Refills: 3 | Status: CANCELLED | OUTPATIENT
Start: 2024-04-29

## 2024-04-29 NOTE — TELEPHONE ENCOUNTER
Spoke to pt son and inform him prescription for Carvedilol has  been sent to local pharmacy as requested with 20 tablet. Pt son vu and will call center well for pt other prescription.

## 2024-04-29 NOTE — TELEPHONE ENCOUNTER
----- Message from Amy Sampson sent at 4/29/2024 12:07 PM CDT -----  Contact: [phone]  Requesting an RX refill or new RX.  Is this a refill or new RX: refill  RX name and strength :  carvediloL (COREG) 6.25 MG tablet 180 tablet  Is this a 30 day or 90 day RX: 90  Pharmacy name and phone #   [facility] - [place], [place] - [address]  Phone: [phone]  Fax: [phone]       Patient  is out of her medication

## 2024-04-29 NOTE — TELEPHONE ENCOUNTER
The patient is out of Coreg. Has refills at [facility]. The son informed to call [facility] and request refill.    He will call and request medication. In the meantime can you send a weeks supply to the local pharmacy.

## 2024-04-29 NOTE — TELEPHONE ENCOUNTER
----- Message from Beckie Pretty sent at 4/27/2024 11:21 AM CDT -----  Contact: [phone]  Requesting an RX refill or new RX.  Is this a refill or new RX:  Refill 1  RX name and strength (copy/paste from chart):  carvediloL (COREG) 6.25 MG tablet  Is this a 30 day or 90 day RX: 90 days  Pharmacy name and phone # (copy/paste from chart):  [facility] - [facility] [address]   Phone: [phone]  Fax: [phone]        The doctors have asked that we provide their patients with the following 2 reminders -- prescription refills can take up to 72 hours, and a friendly reminder that in the future you can use your [facility] account to request refills:       Patient called, requested a courtesy from Dr Cabrales's nurse. Please call and advise. Thank you.

## 2024-05-07 RX ORDER — CARVEDILOL 6.25 MG/1
6.25 TABLET ORAL 2 TIMES DAILY WITH MEALS
Qty: 180 TABLET | Refills: 3 | Status: SHIPPED | OUTPATIENT
Start: 2024-05-07

## 2024-05-07 NOTE — TELEPHONE ENCOUNTER
----- Message from Jelly Castillo sent at 5/7/2024 12:02 PM CDT -----  Type: Needs Medical Advice  Who Called:  [employer]     Best Call Back Number: [phone]  fax [phone]   Additional Information: calling checking on the status of pt script carvediloL (COREG) 6.25 MG tablet please advise

## 2024-05-22 ENCOUNTER — CLINICAL SUPPORT (OUTPATIENT)
Dept: CARDIOLOGY | Facility: HOSPITAL | Age: 84
End: 2024-05-22
Payer: MEDICARE

## 2024-05-22 ENCOUNTER — CLINICAL SUPPORT (OUTPATIENT)
Dept: CARDIOLOGY | Facility: HOSPITAL | Age: 84
End: 2024-05-22
Attending: INTERNAL MEDICINE
Payer: MEDICARE

## 2024-05-22 DIAGNOSIS — Z95.818 PRESENCE OF OTHER CARDIAC IMPLANTS AND GRAFTS: ICD-10-CM

## 2024-05-22 PROCEDURE — 93298 REM INTERROG DEV EVAL SCRMS: CPT | Performed by: INTERNAL MEDICINE

## 2024-05-22 PROCEDURE — 93298 REM INTERROG DEV EVAL SCRMS: CPT | Mod: 26,,, | Performed by: INTERNAL MEDICINE

## 2024-05-30 LAB
OHS CV AF BURDEN PERCENT: < 1
OHS CV DC REMOTE DEVICE TYPE: NORMAL

## 2024-06-19 RX ORDER — AMLODIPINE BESYLATE 10 MG/1
10 TABLET ORAL
Qty: 90 TABLET | Refills: 3 | Status: SHIPPED | OUTPATIENT
Start: 2024-06-19

## 2024-06-26 ENCOUNTER — CLINICAL SUPPORT (OUTPATIENT)
Dept: CARDIOLOGY | Facility: HOSPITAL | Age: 84
End: 2024-06-26
Attending: INTERNAL MEDICINE
Payer: MEDICARE

## 2024-06-26 ENCOUNTER — CLINICAL SUPPORT (OUTPATIENT)
Dept: CARDIOLOGY | Facility: HOSPITAL | Age: 84
End: 2024-06-26
Payer: MEDICARE

## 2024-06-26 DIAGNOSIS — I63.9 CEREBRAL INFARCTION, UNSPECIFIED: ICD-10-CM

## 2024-06-26 LAB
OHS CV AF BURDEN PERCENT: < 1
OHS CV DC REMOTE DEVICE TYPE: NORMAL

## 2024-06-26 PROCEDURE — 93298 REM INTERROG DEV EVAL SCRMS: CPT | Mod: HCNC | Performed by: INTERNAL MEDICINE

## 2024-07-31 ENCOUNTER — CLINICAL SUPPORT (OUTPATIENT)
Dept: CARDIOLOGY | Facility: HOSPITAL | Age: 84
End: 2024-07-31
Payer: MEDICARE

## 2024-07-31 ENCOUNTER — CLINICAL SUPPORT (OUTPATIENT)
Dept: CARDIOLOGY | Facility: HOSPITAL | Age: 84
End: 2024-07-31
Attending: INTERNAL MEDICINE
Payer: MEDICARE

## 2024-07-31 DIAGNOSIS — I63.9 CEREBRAL INFARCTION, UNSPECIFIED: ICD-10-CM

## 2024-07-31 PROCEDURE — 93298 REM INTERROG DEV EVAL SCRMS: CPT | Mod: HCNC | Performed by: INTERNAL MEDICINE

## 2024-08-12 ENCOUNTER — PATIENT MESSAGE (OUTPATIENT)
Dept: INTERNAL MEDICINE | Facility: CLINIC | Age: 84
End: 2024-08-12
Payer: MEDICARE

## 2024-08-12 DIAGNOSIS — E11.59 HYPERTENSION ASSOCIATED WITH DIABETES: ICD-10-CM

## 2024-08-12 DIAGNOSIS — E11.22 CKD STAGE 3 SECONDARY TO DIABETES: ICD-10-CM

## 2024-08-12 DIAGNOSIS — I15.2 HYPERTENSION ASSOCIATED WITH DIABETES: ICD-10-CM

## 2024-08-12 DIAGNOSIS — R82.90 ABNORMAL URINE: ICD-10-CM

## 2024-08-12 DIAGNOSIS — N18.30 CKD STAGE 3 SECONDARY TO DIABETES: ICD-10-CM

## 2024-08-12 DIAGNOSIS — R42 DIZZINESS: ICD-10-CM

## 2024-08-12 DIAGNOSIS — D64.9 ANEMIA, UNSPECIFIED TYPE: ICD-10-CM

## 2024-08-12 DIAGNOSIS — E11.69 HYPERLIPIDEMIA ASSOCIATED WITH TYPE 2 DIABETES MELLITUS: Primary | ICD-10-CM

## 2024-08-12 DIAGNOSIS — E04.2 NONTOXIC MULTINODULAR GOITER: ICD-10-CM

## 2024-08-12 DIAGNOSIS — E78.5 HYPERLIPIDEMIA ASSOCIATED WITH TYPE 2 DIABETES MELLITUS: Primary | ICD-10-CM

## 2024-08-13 ENCOUNTER — TELEPHONE (OUTPATIENT)
Dept: INTERNAL MEDICINE | Facility: CLINIC | Age: 84
End: 2024-08-13
Payer: MEDICARE

## 2024-08-13 NOTE — TELEPHONE ENCOUNTER
----- Message from Gamal Petersen sent at 8/12/2024  1:38 PM CDT -----  Contact: Pt [phone]  Pt called in regards to medication fenofibrate micronized (LOFIBRA) 134 MG Cap pt states the cost went up and also medication looks different and the color is not the same please advise

## 2024-08-13 NOTE — TELEPHONE ENCOUNTER
Called and spoke to pt kate Monetith and schedule labs on 8/14/24 at [place] location as requested at 11:45 am.

## 2024-08-13 NOTE — TELEPHONE ENCOUNTER
Pt son called and accepted appointment with  on 8/19/24 at 1:30 pm due to dizziness and weakness. Lab orders

## 2024-08-13 NOTE — TELEPHONE ENCOUNTER
----- Message from Gamal Petersen sent at 8/12/2024  1:44 PM CDT -----  Contact: Pt  [phone]  1MEDICALADVICE     Patient is calling for Medical Advice regarding: Dizziness frequent urination     How long has patient had these symptoms: 2 weeks     Pharmacy name and phone#:[facility] [address] - [address]   Phone: [phone]  Fax: [phone]      Patient wants a call back or thru [facility]:Callback     Comments:Pt prefers to see a female provider on Thursdays     Please advise patient replies from provider may take up to 48 hours.

## 2024-08-15 ENCOUNTER — LAB VISIT (OUTPATIENT)
Dept: LAB | Facility: HOSPITAL | Age: 84
End: 2024-08-15
Payer: MEDICARE

## 2024-08-15 DIAGNOSIS — E11.69 HYPERLIPIDEMIA ASSOCIATED WITH TYPE 2 DIABETES MELLITUS: ICD-10-CM

## 2024-08-15 DIAGNOSIS — D64.9 ANEMIA, UNSPECIFIED TYPE: ICD-10-CM

## 2024-08-15 DIAGNOSIS — E04.2 NONTOXIC MULTINODULAR GOITER: ICD-10-CM

## 2024-08-15 DIAGNOSIS — E11.59 HYPERTENSION ASSOCIATED WITH DIABETES: ICD-10-CM

## 2024-08-15 DIAGNOSIS — E78.5 HYPERLIPIDEMIA ASSOCIATED WITH TYPE 2 DIABETES MELLITUS: ICD-10-CM

## 2024-08-15 DIAGNOSIS — I15.2 HYPERTENSION ASSOCIATED WITH DIABETES: ICD-10-CM

## 2024-08-15 DIAGNOSIS — N18.30 CKD STAGE 3 SECONDARY TO DIABETES: ICD-10-CM

## 2024-08-15 DIAGNOSIS — E11.22 CKD STAGE 3 SECONDARY TO DIABETES: ICD-10-CM

## 2024-08-15 LAB
ALBUMIN SERPL BCP-MCNC: 3.9 G/DL (ref 3.5–5.2)
ALP SERPL-CCNC: 32 U/L (ref 55–135)
ALT SERPL W/O P-5'-P-CCNC: 15 U/L (ref 10–44)
ANION GAP SERPL CALC-SCNC: 13 MMOL/L (ref 8–16)
AST SERPL-CCNC: 18 U/L (ref 10–40)
BASOPHILS # BLD AUTO: 0.05 K/UL (ref 0–0.2)
BASOPHILS NFR BLD: 0.6 % (ref 0–1.9)
BILIRUB SERPL-MCNC: 0.4 MG/DL (ref 0.1–1)
BUN SERPL-MCNC: 30 MG/DL (ref 8–23)
CALCIUM SERPL-MCNC: 9.9 MG/DL (ref 8.7–10.5)
CHLORIDE SERPL-SCNC: 104 MMOL/L (ref 95–110)
CO2 SERPL-SCNC: 22 MMOL/L (ref 23–29)
CREAT SERPL-MCNC: 1.3 MG/DL (ref 0.5–1.4)
DIFFERENTIAL METHOD BLD: ABNORMAL
EOSINOPHIL # BLD AUTO: 0.1 K/UL (ref 0–0.5)
EOSINOPHIL NFR BLD: 1.7 % (ref 0–8)
ERYTHROCYTE [DISTWIDTH] IN BLOOD BY AUTOMATED COUNT: 12.6 % (ref 11.5–14.5)
EST. GFR  (NO RACE VARIABLE): 40.8 ML/MIN/1.73 M^2
ESTIMATED AVG GLUCOSE: 163 MG/DL (ref 68–131)
GLUCOSE SERPL-MCNC: 164 MG/DL (ref 70–110)
HBA1C MFR BLD: 7.3 % (ref 4–5.6)
HCT VFR BLD AUTO: 34.7 % (ref 37–48.5)
HGB BLD-MCNC: 11.7 G/DL (ref 12–16)
IMM GRANULOCYTES # BLD AUTO: 0.02 K/UL (ref 0–0.04)
IMM GRANULOCYTES NFR BLD AUTO: 0.3 % (ref 0–0.5)
LYMPHOCYTES # BLD AUTO: 2.4 K/UL (ref 1–4.8)
LYMPHOCYTES NFR BLD: 29.9 % (ref 18–48)
MCH RBC QN AUTO: 29.6 PG (ref 27–31)
MCHC RBC AUTO-ENTMCNC: 33.7 G/DL (ref 32–36)
MCV RBC AUTO: 88 FL (ref 82–98)
MONOCYTES # BLD AUTO: 0.7 K/UL (ref 0.3–1)
MONOCYTES NFR BLD: 8.9 % (ref 4–15)
NEUTROPHILS # BLD AUTO: 4.6 K/UL (ref 1.8–7.7)
NEUTROPHILS NFR BLD: 58.6 % (ref 38–73)
NRBC BLD-RTO: 0 /100 WBC
OHS CV AF BURDEN PERCENT: < 1
OHS CV DC REMOTE DEVICE TYPE: NORMAL
PLATELET # BLD AUTO: 227 K/UL (ref 150–450)
PMV BLD AUTO: 11 FL (ref 9.2–12.9)
POTASSIUM SERPL-SCNC: 4.2 MMOL/L (ref 3.5–5.1)
PROT SERPL-MCNC: 6.5 G/DL (ref 6–8.4)
RBC # BLD AUTO: 3.95 M/UL (ref 4–5.4)
SODIUM SERPL-SCNC: 139 MMOL/L (ref 136–145)
TSH SERPL DL<=0.005 MIU/L-ACNC: 2.32 UIU/ML (ref 0.4–4)
WBC # BLD AUTO: 7.86 K/UL (ref 3.9–12.7)

## 2024-08-15 PROCEDURE — 36415 COLL VENOUS BLD VENIPUNCTURE: CPT | Mod: HCNC,PO | Performed by: INTERNAL MEDICINE

## 2024-08-15 PROCEDURE — 83036 HEMOGLOBIN GLYCOSYLATED A1C: CPT | Mod: HCNC | Performed by: INTERNAL MEDICINE

## 2024-08-15 PROCEDURE — 80053 COMPREHEN METABOLIC PANEL: CPT | Mod: HCNC | Performed by: INTERNAL MEDICINE

## 2024-08-15 PROCEDURE — 84443 ASSAY THYROID STIM HORMONE: CPT | Mod: HCNC | Performed by: INTERNAL MEDICINE

## 2024-08-15 PROCEDURE — 85025 COMPLETE CBC W/AUTO DIFF WBC: CPT | Mod: HCNC | Performed by: INTERNAL MEDICINE

## 2024-08-16 ENCOUNTER — TELEPHONE (OUTPATIENT)
Dept: INTERNAL MEDICINE | Facility: CLINIC | Age: 84
End: 2024-08-16
Payer: MEDICARE

## 2024-08-16 NOTE — TELEPHONE ENCOUNTER
Noted     Please review your lab work and we will discuss at your pending office visit.   valentín

## 2024-08-16 NOTE — TELEPHONE ENCOUNTER
----- Message from Karina Wilson MD sent at 8/16/2024 10:00 AM CDT -----  Please review your lab work and we will discuss at your pending office visit.   valentín

## 2024-08-19 ENCOUNTER — OFFICE VISIT (OUTPATIENT)
Dept: INTERNAL MEDICINE | Facility: CLINIC | Age: 84
End: 2024-08-19
Payer: MEDICARE

## 2024-08-19 VITALS
HEART RATE: 70 BPM | SYSTOLIC BLOOD PRESSURE: 140 MMHG | TEMPERATURE: 98 F | DIASTOLIC BLOOD PRESSURE: 58 MMHG | WEIGHT: 168 LBS | OXYGEN SATURATION: 99 % | RESPIRATION RATE: 18 BRPM | HEIGHT: 66 IN | BODY MASS INDEX: 27 KG/M2

## 2024-08-19 DIAGNOSIS — R35.0 URINARY FREQUENCY: ICD-10-CM

## 2024-08-19 DIAGNOSIS — E11.59 HYPERTENSION ASSOCIATED WITH DIABETES: ICD-10-CM

## 2024-08-19 DIAGNOSIS — I15.2 HYPERTENSION ASSOCIATED WITH DIABETES: ICD-10-CM

## 2024-08-19 DIAGNOSIS — M79.602 LEFT ARM PAIN: ICD-10-CM

## 2024-08-19 DIAGNOSIS — R42 DIZZINESS: Primary | ICD-10-CM

## 2024-08-19 LAB
BILIRUB SERPL-MCNC: NEGATIVE MG/DL
BLOOD URINE, POC: NEGATIVE
CLARITY, POC UA: CLEAR
COLOR, POC UA: NORMAL
GLUCOSE UR QL STRIP: NORMAL
KETONES UR QL STRIP: NEGATIVE
LEUKOCYTE ESTERASE URINE, POC: NEGATIVE
NITRITE, POC UA: NEGATIVE
PH, POC UA: 6
PROTEIN, POC: NEGATIVE
SPECIFIC GRAVITY, POC UA: 1005
UROBILINOGEN, POC UA: NORMAL

## 2024-08-19 PROCEDURE — 3078F DIAST BP <80 MM HG: CPT | Mod: HCNC,CPTII,GC,S$GLB

## 2024-08-19 PROCEDURE — 99999 PR PBB SHADOW E&M-EST. PATIENT-LVL V: CPT | Mod: PBBFAC,HCNC,GC,

## 2024-08-19 PROCEDURE — 1159F MED LIST DOCD IN RCRD: CPT | Mod: HCNC,CPTII,GC,S$GLB

## 2024-08-19 PROCEDURE — 3077F SYST BP >= 140 MM HG: CPT | Mod: HCNC,CPTII,GC,S$GLB

## 2024-08-19 PROCEDURE — 99214 OFFICE O/P EST MOD 30 MIN: CPT | Mod: HCNC,GC,S$GLB,

## 2024-08-19 PROCEDURE — 81002 URINALYSIS NONAUTO W/O SCOPE: CPT | Mod: HCNC,GC,S$GLB,

## 2024-08-19 PROCEDURE — 1101F PT FALLS ASSESS-DOCD LE1/YR: CPT | Mod: HCNC,CPTII,GC,S$GLB

## 2024-08-19 PROCEDURE — 3288F FALL RISK ASSESSMENT DOCD: CPT | Mod: HCNC,CPTII,GC,S$GLB

## 2024-08-19 PROCEDURE — 1126F AMNT PAIN NOTED NONE PRSNT: CPT | Mod: HCNC,CPTII,GC,S$GLB

## 2024-08-19 RX ORDER — CARVEDILOL 6.25 MG/1
6.25 TABLET ORAL 2 TIMES DAILY WITH MEALS
Qty: 180 TABLET | Refills: 3 | Status: SHIPPED | OUTPATIENT
Start: 2024-08-19

## 2024-08-19 NOTE — Clinical Note
Chelo Archibald, Saw our mutual pt today and she was hoping to see you this fall.  I have lab ordered for October if you have something you want to add. She c/o of dizziness but her BP was okay and she didn't drop her systolic w/ standing. And the resident reviewed her loop info and no surprises there.  Let me know if you have any other thoughts.  Karina Farr

## 2024-08-19 NOTE — PROGRESS NOTES
Subjective     Chief Complaint: Dizziness and weakness    History of Present Illness:  Ms. Zaida Liu is a 84 y.o. female with hx of T2DM, HTN, obesity, dyslipidemia, CVA w/ mild cognitive deficits, and CKD Stage 3 who presents to clinic today for evaluation of dizziness, weakness, and frequent urination. She mentions that she has been urinating more often than usual. She states that she drinks a lot of water and also has a lot of urine output. She urinates around 6-7 x/day. She also wears diapers and pads due to frequency of urination. Patient is on jardiance. She also mentions that she is having some left arm pain that began 2 weeks ago. She says that it hurts the most at night. She has not tried any medications or compresses to help with the pain. She also states that she has been feeling dizzy. She mentions it is the most bothersome when she goes from a sitting to standing position.     Review of Systems   Constitutional:  Negative for chills and fever.   HENT:  Negative for ear discharge and hearing loss.    Eyes:  Negative for redness.   Respiratory:  Negative for cough and wheezing.    Cardiovascular:  Negative for chest pain, palpitations and leg swelling.   Genitourinary:  Positive for frequency. Negative for dysuria and hematuria.   Musculoskeletal:  Positive for myalgias.   Neurological:  Positive for dizziness and weakness. Negative for tremors and headaches.   Psychiatric/Behavioral:  Negative for depression.        PAST HISTORY:     Past Medical History:   Diagnosis Date    Aortic stenosis     moderate to severe    Cryptogenic stroke 7/30/2023    Disorder of kidney and ureter     DVT (deep venous thrombosis)     postoperatively    Hemorrhoids     History of DVT of lower extremity     s/p surgery    Obesity, diabetes, and hypertension syndrome     Positive PPD, treated     PPD positive, treated     hosp x 1 yr, treated x 5 yrs- child    Sciatica     Stenosis of aortic and mitral valves     Thyroid  disease     Type 2 diabetes mellitus with circulatory disorder 10/14/2016    Urinary incontinence        Past Surgical History:   Procedure Laterality Date    AORTIC VALVE REPLACEMENT  8/25/2015    Aortic valve replacement with a 21-mm St. Jameel pericardial valve tissue via upper dominguez-sternotomy.    BREAST SURGERY      breast reduction    CHOLECYSTECTOMY      COLONOSCOPY N/A 9/21/2020    Procedure: COLONOSCOPY;  Surgeon: Maik Arango MD;  Location: [facility] ENDO;  Service: Endoscopy;  Laterality: N/A;    INSERTION OF IMPLANTABLE LOOP RECORDER Left 7/31/2023    Procedure: Insertion, Implantable Loop Recorder;  Surgeon: Harsh Araujo MD;  Location: [facility] EP LAB;  Service: Cardiology;  Laterality: Left;  CVA, ILR, BSCI, Local, NM, 3 Prep    TONSILLECTOMY      TOTAL REDUCTION MAMMOPLASTY      TUBAL LIGATION         Family History   Problem Relation Name Age of Onset    Heart disease Mother          CHF    Heart failure Mother          d. 85    Hypertension Mother      Heart disease Father          d. 61    Alcohol abuse Father      Stroke Father      Hypertension Father      Lymphoma Sister Saniya         on immunosuppresant    Rheum arthritis Sister Saniya         d.80    Cancer Sister Saniya         lymphoma    Heart disease Sister Afshan     Stroke Sister Afshan         mild    Other Sister Afshan         frequent urination    Colon cancer Sister Le         d. 81    Heart disease Brother Kash schaffer. 82    Heart disease Brother Rodrick         d. 60    Diabetes Brother Rodrick     No Known Problems Daughter Echo         lives in [facility], 2nd m pending    No Known Problems Son Alphonso         lives in [place], 15 yo son, 22 yo dtr- 2019    Hypertension Son Deandre         lives in [facility], 18yo son (2023)    Breast cancer Cousin      Cancer Cousin          breast    Breast cancer Other neice     Cancer Other neice         breast    Colon cancer Other neice     Heart attack Neg Hx       Hyperlipidemia Neg Hx         Social History     Socioeconomic History    Marital status:    Tobacco Use    Smoking status: Former     Current packs/day: 0.00     Types: Cigarettes     Quit date: 10/14/1966     Years since quittin.8    Smokeless tobacco: Never   Substance and Sexual Activity    Alcohol use: Yes     Alcohol/week: 2.0 standard drinks of alcohol     Types: 2 Glasses of wine per week     Comment: 1-2 glasses on wine on weekend    Drug use: No    Sexual activity: Not Currently     Partners: Male   Social History Narrative          Spouse retired, first spouse  when he was 37.          3 children,  2 sons, and daughter,      son with hypertension.  No heart disease as of yet.      Her spouse has hypertension, AFib and memory      issues, but apparently since he has retired they are doing better.           FHX:    Bro 82, d. massive MI, + DM,s/p amputation LE limb    Sis , new dx cerebrovascular ds, s/p stent x2 in cerebral carotid               Social Determinants of Health     Financial Resource Strain: Low Risk  (2024)    Overall Financial Resource Strain (CARDIA)     Difficulty of Paying Living Expenses: Not hard at all   Food Insecurity: No Food Insecurity (2024)    Hunger Vital Sign     Worried About Running Out of Food in the Last Year: Never true     Ran Out of Food in the Last Year: Never true   Transportation Needs: No Transportation Needs (2021)    PRAPARE - Transportation     Lack of Transportation (Medical): No     Lack of Transportation (Non-Medical): No   Physical Activity: Unknown (2024)    Exercise Vital Sign     Days of Exercise per Week: 0 days   Stress: No Stress Concern Present (2024)    [employer] - Occupational Stress Questionnaire     Feeling of Stress : Not at all   Housing Stability: Low Risk  (2021)    Housing Stability Vital Sign     Unable to Pay for Housing in the Last Year: No     Number of Places  "Lived in the Last Year: 1     Unstable Housing in the Last Year: No       MEDICATIONS & ALLERGIES:     Current Outpatient Medications on File Prior to Visit   Medication Sig    amLODIPine (NORVASC) 10 MG tablet TAKE 1 TABLET EVERY DAY    aspirin (ECOTRIN) 81 MG EC tablet Take 81 mg by mouth once daily.    B-complex with vitamin C (VITAMIN B COMPLEX-C ORAL) Take by mouth. Vitamin b3    empagliflozin (JARDIANCE) 10 mg tablet Take 1 tablet (10 mg total) by mouth once daily.    ezetimibe (ZETIA) 10 mg tablet Take 1 tablet (10 mg total) by mouth once daily.    fenofibrate micronized (LOFIBRA) 134 MG Cap TAKE 1 CAPSULE DAILY WITH BREAKFAST.    hydroCHLOROthiazide (HYDRODIURIL) 25 MG tablet Take 0.5 tablets (12.5 mg total) by mouth once daily.    losartan (COZAAR) 100 MG tablet TAKE 1 TABLET EVERY DAY    metFORMIN (GLUCOPHAGE) 500 MG tablet Take 1 tablet (500 mg total) by mouth daily with breakfast.    omeprazole (PRILOSEC) 20 MG capsule Take 20 mg by mouth every morning.    rosuvastatin (CRESTOR) 40 MG Tab TAKE 1 TABLET EVERY EVENING    vitamin D 1000 units Tab Take 1,000 Units by mouth once daily.    [DISCONTINUED] carvediloL (COREG) 6.25 MG tablet Take 1 tablet (6.25 mg total) by mouth 2 (two) times daily with meals.     No current facility-administered medications on file prior to visit.       Review of patient's allergies indicates:   Allergen Reactions    Codeine Other (See Comments)       OBJECTIVE:     Vital Signs:  Vitals:    08/19/24 1337   BP: (!) 140/58   BP Location: Right arm   Patient Position: Sitting   BP Method: Large (Manual)   Pulse: 70   Resp: 18   Temp: 97.9 °F (36.6 °C)   TempSrc: Temporal   SpO2: 99%   Weight: 76.2 kg (167 lb 15.9 oz)   Height: 5' 6" (1.676 m)       Body mass index is 27.11 kg/m².     Physical Exam  Constitutional:       Appearance: Normal appearance. She is normal weight.   HENT:      Head: Normocephalic and atraumatic.      Right Ear: Tympanic membrane, ear canal and external ear " normal.      Left Ear: Tympanic membrane, ear canal and external ear normal.   Eyes:      General: Visual field deficit: 5/5 strength in bilateral arms.      Conjunctiva/sclera: Conjunctivae normal.   Cardiovascular:      Rate and Rhythm: Normal rate and regular rhythm.      Pulses: Normal pulses.      Heart sounds: Normal heart sounds.   Pulmonary:      Effort: Pulmonary effort is normal.      Breath sounds: Normal breath sounds. No wheezing.   Abdominal:      General: Abdomen is flat. Bowel sounds are normal.      Palpations: Abdomen is soft.      Tenderness: There is no abdominal tenderness. There is no guarding.   Musculoskeletal:      Left forearm: Swelling and tenderness present.      Right lower leg: No edema.      Left lower leg: No edema.   Skin:     General: Skin is warm and dry.   Neurological:      General: No focal deficit present.      Mental Status: She is alert and oriented to person, place, and time.   Psychiatric:         Mood and Affect: Mood normal.         Laboratory  No results found for this or any previous visit (from the past 24 hour(s)).    Diagnostic Results:      Health Maintenance Due   Topic Date Due    RSV Vaccine (Age 60+ and Pregnant patients) (1 - 1-dose 60+ series) Never done    Eye Exam  05/03/2023    COVID-19 Vaccine (4 - 2023-24 season) 09/01/2023    Colonoscopy  09/21/2023    TETANUS VACCINE  02/18/2024    Lipid Panel  09/06/2024    DEXA Scan  09/14/2024         ASSESSMENT & PLAN:     Ms. Zaida Liu is a 84 y.o. female who presents today for dizziness and weakness.     1. Dizziness      - Orthostatics was negative in the room       - Most likely secondary to urinary frequency because of jardiance      - Would recommend gatorade lite to keep hydrated (2ox 2x/day)    2. Urinary frequency  - POCT Urine Dipstick was normal   - Secondary to jardiance  - Continue hydration    3. Hypertension associated with diabetes, stable  -  carvediloL (COREG) 6.25 MG tablet; Take 1 tablet  (6.25 mg total) by mouth 2 (two) times daily with meals.  Dispense: 180 tablet; Refill: 3  - Continue losartan, hctz, and amlodipine     4. Left arm pain, new      - Recommend an icepack to be applied to left wrist everyday       - If icepack is not providing relief, can always try voltaren gel or a wrist splint to wear at night        Discussed with Dr. Cabrales  - staff attestation to follow      Della Chisholm DO  Internal Medicine PGY-1  [facility]  [address] [place] [address]  [phone]

## 2024-08-19 NOTE — PROGRESS NOTES
I have interviewed and examined the patient w/ the resident,   I agree w/ the impression and plan as outlined above.      Karina Cabrales MD- Staff

## 2024-08-21 ENCOUNTER — TELEPHONE (OUTPATIENT)
Dept: CARDIOLOGY | Facility: CLINIC | Age: 84
End: 2024-08-21
Payer: MEDICARE

## 2024-08-21 NOTE — TELEPHONE ENCOUNTER
Spoke with Pt son, pt has been scheduled to see Angela Collier as listed below.   Future Appointments   Date Time Provider Department Center   9/24/2024  1:30 PM Angela Collier PA-C [facility] [place]   10/8/2024  8:00 AM LAB, [facility] [place]   10/14/2024 10:30 AM Karina Wilson MD [facility] [place]

## 2024-08-30 ENCOUNTER — TELEPHONE (OUTPATIENT)
Dept: INTERNAL MEDICINE | Facility: CLINIC | Age: 84
End: 2024-08-30
Payer: MEDICARE

## 2024-08-30 NOTE — TELEPHONE ENCOUNTER
----- Message from Gamal Petersen sent at 8/30/2024  1:46 PM CDT -----  Contact: Pt  [phone]  Requesting an RX refill or new RX.    Is this a refill or new RX: Refill    RX name and strength (copy/paste from chart):empagliflozin (JARDIANCE) 10 mg tablet      Is this a 30 day or 90 day RX: 90    Pharmacy name and phone # (copy/paste from chart):  [facility] - [facility] [address]   Phone: [phone]  Fax: [phone]      The doctors have asked that we provide their patients with the following 2 reminders -- prescription refills can take up to 72 hours, and a friendly reminder that in the future you can use your [facility] account to request refills: yes    [facility] [address]  [place], [place] - [address]   Phone: [phone]  Fax: [phone]    Pt states can a weeks supply be sent in to [facility] pharmacy she only has 1 pill left

## 2024-09-04 ENCOUNTER — CLINICAL SUPPORT (OUTPATIENT)
Dept: CARDIOLOGY | Facility: HOSPITAL | Age: 84
End: 2024-09-04
Payer: MEDICARE

## 2024-09-04 ENCOUNTER — CLINICAL SUPPORT (OUTPATIENT)
Dept: CARDIOLOGY | Facility: HOSPITAL | Age: 84
End: 2024-09-04
Attending: INTERNAL MEDICINE
Payer: MEDICARE

## 2024-09-04 DIAGNOSIS — I63.9 CEREBRAL INFARCTION, UNSPECIFIED: ICD-10-CM

## 2024-09-04 PROCEDURE — 93298 REM INTERROG DEV EVAL SCRMS: CPT | Mod: HCNC | Performed by: INTERNAL MEDICINE

## 2024-09-06 ENCOUNTER — PATIENT MESSAGE (OUTPATIENT)
Dept: INTERNAL MEDICINE | Facility: CLINIC | Age: 84
End: 2024-09-06
Payer: MEDICARE

## 2024-09-09 NOTE — TELEPHONE ENCOUNTER
LOV 8/19/24  LRF 1/30/24  RTC 10/14/24      Please advise pt son stated pt is completely out and she was under the impression from last meeting that you were going to send a refill for this med to her local [facility] pharmacy. If you remember, this is the one that is very expensive now. she mention you were trying to se if they could fill it with a generic version for less?

## 2024-09-11 LAB
OHS CV AF BURDEN PERCENT: < 1
OHS CV DC REMOTE DEVICE TYPE: NORMAL

## 2024-09-13 DIAGNOSIS — E11.69 HYPERLIPIDEMIA ASSOCIATED WITH TYPE 2 DIABETES MELLITUS: ICD-10-CM

## 2024-09-13 DIAGNOSIS — E78.5 HYPERLIPIDEMIA ASSOCIATED WITH TYPE 2 DIABETES MELLITUS: ICD-10-CM

## 2024-09-13 RX ORDER — METFORMIN HYDROCHLORIDE 500 MG/1
500 TABLET ORAL 2 TIMES DAILY WITH MEALS
Qty: 180 TABLET | Refills: 3 | Status: SHIPPED | OUTPATIENT
Start: 2024-09-13

## 2024-09-13 RX ORDER — ROSUVASTATIN CALCIUM 40 MG/1
40 TABLET, COATED ORAL NIGHTLY
Qty: 90 TABLET | Refills: 3 | Status: SHIPPED | OUTPATIENT
Start: 2024-09-13

## 2024-09-24 ENCOUNTER — OFFICE VISIT (OUTPATIENT)
Dept: CARDIOLOGY | Facility: CLINIC | Age: 84
End: 2024-09-24
Payer: MEDICARE

## 2024-09-24 VITALS
HEIGHT: 66 IN | WEIGHT: 168.44 LBS | BODY MASS INDEX: 27.07 KG/M2 | SYSTOLIC BLOOD PRESSURE: 138 MMHG | HEART RATE: 66 BPM | DIASTOLIC BLOOD PRESSURE: 65 MMHG

## 2024-09-24 DIAGNOSIS — Z95.818 STATUS POST PLACEMENT OF IMPLANTABLE LOOP RECORDER: ICD-10-CM

## 2024-09-24 DIAGNOSIS — E11.59 HYPERTENSION ASSOCIATED WITH DIABETES: ICD-10-CM

## 2024-09-24 DIAGNOSIS — E11.69 HYPERLIPIDEMIA ASSOCIATED WITH TYPE 2 DIABETES MELLITUS: ICD-10-CM

## 2024-09-24 DIAGNOSIS — I70.0 AORTIC ATHEROSCLEROSIS: ICD-10-CM

## 2024-09-24 DIAGNOSIS — E78.5 HYPERLIPIDEMIA ASSOCIATED WITH TYPE 2 DIABETES MELLITUS: ICD-10-CM

## 2024-09-24 DIAGNOSIS — Z86.73 HISTORY OF CVA (CEREBROVASCULAR ACCIDENT): ICD-10-CM

## 2024-09-24 DIAGNOSIS — I15.2 HYPERTENSION ASSOCIATED WITH DIABETES: ICD-10-CM

## 2024-09-24 DIAGNOSIS — R42 DIZZINESS: Primary | ICD-10-CM

## 2024-09-24 DIAGNOSIS — I35.0 NONRHEUMATIC AORTIC VALVE STENOSIS: ICD-10-CM

## 2024-09-24 DIAGNOSIS — Z95.2 S/P AVR (AORTIC VALVE REPLACEMENT): ICD-10-CM

## 2024-09-24 PROCEDURE — 3075F SYST BP GE 130 - 139MM HG: CPT | Mod: HCNC,CPTII,S$GLB, | Performed by: PHYSICIAN ASSISTANT

## 2024-09-24 PROCEDURE — 99214 OFFICE O/P EST MOD 30 MIN: CPT | Mod: HCNC,S$GLB,, | Performed by: PHYSICIAN ASSISTANT

## 2024-09-24 PROCEDURE — 1101F PT FALLS ASSESS-DOCD LE1/YR: CPT | Mod: HCNC,CPTII,S$GLB, | Performed by: PHYSICIAN ASSISTANT

## 2024-09-24 PROCEDURE — 1159F MED LIST DOCD IN RCRD: CPT | Mod: HCNC,CPTII,S$GLB, | Performed by: PHYSICIAN ASSISTANT

## 2024-09-24 PROCEDURE — 99999 PR PBB SHADOW E&M-EST. PATIENT-LVL III: CPT | Mod: PBBFAC,HCNC,, | Performed by: PHYSICIAN ASSISTANT

## 2024-09-24 PROCEDURE — 3288F FALL RISK ASSESSMENT DOCD: CPT | Mod: HCNC,CPTII,S$GLB, | Performed by: PHYSICIAN ASSISTANT

## 2024-09-24 PROCEDURE — 1126F AMNT PAIN NOTED NONE PRSNT: CPT | Mod: HCNC,CPTII,S$GLB, | Performed by: PHYSICIAN ASSISTANT

## 2024-09-24 PROCEDURE — 3078F DIAST BP <80 MM HG: CPT | Mod: HCNC,CPTII,S$GLB, | Performed by: PHYSICIAN ASSISTANT

## 2024-09-24 RX ORDER — DICLOFENAC SODIUM 10 MG/G
2 GEL TOPICAL DAILY
COMMUNITY

## 2024-09-24 NOTE — PROGRESS NOTES
Cardiology Clinic Note  Reason for Visit: Annual follow up, dizziness  General Cardiologist: Dr. Gray     HPI:     PMHx:  Bicuspid AV s/p AVR (2015)  HTN  DMT2  Aortic atherosclerosis  Cryptogenic CVA (2022 and 2023)  S/p ILR      Zaida Liu is a 84 y.o. F, who presents accompanied by her son at today's visit.  This appointment was scheduled as an annual follow-up, and also due to concern for dizziness.  Mrs. Liu reports noticing increased frequency of dizziness over the past 6 months.  It is not necessarily associated with changes in position.  However it does sound like it can happen when she moves her head position quickly, and can certainly be associated with physical activity.  She does not have any sinus congestion issues.  Her implantable loop recorder is transmitting properly.  Her last transmission on September 4th did not reveal any arrhythmia.  She tries to drink adequate water, but sometimes struggles.  Her blood pressure is well-controlled at home, but her diastolic can occasionally be low.  She denies presyncope or falls. We discussed the possibility that some degree of her dizziness could be related to hydration and/or history of 2 CVA. Her carotid arteries were last evaluated in May of 2023 with no hemodynamically significant stenosis.      ROS:    Pertinent ROS included in HPI and below.  PMH:     Past Medical History:   Diagnosis Date    Aortic stenosis     moderate to severe    Cryptogenic stroke 7/30/2023    Disorder of kidney and ureter     DVT (deep venous thrombosis)     postoperatively    Hemorrhoids     History of DVT of lower extremity     s/p surgery    Obesity, diabetes, and hypertension syndrome     Positive PPD, treated     PPD positive, treated     hosp x 1 yr, treated x 5 yrs- child    Sciatica     Stenosis of aortic and mitral valves     Thyroid disease     Type 2 diabetes mellitus with circulatory disorder 10/14/2016    Urinary incontinence      Past Surgical  History:   Procedure Laterality Date    AORTIC VALVE REPLACEMENT  8/25/2015    Aortic valve replacement with a 21-mm St. Jameel pericardial valve tissue via upper dominguez-sternotomy.    BREAST SURGERY      breast reduction    CHOLECYSTECTOMY      COLONOSCOPY N/A 9/21/2020    Procedure: COLONOSCOPY;  Surgeon: Maik Arango MD;  Location: [facility] ENDO;  Service: Endoscopy;  Laterality: N/A;    INSERTION OF IMPLANTABLE LOOP RECORDER Left 7/31/2023    Procedure: Insertion, Implantable Loop Recorder;  Surgeon: Harsh Araujo MD;  Location: [facility] EP LAB;  Service: Cardiology;  Laterality: Left;  CVA, ILR, BSCI, Local, FL, 3 Prep    TONSILLECTOMY      TOTAL REDUCTION MAMMOPLASTY      TUBAL LIGATION       Allergies:     Review of patient's allergies indicates:   Allergen Reactions    Codeine Other (See Comments)     Medications:     Current Outpatient Medications on File Prior to Visit   Medication Sig Dispense Refill    amLODIPine (NORVASC) 10 MG tablet TAKE 1 TABLET EVERY DAY 90 tablet 3    aspirin (ECOTRIN) 81 MG EC tablet Take 81 mg by mouth once daily.      B-complex with vitamin C (VITAMIN B COMPLEX-C ORAL) Take 1 tablet by mouth once daily. Vitamin b3      canagliflozin (INVOKANA) 300 mg Tab tablet Take 1 tablet (300 mg total) by mouth once daily. 90 tablet 1    carvediloL (COREG) 6.25 MG tablet Take 1 tablet (6.25 mg total) by mouth 2 (two) times daily with meals. 180 tablet 3    diclofenac sodium (VOLTAREN) 1 % Gel Apply 2 g topically once daily.      ezetimibe (ZETIA) 10 mg tablet Take 1 tablet (10 mg total) by mouth once daily. 90 tablet 3    fenofibrate micronized (LOFIBRA) 134 MG Cap TAKE 1 CAPSULE DAILY WITH BREAKFAST. 90 capsule 3    hydroCHLOROthiazide (HYDRODIURIL) 25 MG tablet Take 0.5 tablets (12.5 mg total) by mouth once daily. 90 tablet 3    losartan (COZAAR) 100 MG tablet TAKE 1 TABLET EVERY DAY 90 tablet 3    metFORMIN (GLUCOPHAGE) 500 MG tablet TAKE 1 TABLET TWICE DAILY WITH MEALS 180 tablet 3     "omeprazole (PRILOSEC) 20 MG capsule Take 20 mg by mouth every morning.      rosuvastatin (CRESTOR) 40 MG Tab TAKE 1 TABLET EVERY EVENING 90 tablet 3    vitamin D 1000 units Tab Take 1,000 Units by mouth once daily.       No current facility-administered medications on file prior to visit.     Social History:     Social History     Tobacco Use    Smoking status: Former     Current packs/day: 0.00     Types: Cigarettes     Quit date: 10/14/1966     Years since quittin.9    Smokeless tobacco: Never   Substance Use Topics    Alcohol use: Yes     Alcohol/week: 2.0 standard drinks of alcohol     Types: 2 Glasses of wine per week     Comment: 1-2 glasses on wine on weekend     Family History:     Family History   Problem Relation Name Age of Onset    Heart disease Mother          CHF    Heart failure Mother          d. 85    Hypertension Mother      Heart disease Father          d. 61    Alcohol abuse Father      Stroke Father      Hypertension Father      Lymphoma Sister Saniya         on immunosuppresant    Rheum arthritis Sister Saniya         d.80    Cancer Sister Saniya         lymphoma    Heart disease Sister Afshan     Stroke Sister Afshan         mild    Other Sister Afshan         frequent urination    Colon cancer Sister Le         d. 81    Heart disease Brother Kash wang 82    Heart disease Brother Rodrick schaffer. 60    Diabetes Brother Rodrick     No Known Problems Daughter Echo         lives in [facility], 2nd m pending    No Known Problems Son Alphonso         lives in [place], 15 yo son, 22 yo dtr- 2019    Hypertension Son Deandre         lives in [facility], 18yo son ()    Breast cancer Cousin      Cancer Cousin          breast    Breast cancer Other neice     Cancer Other neice         breast    Colon cancer Other neice     Heart attack Neg Hx      Hyperlipidemia Neg Hx       Physical Exam:   /65   Pulse 66   Ht 5' 6" (1.676 m)   Wt 76.4 kg (168 lb " 6.9 oz)   BMI 27.19 kg/m²      Physical Exam     Labs:     Blood Tests:  Lab Results   Component Value Date    BNP 28 12/15/2015     08/15/2024    K 4.2 08/15/2024     08/15/2024    CO2 22 (L) 08/15/2024    BUN 30 (H) 08/15/2024    CREATININE 1.3 08/15/2024     (H) 08/15/2024    HGBA1C 7.3 (H) 08/15/2024    MG 1.5 (L) 12/15/2015    AST 18 08/15/2024    ALT 15 08/15/2024    ALBUMIN 3.9 08/15/2024    PROT 6.5 08/15/2024    BILITOT 0.4 08/15/2024    WBC 7.86 08/15/2024    HGB 11.7 (L) 08/15/2024    HCT 34.7 (L) 08/15/2024    HCT 28 (L) 2015    MCV 88 08/15/2024     08/15/2024    INR 1.1 2023    INR 1.0 12/15/2015    TSH 2.318 08/15/2024       Lab Results   Component Value Date    CHOL 147 2023    HDL 45 2023    TRIG 106 2023       Lab Results   Component Value Date    LDLCALC 80.8 2023         Imaging:     Echocardiogram  TTE 2022  Mild left atrial enlargement.  The left ventricle is normal in size with normal systolic function.  The estimated ejection fraction is 65%.  Grade I left ventricular diastolic dysfunction.  Normal right ventricular size with normal right ventricular systolic function.  There is a 21 mm St. Jameel pericardial bioprosthetic aortic valve present. There is no aortic insufficiency present. Prosthetic aortic valve is normal.  The aortic valve mean gradient is 10 mmHg with a dimensionless index of 0.54.  Normal central venous pressure (3 mmHg).  The estimated PA systolic pressure is 29 mmHg.  There is no pulmonary hypertension.    Stress testing  None    Cath Lab  None    Other  None    EK/3/2023  Normal sinus rhythm   Possible Left atrial enlargement   Nonspecific ST abnormality   T wave abnormality, consider lateral ischemia     Assessment:     1. Dizziness    2. History of CVA (cerebrovascular accident)    3. Status post placement of implantable loop recorder    4. Aortic atherosclerosis    5. Hyperlipidemia associated  with type 2 diabetes mellitus    6. Nonrheumatic aortic valve stenosis    7. S/P AVR (aortic valve replacement)    8. Hypertension associated with diabetes        Plan:     Dizziness  Recommended increased hydration.   Aim for at least 40 oz per day   Reassured that no cardiac arrhythmia has been detected thus far    History of CVA (cerebrovascular accident)  Status post placement of implantable loop recorder  Follows with Dr. Araujo in [place]  No abnormal rhythm has been detected thus far     Aortic atherosclerosis  Continue aspirin and stain     Hyperlipidemia associated with type 2 diabetes mellitus  Check FLP     Nonrheumatic aortic valve stenosis  S/P AVR (aortic valve replacement)  Valve will be 10 years post replacement in 2025, repeat echo next year and annually thereafter     Hypertension associated with diabetes  Well controlled, continue current medications         Signed:  Angela Collier PA-C  Cardiology     9/24/2024 12:49 PM    Follow-up:     Future Appointments   Date Time Provider Department Center   10/8/2024  8:00 AM LAB, [facility] [place]   10/14/2024 10:30 AM Karina Wilson MD [facility]

## 2024-10-06 NOTE — PROGRESS NOTES
84 y.o. female presents for Annual Physical Exam ( son +)    Former smoker, distant (quit 1966)  Social ETOH    HEALTH MAINTENANCE:  Cholesterol/labs: reviewed  HIV: UTD  HCV: UTD  C-scope: 2020 w/ 4 colon polyps,2 hyperplastic, 2 tubular adenomas  - rec was 3 yr f/up; pt on Plavix and plan to d/c 11/2023, unless Cards feels longer tx needed; consider c-scope 2024  Mammo: pending, but will wait for implantable loop to be removed  - reviewed 2022 mammo , minimal fibroglandular tissuse, pt to perform monthly SBE and advise of any findings  DEXA: 7/2021- normal  PAP: aged out  EYE exam:due  VACCINATIONS:    VACCINATIONS:   Shingrix, 4/2022, 2/2022   Tdap, 2/2014.     Pneumovax, 2006   Prevnar,  2015    Flu, yearly    Covid: 2/2 , + booster                  DM tx w/metformin 500mg BID and invokana 300mg( pt had concerns for side effects, frequent urination and son reported $$, although had switched from Jardiance for $$$)  Denied increased thirst, urination, or hypoglycemia episodes  A1C ==>    Lab Results   Component Value Date    LABA1C 7.5 02/21/2022    HGBA1C 7.3 (H) 08/15/2024         HTN tx w/amlodipine 10mg qd, HCTZ 25mg qd, carvedilol 6.25mg BID,   and losartan 100mg qd  Denied HA or dizziness  BP today==>136/52    Dyslipidemia tx w/rosuvastatin 40mg, Zetia 10mg, and fenofibrate 134mg  Denied unusual muscle pain or chest pain  LDL==>  Lab Results   Component Value Date    CHOL 147 09/06/2023    CHOL 160 06/12/2023    CHOL 135 03/01/2023     Lab Results   Component Value Date    HDL 45 09/06/2023    HDL 51 06/12/2023    HDL 47 03/01/2023     Lab Results   Component Value Date    LDLCALC 80.8 09/06/2023    LDLCALC 77.8 06/12/2023    LDLCALC 63.8 03/01/2023     Lab Results   Component Value Date    TRIG 106 09/06/2023    TRIG 156 (H) 06/12/2023    TRIG 121 03/01/2023     Lab Results   Component Value Date    CHOLHDL 30.6 09/06/2023    CHOLHDL 31.9 06/12/2023    CHOLHDL 34.8 03/01/2023     CVA, chronic ischemic left  MCA stroke w/ memory loss  S/p thrombectomy  + expressive aphasia, mild  + memory loss, mild    ROS:  No fever, chills, or night sweats  No blurry vision or visual disturbance  No dysphagia or early satiety  No palpitations or tachycardia  No cough or wheezing  No GERD or abdominal pain  No numbness or focal deficits  ADL's: 80-90% back s/p CVA   Memory: impaired  Mental Health: Good- 18 yo grandson lives w/ her in 2 story home, but able to live on 1st floor if necessary    AD's: + will, ?living will, and POA(children)  Nutrition: Good  Safety: intact  Gait: no recent falls  Urinary incontinence: + dysuria  Remainder of review negative except as previously noted    PAST MEDICAL HX: Reviewed  PAST SURGICAL HX: Reviewed  SOCIAL HX: Reviewed  FAMILY HX: Reviewed    PHYSICAL EXAM:  VS: As noted  GENERAL: WDWN, A&O, NAD, conversant and co-operative  EYES: Conj/lids unremarkable, sclera anicteric  NECK: Supple w/o lymphadenopathy or thyromegaly  RESPIRATORY: Efforts are unlabored. Lungs CTAP  CARDIOVASCULAR: Heart RRR. No carotid bruits noted.   + pedal pulses.   1-2+ LE edema(son notes exacerbated with increased amlodipine and patient reports lower extremity edema improves overnight with elevation)  GASTROINTESTINAL: BS+, soft , NT/ND, no HSM noted  MUSCULOSKELETAL: Gait normal. No CCE  NEUROLOGIC: VIEIRA. No tremor noted  SKIN: Warm and dry    IMPRESSION/PLAN:  Annual Physical  DM w/ microalbuminemia/CKD 3a,stable  -continue metformin 500 mg qd.  -DECREASE Invokana 100 mg q.day (called son/pt and advised)  -continue diabetic diet- need to be very strict w/ med changes  -continue to stay well hydrated, water best option  HTN associated DM, elevated  ((Home BP range 107-147.<70 (3/29 BP readings elevated))  LV DD, stable  -continue amlodipine 10 mg   -continue HCTZ 25mg qd  -continue losartan 100mg qg  -continue carvedilol 6.25mg BID  -continue low salt diet  HLD associated DM,  -continue rosuvastatin 40mg  -continue Zetia  10mg   -continue low-fat diet  CVA stroke  Carotid ds  Aortic atherosclerosis  Expressive aphasia  Degenerative system-nervous system    HM  -c-scope on hold  -mammo on hold  -DEXA due  -reviewed vaccines recs  -eye exam pending  -RTC 6 mos  -lab 3 mos, recheck A1C, no appt needed    Wt Readings from Last 5 Encounters:   10/14/24 75.4 kg (166 lb 3.6 oz)   09/24/24 76.4 kg (168 lb 6.9 oz)   08/19/24 76.2 kg (167 lb 15.9 oz)   04/03/24 76 kg (167 lb 8.8 oz)   11/03/23 77.2 kg (170 lb 3.1 oz)   ]

## 2024-10-08 ENCOUNTER — LAB VISIT (OUTPATIENT)
Dept: LAB | Facility: HOSPITAL | Age: 84
End: 2024-10-08
Attending: INTERNAL MEDICINE
Payer: MEDICARE

## 2024-10-08 DIAGNOSIS — I15.2 HYPERTENSION ASSOCIATED WITH DIABETES: ICD-10-CM

## 2024-10-08 DIAGNOSIS — E11.69 HYPERLIPIDEMIA ASSOCIATED WITH TYPE 2 DIABETES MELLITUS: ICD-10-CM

## 2024-10-08 DIAGNOSIS — E11.29 TYPE 2 DIABETES MELLITUS WITH MICROALBUMINURIA, WITHOUT LONG-TERM CURRENT USE OF INSULIN: ICD-10-CM

## 2024-10-08 DIAGNOSIS — E11.22 CKD STAGE 3 SECONDARY TO DIABETES: ICD-10-CM

## 2024-10-08 DIAGNOSIS — E11.59 HYPERTENSION ASSOCIATED WITH DIABETES: ICD-10-CM

## 2024-10-08 DIAGNOSIS — N18.30 CKD STAGE 3 SECONDARY TO DIABETES: ICD-10-CM

## 2024-10-08 DIAGNOSIS — R80.9 TYPE 2 DIABETES MELLITUS WITH MICROALBUMINURIA, WITHOUT LONG-TERM CURRENT USE OF INSULIN: ICD-10-CM

## 2024-10-08 DIAGNOSIS — E78.5 HYPERLIPIDEMIA ASSOCIATED WITH TYPE 2 DIABETES MELLITUS: ICD-10-CM

## 2024-10-08 LAB
ALBUMIN SERPL BCP-MCNC: 3.7 G/DL (ref 3.5–5.2)
ALP SERPL-CCNC: 31 U/L (ref 55–135)
ALT SERPL W/O P-5'-P-CCNC: 20 U/L (ref 10–44)
ANION GAP SERPL CALC-SCNC: 12 MMOL/L (ref 8–16)
AST SERPL-CCNC: 24 U/L (ref 10–40)
BASOPHILS # BLD AUTO: 0.06 K/UL (ref 0–0.2)
BASOPHILS NFR BLD: 0.8 % (ref 0–1.9)
BILIRUB SERPL-MCNC: 0.4 MG/DL (ref 0.1–1)
BUN SERPL-MCNC: 34 MG/DL (ref 8–23)
CALCIUM SERPL-MCNC: 9.5 MG/DL (ref 8.7–10.5)
CHLORIDE SERPL-SCNC: 106 MMOL/L (ref 95–110)
CHOLEST SERPL-MCNC: 138 MG/DL (ref 120–199)
CHOLEST/HDLC SERPL: 3.2 {RATIO} (ref 2–5)
CO2 SERPL-SCNC: 22 MMOL/L (ref 23–29)
CREAT SERPL-MCNC: 1.5 MG/DL (ref 0.5–1.4)
DIFFERENTIAL METHOD BLD: ABNORMAL
EOSINOPHIL # BLD AUTO: 0.2 K/UL (ref 0–0.5)
EOSINOPHIL NFR BLD: 2.2 % (ref 0–8)
ERYTHROCYTE [DISTWIDTH] IN BLOOD BY AUTOMATED COUNT: 12.6 % (ref 11.5–14.5)
EST. GFR  (NO RACE VARIABLE): 34.4 ML/MIN/1.73 M^2
ESTIMATED AVG GLUCOSE: 163 MG/DL (ref 68–131)
GLUCOSE SERPL-MCNC: 151 MG/DL (ref 70–110)
HBA1C MFR BLD: 7.3 % (ref 4–5.6)
HCT VFR BLD AUTO: 34.8 % (ref 37–48.5)
HDLC SERPL-MCNC: 43 MG/DL (ref 40–75)
HDLC SERPL: 31.2 % (ref 20–50)
HGB BLD-MCNC: 11.4 G/DL (ref 12–16)
IMM GRANULOCYTES # BLD AUTO: 0.02 K/UL (ref 0–0.04)
IMM GRANULOCYTES NFR BLD AUTO: 0.3 % (ref 0–0.5)
LDLC SERPL CALC-MCNC: 71.4 MG/DL (ref 63–159)
LYMPHOCYTES # BLD AUTO: 2.1 K/UL (ref 1–4.8)
LYMPHOCYTES NFR BLD: 26.7 % (ref 18–48)
MCH RBC QN AUTO: 28.7 PG (ref 27–31)
MCHC RBC AUTO-ENTMCNC: 32.8 G/DL (ref 32–36)
MCV RBC AUTO: 88 FL (ref 82–98)
MONOCYTES # BLD AUTO: 0.7 K/UL (ref 0.3–1)
MONOCYTES NFR BLD: 8.3 % (ref 4–15)
NEUTROPHILS # BLD AUTO: 4.8 K/UL (ref 1.8–7.7)
NEUTROPHILS NFR BLD: 61.7 % (ref 38–73)
NONHDLC SERPL-MCNC: 95 MG/DL
NRBC BLD-RTO: 0 /100 WBC
PLATELET # BLD AUTO: 222 K/UL (ref 150–450)
PMV BLD AUTO: 10.7 FL (ref 9.2–12.9)
POTASSIUM SERPL-SCNC: 4.6 MMOL/L (ref 3.5–5.1)
PROT SERPL-MCNC: 6.3 G/DL (ref 6–8.4)
RBC # BLD AUTO: 3.97 M/UL (ref 4–5.4)
SODIUM SERPL-SCNC: 140 MMOL/L (ref 136–145)
TRIGL SERPL-MCNC: 118 MG/DL (ref 30–150)
TSH SERPL DL<=0.005 MIU/L-ACNC: 3.56 UIU/ML (ref 0.4–4)
WBC # BLD AUTO: 7.82 K/UL (ref 3.9–12.7)

## 2024-10-08 PROCEDURE — 83036 HEMOGLOBIN GLYCOSYLATED A1C: CPT | Mod: HCNC | Performed by: INTERNAL MEDICINE

## 2024-10-08 PROCEDURE — 85025 COMPLETE CBC W/AUTO DIFF WBC: CPT | Mod: HCNC | Performed by: INTERNAL MEDICINE

## 2024-10-08 PROCEDURE — 84443 ASSAY THYROID STIM HORMONE: CPT | Mod: HCNC | Performed by: INTERNAL MEDICINE

## 2024-10-08 PROCEDURE — 80053 COMPREHEN METABOLIC PANEL: CPT | Mod: HCNC | Performed by: INTERNAL MEDICINE

## 2024-10-08 PROCEDURE — 36415 COLL VENOUS BLD VENIPUNCTURE: CPT | Mod: HCNC,PO | Performed by: INTERNAL MEDICINE

## 2024-10-08 PROCEDURE — 80061 LIPID PANEL: CPT | Mod: HCNC | Performed by: INTERNAL MEDICINE

## 2024-10-09 ENCOUNTER — CLINICAL SUPPORT (OUTPATIENT)
Dept: CARDIOLOGY | Facility: HOSPITAL | Age: 84
End: 2024-10-09
Attending: INTERNAL MEDICINE
Payer: MEDICARE

## 2024-10-09 ENCOUNTER — CLINICAL SUPPORT (OUTPATIENT)
Dept: CARDIOLOGY | Facility: HOSPITAL | Age: 84
End: 2024-10-09
Payer: MEDICARE

## 2024-10-09 DIAGNOSIS — I63.9 CEREBRAL INFARCTION, UNSPECIFIED: ICD-10-CM

## 2024-10-09 PROCEDURE — 93298 REM INTERROG DEV EVAL SCRMS: CPT | Mod: HCNC | Performed by: INTERNAL MEDICINE

## 2024-10-14 ENCOUNTER — OFFICE VISIT (OUTPATIENT)
Dept: INTERNAL MEDICINE | Facility: CLINIC | Age: 84
End: 2024-10-14
Payer: MEDICARE

## 2024-10-14 VITALS
WEIGHT: 166.25 LBS | BODY MASS INDEX: 26.09 KG/M2 | SYSTOLIC BLOOD PRESSURE: 136 MMHG | HEIGHT: 67 IN | TEMPERATURE: 98 F | RESPIRATION RATE: 18 BRPM | DIASTOLIC BLOOD PRESSURE: 52 MMHG | HEART RATE: 68 BPM | OXYGEN SATURATION: 96 %

## 2024-10-14 DIAGNOSIS — I51.89 LEFT VENTRICULAR DIASTOLIC DYSFUNCTION WITH PRESERVED SYSTOLIC FUNCTION: ICD-10-CM

## 2024-10-14 DIAGNOSIS — E11.29 TYPE 2 DIABETES MELLITUS WITH MICROALBUMINURIA, WITHOUT LONG-TERM CURRENT USE OF INSULIN: ICD-10-CM

## 2024-10-14 DIAGNOSIS — N18.30 CKD STAGE 3 SECONDARY TO DIABETES: ICD-10-CM

## 2024-10-14 DIAGNOSIS — E11.59 HYPERTENSION ASSOCIATED WITH DIABETES: ICD-10-CM

## 2024-10-14 DIAGNOSIS — I70.0 AORTIC ATHEROSCLEROSIS: ICD-10-CM

## 2024-10-14 DIAGNOSIS — R47.1 DYSARTHRIA DUE TO ACUTE STROKE: ICD-10-CM

## 2024-10-14 DIAGNOSIS — R80.9 TYPE 2 DIABETES MELLITUS WITH MICROALBUMINURIA, WITHOUT LONG-TERM CURRENT USE OF INSULIN: ICD-10-CM

## 2024-10-14 DIAGNOSIS — I65.23 BILATERAL CAROTID ARTERY STENOSIS: ICD-10-CM

## 2024-10-14 DIAGNOSIS — Z86.73 H/O COMPLETED STROKE: ICD-10-CM

## 2024-10-14 DIAGNOSIS — I63.9 DYSARTHRIA DUE TO ACUTE STROKE: ICD-10-CM

## 2024-10-14 DIAGNOSIS — E11.69 HYPERLIPIDEMIA ASSOCIATED WITH TYPE 2 DIABETES MELLITUS: ICD-10-CM

## 2024-10-14 DIAGNOSIS — E78.5 HYPERLIPIDEMIA ASSOCIATED WITH TYPE 2 DIABETES MELLITUS: ICD-10-CM

## 2024-10-14 DIAGNOSIS — Z00.00 ANNUAL PHYSICAL EXAM: Primary | ICD-10-CM

## 2024-10-14 DIAGNOSIS — I15.2 HYPERTENSION ASSOCIATED WITH DIABETES: ICD-10-CM

## 2024-10-14 DIAGNOSIS — E11.22 CKD STAGE 3 SECONDARY TO DIABETES: ICD-10-CM

## 2024-10-14 PROCEDURE — 99999 PR PBB SHADOW E&M-EST. PATIENT-LVL III: CPT | Mod: PBBFAC,HCNC,, | Performed by: INTERNAL MEDICINE

## 2024-10-16 DIAGNOSIS — E11.59 HYPERTENSION ASSOCIATED WITH DIABETES: ICD-10-CM

## 2024-10-16 DIAGNOSIS — I15.2 HYPERTENSION ASSOCIATED WITH DIABETES: ICD-10-CM

## 2024-10-16 DIAGNOSIS — Z78.0 MENOPAUSE: ICD-10-CM

## 2024-10-17 RX ORDER — FENOFIBRATE 134 MG/1
CAPSULE ORAL
Qty: 90 CAPSULE | Refills: 3 | Status: SHIPPED | OUTPATIENT
Start: 2024-10-17

## 2024-10-17 RX ORDER — LOSARTAN POTASSIUM 100 MG/1
100 TABLET ORAL
Qty: 90 TABLET | Refills: 3 | Status: SHIPPED | OUTPATIENT
Start: 2024-10-17

## 2024-10-18 LAB
OHS CV AF BURDEN PERCENT: < 1
OHS CV DC REMOTE DEVICE TYPE: NORMAL

## 2024-10-28 ENCOUNTER — PATIENT MESSAGE (OUTPATIENT)
Dept: INTERNAL MEDICINE | Facility: CLINIC | Age: 84
End: 2024-10-28
Payer: MEDICARE

## 2024-10-28 RX ORDER — CANAGLIFLOZIN 100 MG/1
100 TABLET, FILM COATED ORAL DAILY
Qty: 90 TABLET | Refills: 0 | Status: SHIPPED | OUTPATIENT
Start: 2024-10-28 | End: 2024-10-29 | Stop reason: SDUPTHER

## 2024-10-31 RX ORDER — CANAGLIFLOZIN 100 MG/1
100 TABLET, FILM COATED ORAL DAILY
Qty: 90 TABLET | Refills: 3 | Status: SHIPPED | OUTPATIENT
Start: 2024-10-31

## 2024-11-01 ENCOUNTER — TELEPHONE (OUTPATIENT)
Dept: INTERNAL MEDICINE | Facility: CLINIC | Age: 84
End: 2024-11-01
Payer: MEDICARE

## 2024-11-13 ENCOUNTER — CLINICAL SUPPORT (OUTPATIENT)
Dept: CARDIOLOGY | Facility: HOSPITAL | Age: 84
End: 2024-11-13
Attending: INTERNAL MEDICINE
Payer: MEDICARE

## 2024-11-13 ENCOUNTER — CLINICAL SUPPORT (OUTPATIENT)
Dept: CARDIOLOGY | Facility: HOSPITAL | Age: 84
End: 2024-11-13
Payer: MEDICARE

## 2024-11-13 DIAGNOSIS — I63.9 CEREBRAL INFARCTION, UNSPECIFIED: ICD-10-CM

## 2024-11-13 PROCEDURE — 93298 REM INTERROG DEV EVAL SCRMS: CPT | Performed by: INTERNAL MEDICINE

## 2024-11-13 PROCEDURE — 93298 REM INTERROG DEV EVAL SCRMS: CPT | Mod: 26,,, | Performed by: INTERNAL MEDICINE

## 2024-11-13 RX ORDER — HYDROCHLOROTHIAZIDE 25 MG/1
25 TABLET ORAL
Qty: 90 TABLET | Refills: 3 | Status: SHIPPED | OUTPATIENT
Start: 2024-11-13

## 2024-11-13 NOTE — TELEPHONE ENCOUNTER
Refill Decision Note   Zaida Liu  is requesting a refill authorization.  Brief Assessment and Rationale for Refill:  Approve     Medication Therapy Plan:        Comments:     Note composed:3:03 PM 11/13/2024

## 2024-11-22 LAB
OHS CV AF BURDEN PERCENT: < 1
OHS CV DC REMOTE DEVICE TYPE: NORMAL

## 2024-12-03 DIAGNOSIS — E11.59 HYPERTENSION ASSOCIATED WITH DIABETES: ICD-10-CM

## 2024-12-03 DIAGNOSIS — I15.2 HYPERTENSION ASSOCIATED WITH DIABETES: ICD-10-CM

## 2024-12-03 RX ORDER — CARVEDILOL 6.25 MG/1
6.25 TABLET ORAL 2 TIMES DAILY WITH MEALS
Qty: 180 TABLET | Refills: 3 | Status: SHIPPED | OUTPATIENT
Start: 2024-12-03

## 2024-12-18 ENCOUNTER — CLINICAL SUPPORT (OUTPATIENT)
Dept: CARDIOLOGY | Facility: HOSPITAL | Age: 84
End: 2024-12-18
Attending: INTERNAL MEDICINE
Payer: MEDICARE

## 2024-12-18 ENCOUNTER — CLINICAL SUPPORT (OUTPATIENT)
Dept: CARDIOLOGY | Facility: HOSPITAL | Age: 84
End: 2024-12-18

## 2024-12-18 DIAGNOSIS — E11.69 HYPERLIPIDEMIA ASSOCIATED WITH TYPE 2 DIABETES MELLITUS: ICD-10-CM

## 2024-12-18 DIAGNOSIS — I63.9 CEREBRAL INFARCTION, UNSPECIFIED: ICD-10-CM

## 2024-12-18 DIAGNOSIS — I65.23 BILATERAL CAROTID ARTERY STENOSIS: ICD-10-CM

## 2024-12-18 DIAGNOSIS — E78.5 HYPERLIPIDEMIA ASSOCIATED WITH TYPE 2 DIABETES MELLITUS: ICD-10-CM

## 2024-12-18 PROCEDURE — 93298 REM INTERROG DEV EVAL SCRMS: CPT | Performed by: INTERNAL MEDICINE

## 2024-12-18 RX ORDER — EZETIMIBE 10 MG/1
10 TABLET ORAL
Qty: 90 TABLET | Refills: 3 | Status: SHIPPED | OUTPATIENT
Start: 2024-12-18

## 2025-01-08 ENCOUNTER — PATIENT MESSAGE (OUTPATIENT)
Dept: INTERNAL MEDICINE | Facility: CLINIC | Age: 85
End: 2025-01-08
Payer: MEDICARE

## 2025-01-08 LAB
OHS CV AF BURDEN PERCENT: < 1
OHS CV DC REMOTE DEVICE TYPE: NORMAL

## 2025-01-08 RX ORDER — CANAGLIFLOZIN 100 MG/1
100 TABLET, FILM COATED ORAL DAILY
Qty: 90 TABLET | Refills: 3 | Status: SHIPPED | OUTPATIENT
Start: 2025-01-08

## 2025-01-22 ENCOUNTER — CLINICAL SUPPORT (OUTPATIENT)
Dept: CARDIOLOGY | Facility: HOSPITAL | Age: 85
End: 2025-01-22
Payer: MEDICARE

## 2025-01-22 ENCOUNTER — CLINICAL SUPPORT (OUTPATIENT)
Dept: CARDIOLOGY | Facility: HOSPITAL | Age: 85
End: 2025-01-22
Attending: INTERNAL MEDICINE
Payer: MEDICARE

## 2025-01-22 DIAGNOSIS — I63.9 CEREBRAL INFARCTION, UNSPECIFIED: ICD-10-CM

## 2025-01-22 PROCEDURE — 93298 REM INTERROG DEV EVAL SCRMS: CPT | Mod: 26,GZ,, | Performed by: INTERNAL MEDICINE

## 2025-01-22 PROCEDURE — 93298 REM INTERROG DEV EVAL SCRMS: CPT | Performed by: INTERNAL MEDICINE

## 2025-01-27 LAB
OHS CV AF BURDEN PERCENT: < 1
OHS CV DC REMOTE DEVICE TYPE: NORMAL

## 2025-01-30 DIAGNOSIS — Z00.00 ENCOUNTER FOR MEDICARE ANNUAL WELLNESS EXAM: ICD-10-CM

## 2025-02-13 RX ORDER — CANAGLIFLOZIN 100 MG/1
100 TABLET, FILM COATED ORAL DAILY
Qty: 90 TABLET | Refills: 0 | Status: SHIPPED | OUTPATIENT
Start: 2025-02-13

## 2025-02-13 NOTE — TELEPHONE ENCOUNTER
LOV: 10/14/24  No Upcoming OV  LF: 2/13/25 - [facility], [place]    - kirk wants med sent to [facility] pharmacy mailorder

## 2025-02-26 ENCOUNTER — CLINICAL SUPPORT (OUTPATIENT)
Dept: CARDIOLOGY | Facility: HOSPITAL | Age: 85
End: 2025-02-26
Payer: MEDICARE

## 2025-02-26 ENCOUNTER — CLINICAL SUPPORT (OUTPATIENT)
Dept: CARDIOLOGY | Facility: HOSPITAL | Age: 85
End: 2025-02-26
Attending: INTERNAL MEDICINE
Payer: MEDICARE

## 2025-02-26 DIAGNOSIS — I63.9 CEREBRAL INFARCTION, UNSPECIFIED: ICD-10-CM

## 2025-02-26 PROCEDURE — 93298 REM INTERROG DEV EVAL SCRMS: CPT | Performed by: INTERNAL MEDICINE

## 2025-02-26 PROCEDURE — 93298 REM INTERROG DEV EVAL SCRMS: CPT | Mod: 26,,, | Performed by: INTERNAL MEDICINE

## 2025-03-10 LAB
OHS CV AF BURDEN PERCENT: < 1
OHS CV DC REMOTE DEVICE TYPE: NORMAL

## 2025-04-02 ENCOUNTER — CLINICAL SUPPORT (OUTPATIENT)
Dept: CARDIOLOGY | Facility: HOSPITAL | Age: 85
End: 2025-04-02
Attending: INTERNAL MEDICINE
Payer: MEDICARE

## 2025-04-02 ENCOUNTER — CLINICAL SUPPORT (OUTPATIENT)
Dept: CARDIOLOGY | Facility: HOSPITAL | Age: 85
End: 2025-04-02
Payer: MEDICARE

## 2025-04-02 DIAGNOSIS — I63.9 CEREBRAL INFARCTION, UNSPECIFIED: ICD-10-CM

## 2025-04-02 PROCEDURE — 93298 REM INTERROG DEV EVAL SCRMS: CPT | Performed by: INTERNAL MEDICINE

## 2025-04-09 RX ORDER — AMLODIPINE BESYLATE 10 MG/1
10 TABLET ORAL
Qty: 90 TABLET | Refills: 3 | Status: SHIPPED | OUTPATIENT
Start: 2025-04-09

## 2025-04-11 LAB
OHS CV AF BURDEN PERCENT: < 1
OHS CV DC REMOTE DEVICE TYPE: NORMAL

## 2025-04-17 ENCOUNTER — PATIENT MESSAGE (OUTPATIENT)
Dept: INTERNAL MEDICINE | Facility: CLINIC | Age: 85
End: 2025-04-17
Payer: MEDICARE

## 2025-04-29 ENCOUNTER — OFFICE VISIT (OUTPATIENT)
Dept: INTERNAL MEDICINE | Facility: CLINIC | Age: 85
End: 2025-04-29
Payer: MEDICARE

## 2025-04-29 ENCOUNTER — LAB VISIT (OUTPATIENT)
Dept: LAB | Facility: HOSPITAL | Age: 85
End: 2025-04-29
Attending: INTERNAL MEDICINE
Payer: MEDICARE

## 2025-04-29 VITALS
RESPIRATION RATE: 15 BRPM | OXYGEN SATURATION: 97 % | HEART RATE: 68 BPM | BODY MASS INDEX: 26.93 KG/M2 | TEMPERATURE: 98 F | WEIGHT: 171.94 LBS | SYSTOLIC BLOOD PRESSURE: 122 MMHG | DIASTOLIC BLOOD PRESSURE: 42 MMHG

## 2025-04-29 DIAGNOSIS — R60.0 BILATERAL LEG EDEMA: ICD-10-CM

## 2025-04-29 DIAGNOSIS — E11.59 HYPERTENSION ASSOCIATED WITH DIABETES: ICD-10-CM

## 2025-04-29 DIAGNOSIS — I51.89 LEFT VENTRICULAR DIASTOLIC DYSFUNCTION WITH PRESERVED SYSTOLIC FUNCTION: Primary | ICD-10-CM

## 2025-04-29 DIAGNOSIS — R42 DIZZINESS: ICD-10-CM

## 2025-04-29 DIAGNOSIS — I15.2 HYPERTENSION ASSOCIATED WITH DIABETES: ICD-10-CM

## 2025-04-29 DIAGNOSIS — I51.89 LEFT VENTRICULAR DIASTOLIC DYSFUNCTION WITH PRESERVED SYSTOLIC FUNCTION: ICD-10-CM

## 2025-04-29 LAB
ALBUMIN SERPL BCP-MCNC: 4.1 G/DL (ref 3.5–5.2)
ALP SERPL-CCNC: 37 UNIT/L (ref 40–150)
ALT SERPL W/O P-5'-P-CCNC: 18 UNIT/L (ref 10–44)
ANION GAP (OHS): 11 MMOL/L (ref 8–16)
AST SERPL-CCNC: 19 UNIT/L (ref 11–45)
BILIRUB SERPL-MCNC: 0.3 MG/DL (ref 0.1–1)
BNP SERPL-MCNC: 95 PG/ML (ref 0–99)
BUN SERPL-MCNC: 43 MG/DL (ref 8–23)
CALCIUM SERPL-MCNC: 9.6 MG/DL (ref 8.7–10.5)
CHLORIDE SERPL-SCNC: 105 MMOL/L (ref 95–110)
CO2 SERPL-SCNC: 24 MMOL/L (ref 23–29)
CREAT SERPL-MCNC: 1.6 MG/DL (ref 0.5–1.4)
EAG (OHS): 157 MG/DL (ref 68–131)
GFR SERPLBLD CREATININE-BSD FMLA CKD-EPI: 32 ML/MIN/1.73/M2
GLUCOSE SERPL-MCNC: 109 MG/DL (ref 70–110)
HBA1C MFR BLD: 7.1 % (ref 4–5.6)
POTASSIUM SERPL-SCNC: 4.6 MMOL/L (ref 3.5–5.1)
PROT SERPL-MCNC: 7.2 GM/DL (ref 6–8.4)
SODIUM SERPL-SCNC: 140 MMOL/L (ref 136–145)

## 2025-04-29 PROCEDURE — 1125F AMNT PAIN NOTED PAIN PRSNT: CPT | Mod: CPTII,HCNC,GC,S$GLB

## 2025-04-29 PROCEDURE — 36415 COLL VENOUS BLD VENIPUNCTURE: CPT | Mod: HCNC,PO

## 2025-04-29 PROCEDURE — 83880 ASSAY OF NATRIURETIC PEPTIDE: CPT | Mod: HCNC

## 2025-04-29 PROCEDURE — 83036 HEMOGLOBIN GLYCOSYLATED A1C: CPT | Mod: HCNC

## 2025-04-29 PROCEDURE — 80053 COMPREHEN METABOLIC PANEL: CPT | Mod: HCNC

## 2025-04-29 PROCEDURE — 1159F MED LIST DOCD IN RCRD: CPT | Mod: CPTII,HCNC,GC,S$GLB

## 2025-04-29 PROCEDURE — 3078F DIAST BP <80 MM HG: CPT | Mod: CPTII,HCNC,GC,S$GLB

## 2025-04-29 PROCEDURE — 99999 PR PBB SHADOW E&M-EST. PATIENT-LVL IV: CPT | Mod: PBBFAC,HCNC,GC,

## 2025-04-29 PROCEDURE — 99214 OFFICE O/P EST MOD 30 MIN: CPT | Mod: HCNC,GC,S$GLB,

## 2025-04-29 PROCEDURE — 3074F SYST BP LT 130 MM HG: CPT | Mod: CPTII,HCNC,GC,S$GLB

## 2025-04-29 NOTE — PROGRESS NOTES
INTERNAL MEDICINE RESIDENT CLINIC  CLINIC NOTE    Patient Name: Zaida Liu  YOB: 1940    PRESENTING HISTORY       History of Present Illness:  Ms. Zaida Liu is a 85 y.o. female with T2DM, HTN, obesity, dyslipidemia, CVA w/ mild cognitive deficits, s/p AVR and CKD Stage 3 who presents to clinic for bilateral leg edema.     She complains of worsening bilateral leg edema and dizziness, especially when getting up from watching TV. Denies recent LOC.     Her previous echo in 2022 showed EF of 65% with mild left atrial enlargement, grade I left ventricular diastolic dysfunction and normal RV systolic function. DEYVI of 36.9 mL/m2.     Pt is on Amlodipine 10 mg daily, Coreg 6.25 mg BID, Losartan 100 mg daily. Noted to have issues with leg edema in the past that improved with elevation.         Review of Systems   Constitutional:  Negative for chills and fever.   Cardiovascular:  Positive for leg swelling. Negative for chest pain and orthopnea.   Gastrointestinal:  Negative for abdominal pain.   Neurological:  Positive for dizziness.         PAST HISTORY:     Past Medical History:   Diagnosis Date    Aortic stenosis     moderate to severe    Cryptogenic stroke 7/30/2023    Disorder of kidney and ureter     DVT (deep venous thrombosis)     postoperatively    Hemorrhoids     History of DVT of lower extremity     s/p surgery    Obesity, diabetes, and hypertension syndrome     Positive PPD, treated     PPD positive, treated     hosp x 1 yr, treated x 5 yrs- child    Sciatica     Stenosis of aortic and mitral valves     Thyroid disease     Type 2 diabetes mellitus with circulatory disorder 10/14/2016    Urinary incontinence        Past Surgical History:   Procedure Laterality Date    AORTIC VALVE REPLACEMENT  8/25/2015    Aortic valve replacement with a 21-mm St. Jameel pericardial valve tissue via upper dominguez-sternotomy.    BREAST SURGERY      breast reduction    CHOLECYSTECTOMY      COLONOSCOPY N/A  2020    Procedure: COLONOSCOPY;  Surgeon: Maik Arango MD;  Location: [facility] ENDO;  Service: Endoscopy;  Laterality: N/A;    INSERTION OF IMPLANTABLE LOOP RECORDER Left 2023    Procedure: Insertion, Implantable Loop Recorder;  Surgeon: Harsh Araujo MD;  Location: [facility] EP LAB;  Service: Cardiology;  Laterality: Left;  CVA, ILR, BSCI, Local, IL, 3 Prep    TONSILLECTOMY      TOTAL REDUCTION MAMMOPLASTY      TUBAL LIGATION         Family History   Problem Relation Name Age of Onset    Heart disease Mother          CHF    Heart failure Mother          d. 85    Hypertension Mother      Heart disease Father          d. 61    Alcohol abuse Father      Stroke Father      Hypertension Father      Lymphoma Sister Saniya         on immunosuppresant    Rheum arthritis Sister Saniya         d.80    Cancer Sister Saniya         lymphoma    Heart disease Sister Afshan     Stroke Sister Afshan         mild    Other Sister Afshan         frequent urination    Colon cancer Sister Le         d. 81    Heart disease Brother Kash         d. 82    Heart disease Brother Rodrick         d. 60    Diabetes Brother Rodrick     No Known Problems Daughter Echo         lives in [facility], 2nd m pending    No Known Problems Son Alphonso         lives in [place], 15 yo son, 22 yo dtr- 2019    Hypertension Son Deandre         lives in [facility], 20yo son ()    Breast cancer Cousin      Cancer Cousin          breast    Breast cancer Other neice     Cancer Other neice         breast    Colon cancer Other neice     Heart attack Neg Hx      Hyperlipidemia Neg Hx         Social History     Socioeconomic History    Marital status:    Tobacco Use    Smoking status: Former     Current packs/day: 0.00     Types: Cigarettes     Quit date: 10/14/1966     Years since quittin.5    Smokeless tobacco: Never   Substance and Sexual Activity    Alcohol use: Yes     Alcohol/week: 2.0 standard drinks of  alcohol     Types: 2 Glasses of wine per week     Comment: 1-2 glasses on wine on weekend    Drug use: No    Sexual activity: Not Currently     Partners: Male   Social History Narrative          Spouse retired, first spouse  when he was 38.          3 children,  2 sons, and daughter,      son with hypertension.  No heart disease as of yet.      Her spouse has hypertension, AFib and memory      issues, but apparently since he has retired they are doing better.           FHX:    Bro 82, d. massive MI, + DM,s/p amputation LE limb    Sis , new dx cerebrovascular ds, s/p stent x2 in cerebral carotid               Social Drivers of Health     Financial Resource Strain: Low Risk  (2024)    Overall Financial Resource Strain (CARDIA)     Difficulty of Paying Living Expenses: Not hard at all   Food Insecurity: No Food Insecurity (2024)    Hunger Vital Sign     Worried About Running Out of Food in the Last Year: Never true     Ran Out of Food in the Last Year: Never true   Transportation Needs: No Transportation Needs (2021)    PRAPARE - Transportation     Lack of Transportation (Medical): No     Lack of Transportation (Non-Medical): No   Physical Activity: Unknown (2024)    Exercise Vital Sign     Days of Exercise per Week: 0 days   Stress: No Stress Concern Present (2024)    [employer] [place] [employer] - Occupational Stress Questionnaire     Feeling of Stress : Not at all   Housing Stability: Low Risk  (2021)    Housing Stability Vital Sign     Unable to Pay for Housing in the Last Year: No     Number of Places Lived in the Last Year: 1     Unstable Housing in the Last Year: No       MEDICATIONS & ALLERGIES:     Current Outpatient Medications on File Prior to Visit   Medication Sig    amLODIPine (NORVASC) 10 MG tablet TAKE 1 TABLET EVERY DAY    aspirin (ECOTRIN) 81 MG EC tablet Take 81 mg by mouth once daily.    B-complex with vitamin C (VITAMIN B COMPLEX-C ORAL) Take 1 tablet by  mouth once daily. Vitamin b3    canagliflozin (INVOKANA) 100 mg Tab tablet Take 1 tablet (100 mg total) by mouth once daily.    carvediloL (COREG) 6.25 MG tablet Take 1 tablet (6.25 mg total) by mouth 2 (two) times daily with meals.    ezetimibe (ZETIA) 10 mg tablet TAKE 1 TABLET ONE TIME DAILY    fenofibrate micronized (LOFIBRA) 134 MG Cap TAKE 1 CAPSULE DAILY WITH BREAKFAST.    hydroCHLOROthiazide (HYDRODIURIL) 25 MG tablet TAKE 1 TABLET EVERY DAY    losartan (COZAAR) 100 MG tablet TAKE 1 TABLET EVERY DAY    metFORMIN (GLUCOPHAGE) 500 MG tablet TAKE 1 TABLET TWICE DAILY WITH MEALS    omeprazole (PRILOSEC) 20 MG capsule Take 20 mg by mouth every morning.    rosuvastatin (CRESTOR) 40 MG Tab TAKE 1 TABLET EVERY EVENING    vitamin D 1000 units Tab Take 1,000 Units by mouth once daily.    diclofenac sodium (VOLTAREN) 1 % Gel Apply 2 g topically once daily. (Patient not taking: Reported on 4/29/2025)     No current facility-administered medications on file prior to visit.       Review of patient's allergies indicates:   Allergen Reactions    Codeine Other (See Comments)       OBJECTIVE:   Vital Signs:  Vitals:    04/29/25 1336 04/29/25 1400 04/29/25 1401   BP: (!) 100/50 (!) 124/42 (!) 122/42   Pulse: 68     Resp: 15     Temp: 97.8 °F (36.6 °C)     TempSrc: Temporal     SpO2: 97%     Weight: 78 kg (171 lb 15.3 oz)         No results found for this or any previous visit (from the past 24 hours).      Physical Exam  Vitals and nursing note reviewed.   Cardiovascular:      Rate and Rhythm: Normal rate and regular rhythm.      Pulses: Normal pulses.   Pulmonary:      Effort: Pulmonary effort is normal.      Breath sounds: Normal breath sounds.   Musculoskeletal:      Right lower leg: Edema (1+) present.      Left lower leg: Edema (1+) present.   Neurological:      Mental Status: She is alert.           ASSESSMENT & PLAN:     1. Left ventricular diastolic dysfunction with preserved systolic function  -     Ambulatory  referral/consult to Cardiology; Future; Expected date: 05/06/2025  -     BNP; Future; Expected date: 04/29/2025    2. Hypertension associated with diabetes  Continue Coreg, Losartan, HCTZ. Discontinued amlodipine.   -     Microalbumin/creatinine urine ratio  -     Hemoglobin A1C; Future; Expected date: 04/29/2025  -     Comprehensive Metabolic Panel; Future; Expected date: 04/29/2025    3. Bilateral leg edema  Pt with chronic bilateral leg edema and right ankle pain. Believe that amlodipine 10 mg may be contributing. With her soft pressures and dizziness, will discontinue amlodipine 10 mg    4. Dizziness  With her soft pressures and dizziness, will discontinue amlodipine 10 mg      Discussed with Dr. Cabrales    33 >  minutes were spent reviewing results of prior testing, obtaining and/or reviewing separately obtained history, performing a medically appropriate examination and interview, counseling and educating the patient/family, ordering medications/tests/procedures, referring and communicating with other health care professionals, documenting clinical information in the electronic health record, and independently interpreting results.         Dr. Robin Preston, DO  Internal Medicine PGY-1

## 2025-04-30 NOTE — PROGRESS NOTES
I have interviewed and examined the patient w/ the resident,   I agree w/ the impression and plan as outlined above.      Karina Cabrales MD- [facility]    DIL +

## 2025-05-01 ENCOUNTER — RESULTS FOLLOW-UP (OUTPATIENT)
Dept: INTERNAL MEDICINE | Facility: CLINIC | Age: 85
End: 2025-05-01

## 2025-05-01 DIAGNOSIS — I10 HYPERTENSION, UNSPECIFIED TYPE: Primary | ICD-10-CM

## 2025-05-07 ENCOUNTER — CLINICAL SUPPORT (OUTPATIENT)
Dept: CARDIOLOGY | Facility: HOSPITAL | Age: 85
End: 2025-05-07
Attending: INTERNAL MEDICINE
Payer: MEDICARE

## 2025-05-07 ENCOUNTER — CLINICAL SUPPORT (OUTPATIENT)
Dept: CARDIOLOGY | Facility: HOSPITAL | Age: 85
End: 2025-05-07
Payer: MEDICARE

## 2025-05-07 DIAGNOSIS — I63.9 CEREBRAL INFARCTION, UNSPECIFIED: ICD-10-CM

## 2025-05-07 PROCEDURE — 93298 REM INTERROG DEV EVAL SCRMS: CPT | Mod: HCNC | Performed by: INTERNAL MEDICINE

## 2025-05-08 ENCOUNTER — TELEPHONE (OUTPATIENT)
Dept: INTERNAL MEDICINE | Facility: CLINIC | Age: 85
End: 2025-05-08
Payer: MEDICARE

## 2025-05-08 LAB
OHS CV AF BURDEN PERCENT: < 1
OHS CV DC REMOTE DEVICE TYPE: NORMAL

## 2025-05-09 DIAGNOSIS — E11.65 TYPE 2 DIABETES MELLITUS WITH HYPERGLYCEMIA, UNSPECIFIED WHETHER LONG TERM INSULIN USE: Primary | ICD-10-CM

## 2025-05-09 RX ORDER — CANAGLIFLOZIN 100 MG/1
100 TABLET, FILM COATED ORAL
Qty: 90 TABLET | Refills: 3 | Status: SHIPPED | OUTPATIENT
Start: 2025-05-09

## 2025-05-09 NOTE — TELEPHONE ENCOUNTER
Refill Routing Note   Medication(s) are not appropriate for processing by [facility] for the following reason(s):        Non-participating provider    [facility] action(s):  Route             Appointments  past 12m or future 3m with PCP    Date Provider   Last Visit   10/14/2024 Karina Wilson MD   Next Visit   Visit date not found Karina Wilson MD   ED visits in past 90 days: 0        Note composed:10:44 AM 05/09/2025

## 2025-05-15 ENCOUNTER — OFFICE VISIT (OUTPATIENT)
Dept: CARDIOLOGY | Facility: CLINIC | Age: 85
End: 2025-05-15
Payer: MEDICARE

## 2025-05-15 VITALS
WEIGHT: 172.06 LBS | DIASTOLIC BLOOD PRESSURE: 59 MMHG | SYSTOLIC BLOOD PRESSURE: 183 MMHG | HEIGHT: 67 IN | RESPIRATION RATE: 15 BRPM | BODY MASS INDEX: 27.01 KG/M2 | OXYGEN SATURATION: 99 % | HEART RATE: 96 BPM

## 2025-05-15 DIAGNOSIS — Z95.2 S/P AVR (AORTIC VALVE REPLACEMENT): Primary | ICD-10-CM

## 2025-05-15 DIAGNOSIS — I70.0 AORTIC ATHEROSCLEROSIS: ICD-10-CM

## 2025-05-15 DIAGNOSIS — I63.9 CEREBRAL INFARCTION, UNSPECIFIED MECHANISM: ICD-10-CM

## 2025-05-15 DIAGNOSIS — E78.5 HYPERLIPIDEMIA ASSOCIATED WITH TYPE 2 DIABETES MELLITUS: ICD-10-CM

## 2025-05-15 DIAGNOSIS — E11.69 HYPERLIPIDEMIA ASSOCIATED WITH TYPE 2 DIABETES MELLITUS: ICD-10-CM

## 2025-05-15 LAB
OHS QRS DURATION: 86 MS
OHS QTC CALCULATION: 428 MS

## 2025-05-15 PROCEDURE — 99999 PR PBB SHADOW E&M-EST. PATIENT-LVL IV: CPT | Mod: PBBFAC,HCNC,, | Performed by: INTERNAL MEDICINE

## 2025-05-15 RX ORDER — FUROSEMIDE 20 MG/1
20 TABLET ORAL
Qty: 90 TABLET | Refills: 3 | Status: SHIPPED | OUTPATIENT
Start: 2025-05-15

## 2025-05-15 RX ORDER — AMLODIPINE BESYLATE 5 MG/1
5 TABLET ORAL DAILY
Qty: 90 TABLET | Refills: 3 | Status: SHIPPED | OUTPATIENT
Start: 2025-05-15 | End: 2025-05-15

## 2025-05-15 RX ORDER — AMLODIPINE BESYLATE 5 MG/1
5 TABLET ORAL DAILY
Qty: 90 TABLET | Refills: 3 | Status: SHIPPED | OUTPATIENT
Start: 2025-05-15

## 2025-05-15 NOTE — PROGRESS NOTES
CARDIOVASCULAR CONSULTATION    REASON FOR CONSULT:   Zaida Liu is a 85 y.o. female who presents for   Chief Complaint   Patient presents with    Consult        Referred by:  No referring provider defined for this encounter.      HISTORY OF PRESENT ILLNESS:     History of Present Illness    CHIEF COMPLAINT:  Zaida presents today with concerns about BP.    BLOOD PRESSURE AND DIZZINESS:  She reports recent BP with systolic of 183 and diastolic of 42. She experiences orthostatic dizziness, particularly when rising after watching television, requiring her to stand up slowly to manage symptoms. She was previously taken off amlodipine due to experiencing dizziness.    CARDIOVASCULAR:  She has a history of AVR. Last echo was in 2022.     CURRENT MEDICATIONS:  She is currently taking HCTZ, rosuvastatin, fenofibrate, and ezetimibe.         Results for orders placed or performed in visit on 05/14/25   EKG 12-lead    Collection Time: 05/14/25  3:04 PM   Result Value Ref Range    QRS Duration 86 ms    [facility]HS QTC Calculation 428 ms    Narrative    Test Reason :    Vent. Rate :  69 BPM     Atrial Rate :  69 BPM     P-R Int : 202 ms          QRS Dur :  86 ms      QT Int : 400 ms       P-R-T Axes :  71  70  94 degrees    QTcB Int : 428 ms    Normal sinus rhythm  Nonspecific T wave abnormality  Abnormal ECG  When compared with ECG of 03-Nov-2023 09:44,  Nonspecific T wave abnormality has replaced inverted T waves in Lateral  leads  Confirmed by Parag Gomes ([id number]) on 5/15/2025 2:28:39 PM    Referred By: JANNET BENITEZ           Confirmed By: Parag Gomes          ECHO    Results for orders placed during the hospital encounter of 12/29/22    Echo    Interpretation Summary  · Mild left atrial enlargement.  · The left ventricle is normal in size with normal systolic function.  · The estimated ejection fraction is 65%.  · Grade I left ventricular diastolic dysfunction.  · Normal right ventricular size with normal right  ventricular systolic function.  · There is a 21 mm St. Jameel pericardial bioprosthetic aortic valve present. There is no aortic insufficiency present. Prosthetic aortic valve is normal.  · The aortic valve mean gradient is 10 mmHg with a dimensionless index of 0.54.  · Normal central venous pressure (3 mmHg).  · The estimated PA systolic pressure is 29 mmHg.  · There is no pulmonary hypertension.        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 Jameel pericardial valve tissue via upper dominguez-sternotomy.    BREAST SURGERY      breast reduction    CHOLECYSTECTOMY      COLONOSCOPY N/A 9/21/2020    Procedure: COLONOSCOPY;  Surgeon: Maik Arango MD;  Location: [facility] ENDO;  Service: Endoscopy;  Laterality: N/A;    INSERTION OF IMPLANTABLE LOOP RECORDER Left 7/31/2023    Procedure: Insertion, Implantable Loop Recorder;  Surgeon: Harsh Araujo MD;  Location: [facility] EP LAB;  Service: Cardiology;  Laterality: Left;  CVA, ILR, BSCI, Local, MO, 3 Prep    TONSILLECTOMY      TOTAL REDUCTION MAMMOPLASTY      TUBAL LIGATION             SOCIAL HISTORY:   Social History[1]    FAMILY HISTORY:     Family History   Problem Relation Name Age of Onset    Heart disease Mother          CHF    Heart failure Mother          d. 85     "Hypertension Mother      Heart disease Father          d. 61    Alcohol abuse Father      Stroke Father      Hypertension Father      Lymphoma Sister Saniya         on immunosuppresant    Rheum arthritis Sister Saniya         d.80    Cancer Sister Saniya         lymphoma    Heart disease Sister Afshan     Stroke Sister Afshan         mild    Other Sister Afshan         frequent urination    Colon cancer Sister Le         d. 81    Heart disease Brother Kash         d. 82    Heart disease Brother Rodrick         d. 60    Diabetes Brother Rodrick     No Known Problems Daughter Echo         lives in [facility], 2nd m pending    No Known Problems Son Alphonso         lives in [place], 15 yo son, 22 yo dtr- 2019    Hypertension Son Deandre         lives in [facility], 18yo son (2023)    Breast cancer Cousin      Cancer Cousin          breast    Breast cancer Other neice     Cancer Other neice         breast    Colon cancer Other neice     Heart attack Neg Hx      Hyperlipidemia Neg Hx         REVIEW OF SYSTEMS:   Review of Systems   Constitutional: Negative.   HENT: Negative.     Eyes: Negative.    Cardiovascular: Negative.    Respiratory: Negative.     Endocrine: Negative.    Hematologic/Lymphatic: Negative.    Skin: Negative.    Musculoskeletal: Negative.    Gastrointestinal: Negative.    Genitourinary: Negative.    Neurological: Negative.    Psychiatric/Behavioral: Negative.     Allergic/Immunologic: Negative.        A 10 point review of systems was performed and all the pertinent positives have been mentioned. Rest of review of systems was negative.        PHYSICAL EXAM:     Vitals:    05/15/25 1406   BP: (!) 183/59   Pulse: 96   Resp: 15    Body mass index is 26.95 kg/m².  Weight: 78 kg (172 lb 1.1 oz)   Height: 5' 7" (170.2 cm)     Physical Exam  Constitutional:       Appearance: Normal appearance. She is well-developed.   HENT:      Head: Normocephalic.   Eyes:      Pupils: Pupils are " equal, round, and reactive to light.   Cardiovascular:      Rate and Rhythm: Normal rate and regular rhythm.      Heart sounds: Murmur heard.   Pulmonary:      Effort: Pulmonary effort is normal.      Breath sounds: Normal breath sounds.   Abdominal:      General: Bowel sounds are normal.      Palpations: Abdomen is soft.      Tenderness: There is no abdominal tenderness.   Musculoskeletal:         General: Normal range of motion.      Cervical back: Normal range of motion and neck supple.   Skin:     General: Skin is warm.   Neurological:      Mental Status: She is alert and oriented to person, place, and time.         Physical Exam              DATA:     Laboratory:  CBC:  Recent Labs   Lab 03/01/23  0949 08/15/24  1216 10/08/24  1104   WBC 7.58 7.86 7.82   Hemoglobin 11.2 L 11.7 L 11.4 L   Hematocrit 34.7 L 34.7 L 34.8 L   Platelets 246 227 222       CHEMISTRIES:  Recent Labs   Lab 06/13/22  1041 09/13/22  0847 08/15/24  1216 10/08/24  1104 04/29/25  1447   Glucose 112 H   < > 164 H 151 H 109   Sodium 140   < > 139 140 140   Potassium 4.4   < > 4.2 4.6 4.6   BUN 18   < > 30 H 34 H 43 H   Creatinine 1.0   < > 1.3 1.5 H 1.6 H   eGFR if  >60.0  --   --   --   --    eGFR if non  53.0 A  --   --   --   --    Calcium 9.5   < > 9.9 9.5 9.6    < > = values in this interval not displayed.       CARDIAC BIOMARKERS:  Recent Labs   Lab 05/19/23  1535    H       COAGS:  Recent Labs   Lab 05/19/23  1606 05/20/23  0331   INR 1.1 1.1       LIPIDS/LFTS:  Recent Labs   Lab 06/12/23  1032 09/06/23  1019 04/03/24  1105 08/15/24  1216 10/08/24  1104 04/29/25  1447   Cholesterol 160 147  --   --  138  --    Triglycerides 156 H 106  --   --  118  --    HDL 51 45  --   --  43  --    LDL Cholesterol 77.8 80.8  --   --  71.4  --    Non-HDL Cholesterol 109 102  --   --  95  --    AST 21 18   < > 18 24 19   ALT 18 16   < > 15 20 18    < > = values in this interval not displayed.       Hemoglobin A1C    Date Value Ref Range Status   10/08/2024 7.3 (H) 4.0 - 5.6 % Final     Comment:     ADA Screening Guidelines:  5.7-6.4%  Consistent with prediabetes  >or=6.5%  Consistent with diabetes    High levels of fetal hemoglobin interfere with the HbA1C  assay. Heterozygous hemoglobin variants (HbS, HgC, etc)do  not significantly interfere with this assay.   However, presence of multiple variants may affect accuracy.     08/15/2024 7.3 (H) 4.0 - 5.6 % Final     Comment:     ADA Screening Guidelines:  5.7-6.4%  Consistent with prediabetes  >or=6.5%  Consistent with diabetes    High levels of fetal hemoglobin interfere with the HbA1C  assay. Heterozygous hemoglobin variants (HbS, HgC, etc)do  not significantly interfere with this assay.   However, presence of multiple variants may affect accuracy.     04/03/2024 7.7 (H) 4.0 - 5.6 % Final     Comment:     ADA Screening Guidelines:  5.7-6.4%  Consistent with prediabetes  >or=6.5%  Consistent with diabetes    High levels of fetal hemoglobin interfere with the HbA1C  assay. Heterozygous hemoglobin variants (HbS, HgC, etc)do  not significantly interfere with this assay.   However, presence of multiple variants may affect accuracy.       Hemoglobin A1c   Date Value Ref Range Status   04/29/2025 7.1 (H) 4.0 - 5.6 % Final     Comment:     ADA Screening Guidelines:  5.7-6.4%  Consistent with prediabetes  >=6.5%  Consistent with diabetes    High levels of fetal hemoglobin interfere with the HbA1C  assay. Heterozygous hemoglobin variants (HbS, HgC, etc)do  not significantly interfere with this assay.   However, presence of multiple variants may affect accuracy.       TSH  Recent Labs   Lab 06/12/23  1032 08/15/24  1216 10/08/24  1104   TSH 1.462 2.318 3.559       The ASCVD Risk score (Jack DK, et al., 2019) failed to calculate for the following reasons:    The 2019 ASCVD risk score is only valid for ages 40 to 79    Risk score cannot be calculated because patient has a medical  history suggesting prior/existing ASCVD       BNP    Lab Results   Component Value Date/Time    BNP 95 04/29/2025 02:47 PM    BNP 28 12/15/2015 05:01 AM    BNP 64 11/11/2015 03:25 PM         ASSESSMENT AND PLAN     Problem List[2]        ALLERGIES AND MEDICATION:     Review of patient's allergies indicates:   Allergen Reactions    Codeine Other (See Comments)        Medication List            Accurate as of May 15, 2025  3:03 PM. If you have any questions, ask your nurse or doctor.                START taking these medications      amLODIPine 5 MG tablet  Commonly known as: NORVASC  Take 1 tablet (5 mg total) by mouth once daily.  Started by: Rashida Kumar MD     furosemide 20 MG tablet  Commonly known as: LASIX  Take 1 tablet (20 mg total) by mouth as needed (Take 1 pill as needed for edema).  Started by: Rashida Kumar MD            CONTINUE taking these medications      aspirin 81 MG EC tablet  Commonly known as: ECOTRIN     carvediloL 6.25 MG tablet  Commonly known as: COREG  Take 1 tablet (6.25 mg total) by mouth 2 (two) times daily with meals.     diclofenac sodium 1 % Gel  Commonly known as: VOLTAREN     ezetimibe 10 mg tablet  Commonly known as: ZETIA  TAKE 1 TABLET ONE TIME DAILY     INVOKANA 100 mg Tab tablet  Generic drug: canagliflozin  TAKE 1 TABLET ONE TIME DAILY     losartan 100 MG tablet  Commonly known as: COZAAR  TAKE 1 TABLET EVERY DAY     metFORMIN 500 MG tablet  Commonly known as: GLUCOPHAGE  TAKE 1 TABLET TWICE DAILY WITH MEALS     omeprazole 20 MG capsule  Commonly known as: PRILOSEC     rosuvastatin 40 MG Tab  Commonly known as: CRESTOR  TAKE 1 TABLET EVERY EVENING     VITAMIN B COMPLEX-C ORAL     vitamin D 1000 units Tab  Commonly known as: VITAMIN D3            STOP taking these medications      fenofibrate micronized 134 MG Cap  Commonly known as: LOFIBRA  Stopped by: Rashida Kumar MD     hydroCHLOROthiazide 25 MG tablet  Commonly known as: HYDRODIURIL  Stopped by: Rashida Kumar MD                Where to Get Your Medications        These medications were sent to [id number] - [place], [place] - [address]  [address] [address]      Phone: [phone]   amLODIPine 5 MG tablet  furosemide 20 MG tablet         Orders Placed This Encounter   Procedures    Echo       Assessment & Plan    IMPRESSION:  Discontinued HCTZ to address orthostatic dizziness.  Reduced medication burden for cholesterol management.  Annual echocardiogram necessary due to history of aortic valve replacement.    PLAN SUMMARY:  - Ordered echocardiogram  - Started amlodipine 5 mg daily, increase to 10 mg if needed  - Continued rosuvastatin and ezetimibe at current doses  - Discontinued fenofibrate  - Started Lasix as needed for swelling  - Use compression stockings for dizziness  - Follow up to assess medication changes and blood pressure control  - Follow up after echocardiogram completion    HYPERTENSION:  - Explained importance of monitoring systolic blood pressure, aiming for below 130.  - Started amlodipine 5 mg daily. Increase to 10 mg daily if blood pressure remains above 140 systolic after 5 days.  - Ordered echocardiogram.    DIZZINESS:  - Use compression stockings when feeling dizzy.  - Anise to increase daily water intake to 60 oz, monitor blood pressure at home for 5 days.  -encourage fluid intake.  Discontinue hydrochlorothiazide    EDEMA:  - Started Lasix as needed for swelling.    HYPERLIPIDEMIA:  - Continued rosuvastatin and ezetimibe at current doses.  - Discontinued fenofibrate.    History of aortic valve replacement:  Follow-up echocardiogram    History of CVA.  Has a implantable loop recorder.  Followed by EP    FOLLOW-UP:  - Follow up after echocardiogram is completed.  - Follow up to assess medication changes and blood pressure control.           Thank you very much for involving me in the care of your patient.  Please do not hesitate to contact me if there are any  questions.      Rashida Kumar MD, [facility], [facility]  Interventional Cardiologist, [facility].       Visit today included increased complexity associated with the care of the episodic problem aortic valve replacement, dyslipidemia, stroke, hypertension, aortic valve replaced addressed and managing the longitudinal care of the patient due to the serious and/or complex managed problem(s) Problem List[3]  .    This note was dictated with the help of speech recognition software.  There might be un-intended errors and/or substitutions.    This note was generated with the assistance of ambient listening technology. Verbal consent was obtained by the patient and accompanying visitor(s) for the recording of patient appointment to facilitate this note. I attest to having reviewed and edited the generated note for accuracy, though some syntax or spelling errors may persist. Please contact the author of this note for any clarification.                    [1]   Social History  Socioeconomic History    Marital status:    Tobacco Use    Smoking status: Former     Current packs/day: 0.00     Types: Cigarettes     Quit date: 10/14/1966     Years since quittin.6    Smokeless tobacco: Never   Substance and Sexual Activity    Alcohol use: Yes     Alcohol/week: 2.0 standard drinks of alcohol     Types: 2 Glasses of wine per week     Comment: 1-2 glasses on wine on weekend    Drug use: No    Sexual activity: Not Currently     Partners: Male   Social History Narrative          Spouse retired, first spouse  when he was 38.          3 children,  2 sons, and daughter,      son with hypertension.  No heart disease as of yet.      Her spouse has hypertension, AFib and memory      issues, but apparently since he has retired they are doing better.           FHX:    Bro 82, d. massive MI, + DM,s/p amputation LE limb    Sis , new dx cerebrovascular ds, s/p stent x2 in cerebral carotid               Social Drivers of Health      Financial Resource Strain: Low Risk  (8/19/2024)    Overall Financial Resource Strain (CARDIA)     Difficulty of Paying Living Expenses: Not hard at all   Food Insecurity: No Food Insecurity (8/19/2024)    Hunger Vital Sign     Worried About Running Out of Food in the Last Year: Never true     Ran Out of Food in the Last Year: Never true   Transportation Needs: No Transportation Needs (8/5/2021)    PRAPARE - Transportation     Lack of Transportation (Medical): No     Lack of Transportation (Non-Medical): No   Physical Activity: Unknown (8/19/2024)    Exercise Vital Sign     Days of Exercise per Week: 0 days   Stress: No Stress Concern Present (8/19/2024)    [employer] [place] [employer] - Occupational Stress Questionnaire     Feeling of Stress : Not at all   Housing Stability: Low Risk  (8/5/2021)    Housing Stability Vital Sign     Unable to Pay for Housing in the Last Year: No     Number of Places Lived in the Last Year: 1     Unstable Housing in the Last Year: No   [2]   Patient Active Problem List  Diagnosis    Hyperlipidemia associated with type 2 diabetes mellitus    Nontoxic multinodular goiter    Aortic stenosis    S/P AVR (aortic valve replacement)    Hypertension associated with diabetes    Left ventricular diastolic dysfunction with preserved systolic function    Aortic atherosclerosis    Osteopenia    Gastroesophageal reflux disease without esophagitis    Bilateral carotid artery stenosis    CKD stage 3 secondary to diabetes    Type 2 diabetes mellitus with microalbuminuria, without long-term current use of insulin    Obesity, diabetes, and hypertension syndrome    Overweight (BMI 25.0-29.9)    DM type 2 without retinopathy    Type 2 diabetes mellitus with circulatory disorder, without long-term current use of insulin    Sensorineural hearing loss (SNHL), bilateral    Lung nodule    History of CVA (cerebrovascular accident)    Status post placement of implantable loop recorder   [3]    Patient Active Problem List  Diagnosis    Hyperlipidemia associated with type 2 diabetes mellitus    Nontoxic multinodular goiter    Aortic stenosis    S/P AVR (aortic valve replacement)    Hypertension associated with diabetes    Left ventricular diastolic dysfunction with preserved systolic function    Aortic atherosclerosis    Osteopenia    Gastroesophageal reflux disease without esophagitis    Bilateral carotid artery stenosis    CKD stage 3 secondary to diabetes    Type 2 diabetes mellitus with microalbuminuria, without long-term current use of insulin    Obesity, diabetes, and hypertension syndrome    Overweight (BMI 25.0-29.9)    DM type 2 without retinopathy    Type 2 diabetes mellitus with circulatory disorder, without long-term current use of insulin    Sensorineural hearing loss (SNHL), bilateral    Lung nodule    History of CVA (cerebrovascular accident)    Status post placement of implantable loop recorder

## 2025-06-11 ENCOUNTER — CLINICAL SUPPORT (OUTPATIENT)
Dept: CARDIOLOGY | Facility: HOSPITAL | Age: 85
End: 2025-06-11
Attending: INTERNAL MEDICINE
Payer: MEDICARE

## 2025-06-11 ENCOUNTER — HOSPITAL ENCOUNTER (OUTPATIENT)
Dept: CARDIOLOGY | Facility: HOSPITAL | Age: 85
Discharge: HOME | End: 2025-06-11
Attending: INTERNAL MEDICINE
Payer: MEDICARE

## 2025-06-11 ENCOUNTER — CLINICAL SUPPORT (OUTPATIENT)
Dept: CARDIOLOGY | Facility: HOSPITAL | Age: 85
End: 2025-06-11
Payer: MEDICARE

## 2025-06-11 DIAGNOSIS — I63.9 CEREBRAL INFARCTION, UNSPECIFIED: ICD-10-CM

## 2025-06-11 DIAGNOSIS — Z95.2 S/P AVR (AORTIC VALVE REPLACEMENT): ICD-10-CM

## 2025-06-11 LAB
ASCENDING AORTA: 3.1 CM
AV INDEX (PROSTH): 0.38
AV MEAN GRADIENT: 32 MMHG
AV PEAK GRADIENT: 52 MMHG
AV REGURGITATION PRESSURE HALF TIME: 283 MS
AV VALVE AREA BY VELOCITY RATIO: 1.1 CM²
AV VALVE AREA: 1.2 CM²
AV VELOCITY RATIO: 0.36
CV ECHO LV RWT: 0.76 CM
DOP CALC AO PEAK VEL: 3.6 M/S
DOP CALC AO VTI: 88.4 CM
DOP CALC LVOT AREA: 3.1 CM2
DOP CALC LVOT DIAMETER: 2 CM
DOP CALC LVOT PEAK VEL: 1.3 M/S
DOP CALC LVOT STROKE VOLUME: 105.2 CM3
DOP CALC MV VTI: 25.3 CM
DOP CALCLVOT PEAK VEL VTI: 33.5 CM
E WAVE DECELERATION TIME: 197 MSEC
E/A RATIO: 0.61
ECHO LV POSTERIOR WALL: 1.6 CM (ref 0.6–1.1)
FRACTIONAL SHORTENING: 28.6 % (ref 28–44)
INTERVENTRICULAR SEPTUM: 1.5 CM (ref 0.6–1.1)
IVC DIAMETER: 1.46 CM
LA MAJOR: 5.7 CM
LA MINOR: 5.1 CM
LA WIDTH: 4.2 CM
LEFT ATRIUM SIZE: 4 CM
LEFT ATRIUM VOLUME: 77 CM3
LEFT INTERNAL DIMENSION IN SYSTOLE: 3 CM (ref 2.1–4)
LEFT VENTRICLE DIASTOLIC VOLUME: 80 ML
LEFT VENTRICLE SYSTOLIC VOLUME: 35 ML
LEFT VENTRICULAR INTERNAL DIMENSION IN DIASTOLE: 4.2 CM (ref 3.5–6)
LEFT VENTRICULAR MASS: 262.6 G
LVED V (TEICH): 79.84 ML
LVES V (TEICH): 34.88 ML
LVOT MG: 4.55 MMHG
LVOT MV: 1.03 CM/S
MV MEAN GRADIENT: 2 MMHG
MV PEAK A VEL: 1.08 M/S
MV PEAK E VEL: 0.66 M/S
MV PEAK GRADIENT: 6 MMHG
MV STENOSIS PRESSURE HALF TIME: 57.19 MS
MV VALVE AREA BY CONTINUITY EQUATION: 4.16 CM2
MV VALVE AREA P 1/2 METHOD: 3.85 CM2
OHS CV RV/LV RATIO: 0.86 CM
PISA AR MAX VEL: 3.57 M/S
PISA TR MAX VEL: 2.5 M/S
PV PEAK GRADIENT: 6 MMHG
PV PEAK VELOCITY: 1.23 M/S
RA MAJOR: 5.27 CM
RA PRESSURE ESTIMATED: 3 MMHG
RA WIDTH: 3.8 CM
RIGHT VENTRICLE DIASTOLIC BASEL DIMENSION: 3.6 CM
RIGHT VENTRICULAR END-DIASTOLIC DIMENSION: 3.55 CM
RV TB RVSP: 6 MMHG
TR MAX PG: 25 MMHG
TRICUSPID ANNULAR PLANE SYSTOLIC EXCURSION: 1.7 CM
TV REST PULMONARY ARTERY PRESSURE: 28 MMHG

## 2025-06-11 PROCEDURE — 93306 TTE W/DOPPLER COMPLETE: CPT | Mod: 26,HCNC,, | Performed by: INTERNAL MEDICINE

## 2025-06-11 PROCEDURE — 93306 TTE W/DOPPLER COMPLETE: CPT | Mod: HCNC

## 2025-06-11 PROCEDURE — 93298 REM INTERROG DEV EVAL SCRMS: CPT | Mod: HCNC | Performed by: INTERNAL MEDICINE

## 2025-06-18 ENCOUNTER — OFFICE VISIT (OUTPATIENT)
Dept: CARDIOLOGY | Facility: CLINIC | Age: 85
End: 2025-06-18
Payer: MEDICARE

## 2025-06-18 VITALS
BODY MASS INDEX: 26.69 KG/M2 | RESPIRATION RATE: 15 BRPM | HEIGHT: 67 IN | HEART RATE: 68 BPM | DIASTOLIC BLOOD PRESSURE: 67 MMHG | OXYGEN SATURATION: 98 % | SYSTOLIC BLOOD PRESSURE: 154 MMHG | WEIGHT: 170.06 LBS

## 2025-06-18 DIAGNOSIS — E78.5 HYPERLIPIDEMIA ASSOCIATED WITH TYPE 2 DIABETES MELLITUS: ICD-10-CM

## 2025-06-18 DIAGNOSIS — I70.0 AORTIC ATHEROSCLEROSIS: ICD-10-CM

## 2025-06-18 DIAGNOSIS — I35.0 NONRHEUMATIC AORTIC VALVE STENOSIS: ICD-10-CM

## 2025-06-18 DIAGNOSIS — Z95.818 STATUS POST PLACEMENT OF IMPLANTABLE LOOP RECORDER: ICD-10-CM

## 2025-06-18 DIAGNOSIS — R60.9 EDEMA, UNSPECIFIED TYPE: Primary | ICD-10-CM

## 2025-06-18 DIAGNOSIS — E11.69 HYPERLIPIDEMIA ASSOCIATED WITH TYPE 2 DIABETES MELLITUS: ICD-10-CM

## 2025-06-18 DIAGNOSIS — Z95.2 S/P AVR (AORTIC VALVE REPLACEMENT): ICD-10-CM

## 2025-06-18 LAB
OHS CV AF BURDEN PERCENT: < 1
OHS CV DC REMOTE DEVICE TYPE: NORMAL

## 2025-06-18 PROCEDURE — 1159F MED LIST DOCD IN RCRD: CPT | Mod: CPTII,HCNC,S$GLB, | Performed by: INTERNAL MEDICINE

## 2025-06-18 PROCEDURE — 1126F AMNT PAIN NOTED NONE PRSNT: CPT | Mod: CPTII,HCNC,S$GLB, | Performed by: INTERNAL MEDICINE

## 2025-06-18 PROCEDURE — 99214 OFFICE O/P EST MOD 30 MIN: CPT | Mod: HCNC,S$GLB,, | Performed by: INTERNAL MEDICINE

## 2025-06-18 PROCEDURE — 3077F SYST BP >= 140 MM HG: CPT | Mod: CPTII,HCNC,S$GLB, | Performed by: INTERNAL MEDICINE

## 2025-06-18 PROCEDURE — 3288F FALL RISK ASSESSMENT DOCD: CPT | Mod: CPTII,HCNC,S$GLB, | Performed by: INTERNAL MEDICINE

## 2025-06-18 PROCEDURE — 3078F DIAST BP <80 MM HG: CPT | Mod: CPTII,HCNC,S$GLB, | Performed by: INTERNAL MEDICINE

## 2025-06-18 PROCEDURE — G2211 COMPLEX E/M VISIT ADD ON: HCPCS | Mod: HCNC,S$GLB,, | Performed by: INTERNAL MEDICINE

## 2025-06-18 PROCEDURE — 1101F PT FALLS ASSESS-DOCD LE1/YR: CPT | Mod: CPTII,HCNC,S$GLB, | Performed by: INTERNAL MEDICINE

## 2025-06-18 PROCEDURE — 99999 PR PBB SHADOW E&M-EST. PATIENT-LVL IV: CPT | Mod: PBBFAC,HCNC,, | Performed by: INTERNAL MEDICINE

## 2025-06-18 RX ORDER — SPIRONOLACTONE 25 MG/1
25 TABLET ORAL DAILY
Qty: 90 TABLET | Refills: 3 | Status: SHIPPED | OUTPATIENT
Start: 2025-06-18

## 2025-06-18 RX ORDER — AMLODIPINE BESYLATE 10 MG/1
10 TABLET ORAL DAILY
Qty: 90 TABLET | Refills: 3 | Status: SHIPPED | OUTPATIENT
Start: 2025-06-18

## 2025-06-18 NOTE — PROGRESS NOTES
CARDIOVASCULAR CONSULTATION    REASON FOR CONSULT:   Zaida Liu is a 85 y.o. female who presents for   Chief Complaint   Patient presents with    Results        Referred by:  No referring provider defined for this encounter.      HISTORY OF PRESENT ILLNESS:     History of Present Illness    CHIEF COMPLAINT:  Zaida presents today with concerns about BP.    BLOOD PRESSURE AND DIZZINESS:  She reports recent BP with systolic of 183 and diastolic of 42. She experiences orthostatic dizziness, particularly when rising after watching television, requiring her to stand up slowly to manage symptoms. She was previously taken off amlodipine due to experiencing dizziness.    CARDIOVASCULAR:  She has a history of AVR. Last echo was in 2022.     CURRENT MEDICATIONS:  She is currently taking HCTZ, rosuvastatin, fenofibrate, and ezetimibe.         Results for orders placed or performed in visit on 05/14/25   EKG 12-lead    Collection Time: 05/14/25  3:04 PM   Result Value Ref Range    QRS Duration 86 ms    OHS QTC Calculation 428 ms    Narrative    Test Reason :    Vent. Rate :  69 BPM     Atrial Rate :  69 BPM     P-R Int : 202 ms          QRS Dur :  86 ms      QT Int : 400 ms       P-R-T Axes :  71  70  94 degrees    QTcB Int : 428 ms    Normal sinus rhythm  Nonspecific T wave abnormality  Abnormal ECG  When compared with ECG of 03-Nov-2023 09:44,  Nonspecific T wave abnormality has replaced inverted T waves in Lateral  leads  Confirmed by Parag Gomes ([id number]) on 5/15/2025 2:28:39 PM    Referred By: JANNET BENITEZ           Confirmed By: Parag Gomes          ECHO    Results for orders placed during the hospital encounter of 12/29/22    Echo    Interpretation Summary  · Mild left atrial enlargement.  · The left ventricle is normal in size with normal systolic function.  · The estimated ejection fraction is 65%.  · Grade I left ventricular diastolic dysfunction.  · Normal right ventricular size with normal right  ventricular systolic function.  · There is a 21 mm St. Jameel pericardial bioprosthetic aortic valve present. There is no aortic insufficiency present. Prosthetic aortic valve is normal.  · The aortic valve mean gradient is 10 mmHg with a dimensionless index of 0.54.  · Normal central venous pressure (3 mmHg).  · The estimated PA systolic pressure is 29 mmHg.  · There is no pulmonary hypertension.    Notes from June 25:    History of Present Illness    CHIEF COMPLAINT:  Zaida presents today for follow up of BP and edema    BLOOD PRESSURE AND SYMPTOMS:  She reports consistently elevated BP readings, ranging from 138-142, with a recent measurement of 154. She monitors BP daily in the morning before coffee or breakfast. She experiences dizziness upon standing, which persisted after discontinuing HCTZ. She denies continuous dizziness throughout the day.    CARDIOVASCULAR HISTORY:  She has a history of strokes of unclear etiology. She has had an implanted electronic monitoring device for 1 year to detect AF, with no AF episodes recorded to date. She underwent valve replacement 10 years ago. Current valve shows moderate stenosis, not yet severe enough to require intervention. The valve replacement is functioning within its expected 10-15 year lifespan.    CURRENT MEDICATIONS:  She currently takes Amlodipine 10 mg (recently increased from 5 mg due to systolic BP remaining above 140), aspirin, Carvedilol, Zetia, and Lasix. She was recently switched from HCTZ to Amlodipine. She denies significant side effects from medication changes, and reports stable edema with Amlodipine.     Results for orders placed during the hospital encounter of 06/11/25    Echo    Interpretation Summary    Left Ventricle: The left ventricle is normal in size. There is moderate concentric hypertrophy. There is normal systolic function with a visually estimated ejection fraction of 65 - 70%. Grade I diastolic dysfunction.    Right Ventricle: The right  ventricle is normal in size Systolic function is normal.    Left Atrium: The left atrium is moderately dilated    Aortic Valve: There is a bovine prosthetic valve in the aortic position. It is reported to be a 21 mm St. Jameel valve. Aortic valve area by VTI is 1.2 cm². Aortic valve peak velocity is 3.6 m/s. Mean gradient is 32 mmHg. The dimensionless index is 0.38. There is mild aortic regurgitation.    Mitral Valve: There is mild regurgitation.    Pulmonary Artery: The estimated pulmonary artery systolic pressure is 28 mmHg.    IVC/SVC: Normal venous pressure at 3 mmHg.        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 Jameel pericardial valve tissue via upper dominguez-sternotomy.    BREAST SURGERY      breast reduction    CHOLECYSTECTOMY      COLONOSCOPY N/A 9/21/2020    Procedure: COLONOSCOPY;  Surgeon: Maki Arango MD;  Location: [facility] ENDO;  Service: Endoscopy;  Laterality: N/A;    INSERTION OF IMPLANTABLE LOOP RECORDER Left 7/31/2023    Procedure: Insertion, Implantable Loop Recorder;  Surgeon: Harsh Araujo MD;  Location: [facility] EP LAB;  Service: Cardiology;  Laterality: Left;  CVA, ILR, BSCI, Local, SC, 3 Prep    TONSILLECTOMY      TOTAL REDUCTION MAMMOPLASTY      TUBAL LIGATION             SOCIAL  HISTORY:   Social History[1]    FAMILY HISTORY:     Family History   Problem Relation Name Age of Onset    Heart disease Mother          CHF    Heart failure Mother          d. 85    Hypertension Mother      Heart disease Father          d. 61    Alcohol abuse Father      Stroke Father      Hypertension Father      Lymphoma Sister Saniya         on immunosuppresant    Rheum arthritis Sister Saniya         d.80    Cancer Sister Saniya         lymphoma    Heart disease Sister Afshan     Stroke Sister Afshan         mild    Other Sister Afshan         frequent urination    Colon cancer Sister Le         d. 81    Heart disease Brother Kash         d. 82    Heart disease Brother Rodrick         d. 60    Diabetes Brother Rodrick     No Known Problems Daughter Echo         lives in [facility], 2nd m pending    No Known Problems Son Alphonso         lives in [place], 15 yo son, 22 yo dtr- 2019    Hypertension Son Deandre         lives in [facility], 18yo son (2023)    Breast cancer Cousin      Cancer Cousin          breast    Breast cancer Other neice     Cancer Other neice         breast    Colon cancer Other neice     Heart attack Neg Hx      Hyperlipidemia Neg Hx         REVIEW OF SYSTEMS:   Review of Systems   Constitutional: Negative.   HENT: Negative.     Eyes: Negative.    Cardiovascular: Negative.    Respiratory: Negative.     Endocrine: Negative.    Hematologic/Lymphatic: Negative.    Skin: Negative.    Musculoskeletal: Negative.    Gastrointestinal: Negative.    Genitourinary: Negative.    Neurological: Negative.    Psychiatric/Behavioral: Negative.     Allergic/Immunologic: Negative.        A 10 point review of systems was performed and all the pertinent positives have been mentioned. Rest of review of systems was negative.        PHYSICAL EXAM:     Vitals:    06/18/25 1503   BP: (!) 154/67   Pulse: 68   Resp: 15    Body mass index is 26.64 kg/m².  Weight: 77.2 kg (170 lb 1.4 oz)  "  Height: 5' 7" (170.2 cm)     Physical Exam  Constitutional:       Appearance: Normal appearance. She is well-developed.   HENT:      Head: Normocephalic.   Eyes:      Pupils: Pupils are equal, round, and reactive to light.   Cardiovascular:      Rate and Rhythm: Normal rate and regular rhythm.      Heart sounds: Murmur heard.   Pulmonary:      Effort: Pulmonary effort is normal.      Breath sounds: Normal breath sounds.   Abdominal:      General: Bowel sounds are normal.      Palpations: Abdomen is soft.      Tenderness: There is no abdominal tenderness.   Musculoskeletal:         General: Normal range of motion.      Cervical back: Normal range of motion and neck supple.   Skin:     General: Skin is warm.   Neurological:      Mental Status: She is alert and oriented to person, place, and time.         Physical Exam              DATA:     Laboratory:  CBC:  Recent Labs   Lab 03/01/23  0949 08/15/24  1216 10/08/24  1104   WBC 7.58 7.86 7.82   Hemoglobin 11.2 L 11.7 L 11.4 L   Hematocrit 34.7 L 34.7 L 34.8 L   Platelets 246 227 222       CHEMISTRIES:  Recent Labs   Lab 08/15/24  1216 10/08/24  1104 04/29/25  1447   Glucose 164 H 151 H 109   Sodium 139 140 140   Potassium 4.2 4.6 4.6   BUN 30 H 34 H 43 H   Creatinine 1.3 1.5 H 1.6 H   Calcium 9.9 9.5 9.6       CARDIAC BIOMARKERS:  Recent Labs   Lab 05/19/23  1535    H       COAGS:  Recent Labs   Lab 05/19/23  1606 05/20/23  0331   INR 1.1 1.1       LIPIDS/LFTS:  Recent Labs   Lab 06/12/23  1032 09/06/23  1019 04/03/24  1105 08/15/24  1216 10/08/24  1104 04/29/25  1447   Cholesterol 160 147  --   --  138  --    Triglycerides 156 H 106  --   --  118  --    HDL 51 45  --   --  43  --    LDL Cholesterol 77.8 80.8  --   --  71.4  --    Non-HDL Cholesterol 109 102  --   --  95  --    AST 21 18   < > 18 24 19   ALT 18 16   < > 15 20 18    < > = values in this interval not displayed.       Hemoglobin A1C   Date Value Ref Range Status   10/08/2024 7.3 (H) 4.0 - 5.6 % " Final     Comment:     ADA Screening Guidelines:  5.7-6.4%  Consistent with prediabetes  >or=6.5%  Consistent with diabetes    High levels of fetal hemoglobin interfere with the HbA1C  assay. Heterozygous hemoglobin variants (HbS, HgC, etc)do  not significantly interfere with this assay.   However, presence of multiple variants may affect accuracy.     08/15/2024 7.3 (H) 4.0 - 5.6 % Final     Comment:     ADA Screening Guidelines:  5.7-6.4%  Consistent with prediabetes  >or=6.5%  Consistent with diabetes    High levels of fetal hemoglobin interfere with the HbA1C  assay. Heterozygous hemoglobin variants (HbS, HgC, etc)do  not significantly interfere with this assay.   However, presence of multiple variants may affect accuracy.     04/03/2024 7.7 (H) 4.0 - 5.6 % Final     Comment:     ADA Screening Guidelines:  5.7-6.4%  Consistent with prediabetes  >or=6.5%  Consistent with diabetes    High levels of fetal hemoglobin interfere with the HbA1C  assay. Heterozygous hemoglobin variants (HbS, HgC, etc)do  not significantly interfere with this assay.   However, presence of multiple variants may affect accuracy.       Hemoglobin A1c   Date Value Ref Range Status   04/29/2025 7.1 (H) 4.0 - 5.6 % Final     Comment:     ADA Screening Guidelines:  5.7-6.4%  Consistent with prediabetes  >=6.5%  Consistent with diabetes    High levels of fetal hemoglobin interfere with the HbA1C  assay. Heterozygous hemoglobin variants (HbS, HgC, etc)do  not significantly interfere with this assay.   However, presence of multiple variants may affect accuracy.       TSH  Recent Labs   Lab 06/12/23  1032 08/15/24  1216 10/08/24  1104   TSH 1.462 2.318 3.559       The ASCVD Risk score (Jack SILVA, et al., 2019) failed to calculate for the following reasons:    The 2019 ASCVD risk score is only valid for ages 40 to 79    Risk score cannot be calculated because patient has a medical history suggesting prior/existing ASCVD       BNP    Lab Results    Component Value Date/Time    BNP 95 04/29/2025 02:47 PM    BNP 28 12/15/2015 05:01 AM    BNP 64 11/11/2015 03:25 PM         ASSESSMENT AND PLAN     Problem List[2]        ALLERGIES AND MEDICATION:     Review of patient's allergies indicates:   Allergen Reactions    Codeine Other (See Comments)        Medication List            Accurate as of June 18, 2025  3:37 PM. If you have any questions, ask your nurse or doctor.                START taking these medications      spironolactone 25 MG tablet  Commonly known as: ALDACTONE  Take 1 tablet (25 mg total) by mouth once daily.  Started by: Rashida Kumar MD            CHANGE how you take these medications      amLODIPine 10 MG tablet  Commonly known as: NORVASC  Take 1 tablet (10 mg total) by mouth once daily.  What changed:   medication strength  how much to take  Changed by: Rashida Kumar MD            CONTINUE taking these medications      aspirin 81 MG EC tablet  Commonly known as: ECOTRIN     carvediloL 6.25 MG tablet  Commonly known as: COREG  Take 1 tablet (6.25 mg total) by mouth 2 (two) times daily with meals.     diclofenac sodium 1 % Gel  Commonly known as: VOLTAREN     ezetimibe 10 mg tablet  Commonly known as: ZETIA  TAKE 1 TABLET ONE TIME DAILY     furosemide 20 MG tablet  Commonly known as: LASIX  Take 1 tablet (20 mg total) by mouth as needed (Take 1 pill as needed for edema).     INVOKANA 100 mg Tab tablet  Generic drug: canagliflozin  TAKE 1 TABLET ONE TIME DAILY     losartan 100 MG tablet  Commonly known as: COZAAR  TAKE 1 TABLET EVERY DAY     metFORMIN 500 MG tablet  Commonly known as: GLUCOPHAGE  TAKE 1 TABLET TWICE DAILY WITH MEALS     omeprazole 20 MG capsule  Commonly known as: PRILOSEC     rosuvastatin 40 MG Tab  Commonly known as: CRESTOR  TAKE 1 TABLET EVERY EVENING     VITAMIN B COMPLEX-C ORAL     vitamin D 1000 units Tab  Commonly known as: VITAMIN D3               Where to Get Your Medications        These medications were sent to [facility]  [facility] - [place]AMIA BENITEZ - [address]  [address] [address]      Phone: [phone]   amLODIPine 10 MG tablet  spironolactone 25 MG tablet         Orders Placed This Encounter   Procedures    COMPRESSION STOCKINGS    Basic Metabolic Panel       Assessment & Plan      Assessment & Plan    IMPRESSION:  Blood pressure control assessed, noting home readings around 140 mmHg systolic.  Adjusted antihypertensive regimen to address both blood pressure and edema.  Increased Amlodipine to 10 mg daily, noting edema has not worsened with this medication.  Started spironolactone daily for blood pressure and edema control  Continued Lasix as needed for severe edema, to be used judiciously.    PLAN SUMMARY:  - Increase Amlodipine to 10 mg daily  - Start spironolactone daily for blood pressure and edema control  - Order BMP in 1 week after starting spironolactone  - Use compression stockings   - Continue Lasix as needed for severe edema  - Follow up in 3 months    AORTIC VALVE STENOSIS AND PROSTHETIC HEART VALVE:  -moderate aortic stenosis.  - Provided information on the future possibility of TAVR procedure for valve replacement if needed.    HYPERTENSION:  - Increased Amlodipine to 10 mg daily, noting edema has not worsened with this medication.  - Started spironolactone daily for blood pressure and edema control, opted over Lasix due to concerns about hydration status and potential for overuse.    EDEMA:  - Anise to use compression stockings for edema management.  - Continued Lasix as needed for severe edema, to be used judiciously.    HISTORY OF TIA AND CEREBRAL INFARCTION:  - Discussed the multifactorial nature of strokes, including the role of cholesterol buildup in arteries.    FOLLOW-UP AND MONITORING:  - Ordered BMP in 1 week after starting spironolactone.  - Follow up in 3 months.  - Contact the office if any concerning symptoms arise or if lab results require immediate attention.        History of CVA.  Has a implantable loop recorder.  Followed by EP    FOLLOW-UP:  - Follow up after 3 m          Thank you very much for involving me in the care of your patient.  Please do not hesitate to contact me if there are any questions.      Rashida Kumar MD, FAC, [facility]  Interventional Cardiologist, [facility].       Visit today included increased complexity associated with the care of the episodic problem aortic valve replacement, dyslipidemia, stroke, hypertension, aortic valve replaced addressed and managing the longitudinal care of the patient due to the serious and/or complex managed problem(s) Problem List[3]  .    This note was dictated with the help of speech recognition software.  There might be un-intended errors and/or substitutions.    This note was generated with the assistance of ambient listening technology. Verbal consent was obtained by the patient and accompanying visitor(s) for the recording of patient appointment to facilitate this note. I attest to having reviewed and edited the generated note for accuracy, though some syntax or spelling errors may persist. Please contact the author of this note for any clarification.                        [1]   Social History  Socioeconomic History    Marital status:    Tobacco Use    Smoking status: Former     Current packs/day: 0.00     Types: Cigarettes     Quit date: 10/14/1966     Years since quittin.7    Smokeless tobacco: Never   Substance and Sexual Activity    Alcohol use: Yes     Alcohol/week: 2.0 standard drinks of alcohol     Types: 2 Glasses of wine per week     Comment: 1-2 glasses on wine on weekend    Drug use: No    Sexual activity: Not Currently     Partners: Male   Social History Narrative          Spouse retired, first spouse  when he was 38.          3 children,  2 sons, and daughter,      son with hypertension.  No heart disease as of yet.      Her spouse has hypertension, AFib and memory      issues, but  apparently since he has retired they are doing better.           FHX:    Bro 82, d. massive MI, + DM,s/p amputation LE limb    Sis , new dx cerebrovascular ds, s/p stent x2 in cerebral carotid               Social Drivers of Health     Financial Resource Strain: Low Risk  (8/19/2024)    Overall Financial Resource Strain (CARDIA)     Difficulty of Paying Living Expenses: Not hard at all   Food Insecurity: No Food Insecurity (8/19/2024)    Hunger Vital Sign     Worried About Running Out of Food in the Last Year: Never true     Ran Out of Food in the Last Year: Never true   Transportation Needs: No Transportation Needs (8/5/2021)    PRAPARE - Transportation     Lack of Transportation (Medical): No     Lack of Transportation (Non-Medical): No   Physical Activity: Unknown (8/19/2024)    Exercise Vital Sign     Days of Exercise per Week: 0 days   Stress: No Stress Concern Present (8/19/2024)    [employer] - Occupational Stress Questionnaire     Feeling of Stress : Not at all   Housing Stability: Low Risk  (8/5/2021)    Housing Stability Vital Sign     Unable to Pay for Housing in the Last Year: No     Number of Places Lived in the Last Year: 1     Unstable Housing in the Last Year: No   [2]   Patient Active Problem List  Diagnosis    Hyperlipidemia associated with type 2 diabetes mellitus    Nontoxic multinodular goiter    Aortic stenosis    S/P AVR (aortic valve replacement)    Hypertension associated with diabetes    Left ventricular diastolic dysfunction with preserved systolic function    Aortic atherosclerosis    Osteopenia    Gastroesophageal reflux disease without esophagitis    Bilateral carotid artery stenosis    CKD stage 3 secondary to diabetes    Type 2 diabetes mellitus with microalbuminuria, without long-term current use of insulin    Obesity, diabetes, and hypertension syndrome    Overweight (BMI 25.0-29.9)    DM type 2 without retinopathy    Type 2 diabetes mellitus with  circulatory disorder, without long-term current use of insulin    Sensorineural hearing loss (SNHL), bilateral    Lung nodule    History of CVA (cerebrovascular accident)    Status post placement of implantable loop recorder   [3]   Patient Active Problem List  Diagnosis    Hyperlipidemia associated with type 2 diabetes mellitus    Nontoxic multinodular goiter    Aortic stenosis    S/P AVR (aortic valve replacement)    Hypertension associated with diabetes    Left ventricular diastolic dysfunction with preserved systolic function    Aortic atherosclerosis    Osteopenia    Gastroesophageal reflux disease without esophagitis    Bilateral carotid artery stenosis    CKD stage 3 secondary to diabetes    Type 2 diabetes mellitus with microalbuminuria, without long-term current use of insulin    Obesity, diabetes, and hypertension syndrome    Overweight (BMI 25.0-29.9)    DM type 2 without retinopathy    Type 2 diabetes mellitus with circulatory disorder, without long-term current use of insulin    Sensorineural hearing loss (SNHL), bilateral    Lung nodule    History of CVA (cerebrovascular accident)    Status post placement of implantable loop recorder

## 2025-06-24 ENCOUNTER — LAB VISIT (OUTPATIENT)
Dept: LAB | Facility: HOSPITAL | Age: 85
End: 2025-06-24
Attending: INTERNAL MEDICINE
Payer: MEDICARE

## 2025-06-24 DIAGNOSIS — R60.9 EDEMA, UNSPECIFIED TYPE: ICD-10-CM

## 2025-06-24 LAB
ANION GAP (OHS): 10 MMOL/L (ref 8–16)
BUN SERPL-MCNC: 27 MG/DL (ref 8–23)
CALCIUM SERPL-MCNC: 9.1 MG/DL (ref 8.7–10.5)
CHLORIDE SERPL-SCNC: 107 MMOL/L (ref 95–110)
CO2 SERPL-SCNC: 23 MMOL/L (ref 23–29)
CREAT SERPL-MCNC: 1.3 MG/DL (ref 0.5–1.4)
GFR SERPLBLD CREATININE-BSD FMLA CKD-EPI: 41 ML/MIN/1.73/M2
GLUCOSE SERPL-MCNC: 181 MG/DL (ref 70–110)
POTASSIUM SERPL-SCNC: 4.2 MMOL/L (ref 3.5–5.1)
SODIUM SERPL-SCNC: 140 MMOL/L (ref 136–145)

## 2025-06-24 PROCEDURE — 36415 COLL VENOUS BLD VENIPUNCTURE: CPT | Mod: HCNC,PO

## 2025-06-24 PROCEDURE — 82374 ASSAY BLOOD CARBON DIOXIDE: CPT | Mod: HCNC

## 2025-07-16 ENCOUNTER — CLINICAL SUPPORT (OUTPATIENT)
Dept: CARDIOLOGY | Facility: HOSPITAL | Age: 85
End: 2025-07-16
Attending: INTERNAL MEDICINE
Payer: MEDICARE

## 2025-07-16 ENCOUNTER — CLINICAL SUPPORT (OUTPATIENT)
Dept: CARDIOLOGY | Facility: HOSPITAL | Age: 85
End: 2025-07-16
Payer: MEDICARE

## 2025-07-16 DIAGNOSIS — I63.9 CEREBRAL INFARCTION, UNSPECIFIED: ICD-10-CM

## 2025-07-16 PROCEDURE — 93298 REM INTERROG DEV EVAL SCRMS: CPT | Mod: HCNC | Performed by: INTERNAL MEDICINE

## 2025-07-22 ENCOUNTER — TELEPHONE (OUTPATIENT)
Dept: INTERNAL MEDICINE | Facility: CLINIC | Age: 85
End: 2025-07-22
Payer: MEDICARE

## 2025-07-22 DIAGNOSIS — I10 HYPERTENSION, UNSPECIFIED TYPE: ICD-10-CM

## 2025-07-22 DIAGNOSIS — E11.65 TYPE 2 DIABETES MELLITUS WITH HYPERGLYCEMIA, UNSPECIFIED WHETHER LONG TERM INSULIN USE: Primary | ICD-10-CM

## 2025-07-22 DIAGNOSIS — E78.5 HYPERLIPIDEMIA ASSOCIATED WITH TYPE 2 DIABETES MELLITUS: ICD-10-CM

## 2025-07-22 DIAGNOSIS — E11.69 HYPERLIPIDEMIA ASSOCIATED WITH TYPE 2 DIABETES MELLITUS: ICD-10-CM

## 2025-07-28 LAB
OHS CV AF BURDEN PERCENT: < 1
OHS CV DC REMOTE DEVICE TYPE: NORMAL

## 2025-07-30 DIAGNOSIS — I15.2 HYPERTENSION ASSOCIATED WITH DIABETES: ICD-10-CM

## 2025-07-30 DIAGNOSIS — E11.59 HYPERTENSION ASSOCIATED WITH DIABETES: ICD-10-CM

## 2025-07-30 RX ORDER — LOSARTAN POTASSIUM 100 MG/1
100 TABLET ORAL
Qty: 90 TABLET | Refills: 3 | Status: SHIPPED | OUTPATIENT
Start: 2025-07-30

## 2025-08-18 RX ORDER — METFORMIN HYDROCHLORIDE 500 MG/1
500 TABLET ORAL 2 TIMES DAILY WITH MEALS
Qty: 180 TABLET | Refills: 3 | Status: SHIPPED | OUTPATIENT
Start: 2025-08-18

## 2025-08-19 RX ORDER — FUROSEMIDE 20 MG/1
20 TABLET ORAL DAILY
Qty: 90 TABLET | Refills: 3 | Status: SHIPPED | OUTPATIENT
Start: 2025-08-19

## 2025-08-19 RX ORDER — SPIRONOLACTONE 25 MG/1
25 TABLET ORAL DAILY
Qty: 90 TABLET | Refills: 3 | Status: SHIPPED | OUTPATIENT
Start: 2025-08-19

## 2025-08-20 ENCOUNTER — CLINICAL SUPPORT (OUTPATIENT)
Dept: CARDIOLOGY | Facility: HOSPITAL | Age: 85
End: 2025-08-20
Attending: INTERNAL MEDICINE
Payer: MEDICARE

## 2025-08-20 ENCOUNTER — CLINICAL SUPPORT (OUTPATIENT)
Dept: CARDIOLOGY | Facility: HOSPITAL | Age: 85
End: 2025-08-20
Payer: MEDICARE

## 2025-08-20 DIAGNOSIS — I63.9 CEREBRAL INFARCTION, UNSPECIFIED: ICD-10-CM

## 2025-08-20 PROCEDURE — 93298 REM INTERROG DEV EVAL SCRMS: CPT | Performed by: INTERNAL MEDICINE

## 2025-08-28 LAB
OHS CV AF BURDEN PERCENT: < 1
OHS CV DC REMOTE DEVICE TYPE: NORMAL

## 2025-09-02 ENCOUNTER — PATIENT MESSAGE (OUTPATIENT)
Dept: INTERNAL MEDICINE | Facility: CLINIC | Age: 85
End: 2025-09-02
Payer: MEDICARE

## 2025-09-02 ENCOUNTER — TELEPHONE (OUTPATIENT)
Dept: INTERNAL MEDICINE | Facility: CLINIC | Age: 85
End: 2025-09-02
Payer: MEDICARE

## 2025-09-03 ENCOUNTER — TELEPHONE (OUTPATIENT)
Dept: INTERNAL MEDICINE | Facility: CLINIC | Age: 85
End: 2025-09-03
Payer: MEDICARE

## 2025-09-03 RX ORDER — HYDROCHLOROTHIAZIDE 25 MG/1
25 TABLET ORAL
Qty: 90 TABLET | Refills: 3 | OUTPATIENT
Start: 2025-09-03

## (undated) DEVICE — DRAPE OPTIMA MAJOR PEDIATRIC

## (undated) DEVICE — DRESSING MEPILEX FLEX 3X3IN

## (undated) DEVICE — ADHESIVE DERMABOND ADVANCED